# Patient Record
Sex: FEMALE | Race: BLACK OR AFRICAN AMERICAN | NOT HISPANIC OR LATINO | Employment: OTHER | URBAN - METROPOLITAN AREA
[De-identification: names, ages, dates, MRNs, and addresses within clinical notes are randomized per-mention and may not be internally consistent; named-entity substitution may affect disease eponyms.]

---

## 2017-03-30 ENCOUNTER — HOSPITAL ENCOUNTER (INPATIENT)
Facility: HOSPITAL | Age: 77
LOS: 2 days | Discharge: HOME HEALTH | DRG: 394 | End: 2017-04-01
Attending: EMERGENCY MEDICINE | Admitting: INTERNAL MEDICINE
Payer: COMMERCIAL

## 2017-03-30 DIAGNOSIS — D72.829 LEUKOCYTOSIS, UNSPECIFIED TYPE: ICD-10-CM

## 2017-03-30 DIAGNOSIS — R07.89 CHEST WALL PAIN: ICD-10-CM

## 2017-03-30 DIAGNOSIS — K92.1 HEMATOCHEZIA: ICD-10-CM

## 2017-03-30 DIAGNOSIS — K55.9 ISCHEMIC COLITIS: ICD-10-CM

## 2017-03-30 DIAGNOSIS — K92.2 GI BLEED: ICD-10-CM

## 2017-03-30 DIAGNOSIS — E44.0 MALNUTRITION OF MODERATE DEGREE: ICD-10-CM

## 2017-03-30 DIAGNOSIS — R42 DIZZINESS: ICD-10-CM

## 2017-03-30 DIAGNOSIS — R53.81 DEBILITY: ICD-10-CM

## 2017-03-30 DIAGNOSIS — I10 ESSENTIAL HYPERTENSION: Chronic | ICD-10-CM

## 2017-03-30 DIAGNOSIS — K52.9 COLITIS: ICD-10-CM

## 2017-03-30 DIAGNOSIS — K92.2 GASTROINTESTINAL HEMORRHAGE, UNSPECIFIED GASTROINTESTINAL HEMORRHAGE TYPE: Primary | ICD-10-CM

## 2017-03-30 LAB
ABO + RH BLD: NORMAL
ALBUMIN SERPL BCP-MCNC: 2.9 G/DL
ALP SERPL-CCNC: 54 U/L
ALT SERPL W/O P-5'-P-CCNC: 10 U/L
ANION GAP SERPL CALC-SCNC: 12 MMOL/L
APTT BLDCRRT: 27.2 SEC
AST SERPL-CCNC: 14 U/L
BASOPHILS # BLD AUTO: 0.03 K/UL
BASOPHILS # BLD AUTO: 0.03 K/UL
BASOPHILS # BLD AUTO: 0.04 K/UL
BASOPHILS NFR BLD: 0.1 %
BASOPHILS NFR BLD: 0.2 %
BASOPHILS NFR BLD: 0.2 %
BILIRUB SERPL-MCNC: 0.3 MG/DL
BILIRUB UR QL STRIP: NEGATIVE
BLD GP AB SCN CELLS X3 SERPL QL: NORMAL
BLD PROD TYP BPU: NORMAL
BLD PROD TYP BPU: NORMAL
BLOOD UNIT EXPIRATION DATE: NORMAL
BLOOD UNIT EXPIRATION DATE: NORMAL
BLOOD UNIT TYPE CODE: 7300
BLOOD UNIT TYPE CODE: 7300
BLOOD UNIT TYPE: NORMAL
BLOOD UNIT TYPE: NORMAL
BUN SERPL-MCNC: 16 MG/DL
CALCIUM SERPL-MCNC: 9.4 MG/DL
CHLORIDE SERPL-SCNC: 98 MMOL/L
CLARITY UR: CLEAR
CO2 SERPL-SCNC: 23 MMOL/L
CODING SYSTEM: NORMAL
CODING SYSTEM: NORMAL
COLOR UR: YELLOW
CREAT SERPL-MCNC: 1.1 MG/DL
DIFFERENTIAL METHOD: ABNORMAL
DISPENSE STATUS: NORMAL
DISPENSE STATUS: NORMAL
EOSINOPHIL # BLD AUTO: 0.1 K/UL
EOSINOPHIL NFR BLD: 0.3 %
EOSINOPHIL NFR BLD: 0.6 %
EOSINOPHIL NFR BLD: 0.7 %
ERYTHROCYTE [DISTWIDTH] IN BLOOD BY AUTOMATED COUNT: 14.4 %
ERYTHROCYTE [DISTWIDTH] IN BLOOD BY AUTOMATED COUNT: 15.1 %
ERYTHROCYTE [DISTWIDTH] IN BLOOD BY AUTOMATED COUNT: 15.3 %
EST. GFR  (AFRICAN AMERICAN): 57 ML/MIN/1.73 M^2
EST. GFR  (NON AFRICAN AMERICAN): 49 ML/MIN/1.73 M^2
GLUCOSE SERPL-MCNC: 258 MG/DL
GLUCOSE UR QL STRIP: ABNORMAL
HCT VFR BLD AUTO: 25 %
HCT VFR BLD AUTO: 29 %
HCT VFR BLD AUTO: 29.1 %
HGB BLD-MCNC: 8.7 G/DL
HGB BLD-MCNC: 9.4 G/DL
HGB BLD-MCNC: 9.7 G/DL
HGB UR QL STRIP: NEGATIVE
INR PPP: 1
KETONES UR QL STRIP: NEGATIVE
LACTATE SERPL-SCNC: 2.2 MMOL/L
LEUKOCYTE ESTERASE UR QL STRIP: NEGATIVE
LYMPHOCYTES # BLD AUTO: 2.3 K/UL
LYMPHOCYTES # BLD AUTO: 2.6 K/UL
LYMPHOCYTES # BLD AUTO: 2.7 K/UL
LYMPHOCYTES NFR BLD: 10.7 %
LYMPHOCYTES NFR BLD: 13.6 %
LYMPHOCYTES NFR BLD: 13.7 %
MCH RBC QN AUTO: 27.2 PG
MCH RBC QN AUTO: 27.3 PG
MCH RBC QN AUTO: 28 PG
MCHC RBC AUTO-ENTMCNC: 32.4 %
MCHC RBC AUTO-ENTMCNC: 33.3 %
MCHC RBC AUTO-ENTMCNC: 34.8 %
MCV RBC AUTO: 80 FL
MCV RBC AUTO: 82 FL
MCV RBC AUTO: 84 FL
MONOCYTES # BLD AUTO: 1.2 K/UL
MONOCYTES # BLD AUTO: 1.3 K/UL
MONOCYTES # BLD AUTO: 1.5 K/UL
MONOCYTES NFR BLD: 5.9 %
MONOCYTES NFR BLD: 6.4 %
MONOCYTES NFR BLD: 7.4 %
NEUTROPHILS # BLD AUTO: 14.7 K/UL
NEUTROPHILS # BLD AUTO: 15.2 K/UL
NEUTROPHILS # BLD AUTO: 17.8 K/UL
NEUTROPHILS NFR BLD: 78.1 %
NEUTROPHILS NFR BLD: 78.2 %
NEUTROPHILS NFR BLD: 82.4 %
NITRITE UR QL STRIP: NEGATIVE
PH UR STRIP: 5 [PH] (ref 5–8)
PLATELET # BLD AUTO: 202 K/UL
PLATELET # BLD AUTO: 234 K/UL
PLATELET # BLD AUTO: 339 K/UL
PMV BLD AUTO: 8.6 FL
PMV BLD AUTO: 8.9 FL
PMV BLD AUTO: 9.2 FL
POCT GLUCOSE: 146 MG/DL (ref 70–110)
POCT GLUCOSE: 163 MG/DL (ref 70–110)
POCT GLUCOSE: 245 MG/DL (ref 70–110)
POCT GLUCOSE: 262 MG/DL (ref 70–110)
POTASSIUM SERPL-SCNC: 4.4 MMOL/L
PROT SERPL-MCNC: 7 G/DL
PROT UR QL STRIP: NEGATIVE
PROTHROMBIN TIME: 11 SEC
RBC # BLD AUTO: 3.11 M/UL
RBC # BLD AUTO: 3.44 M/UL
RBC # BLD AUTO: 3.56 M/UL
SODIUM SERPL-SCNC: 133 MMOL/L
SP GR UR STRIP: >1.03 (ref 1–1.03)
TRANS ERYTHROCYTES VOL PATIENT: NORMAL ML
TRANS ERYTHROCYTES VOL PATIENT: NORMAL ML
URN SPEC COLLECT METH UR: ABNORMAL
UROBILINOGEN UR STRIP-ACNC: NEGATIVE EU/DL
WBC # BLD AUTO: 18.8 K/UL
WBC # BLD AUTO: 19.51 K/UL
WBC # BLD AUTO: 21.63 K/UL

## 2017-03-30 PROCEDURE — 36430 TRANSFUSION BLD/BLD COMPNT: CPT

## 2017-03-30 PROCEDURE — 85025 COMPLETE CBC W/AUTO DIFF WBC: CPT | Mod: 91

## 2017-03-30 PROCEDURE — 83605 ASSAY OF LACTIC ACID: CPT

## 2017-03-30 PROCEDURE — P9021 RED BLOOD CELLS UNIT: HCPCS

## 2017-03-30 PROCEDURE — 25000003 PHARM REV CODE 250: Performed by: EMERGENCY MEDICINE

## 2017-03-30 PROCEDURE — 85610 PROTHROMBIN TIME: CPT

## 2017-03-30 PROCEDURE — 25500020 PHARM REV CODE 255: Performed by: EMERGENCY MEDICINE

## 2017-03-30 PROCEDURE — 86850 RBC ANTIBODY SCREEN: CPT

## 2017-03-30 PROCEDURE — C9113 INJ PANTOPRAZOLE SODIUM, VIA: HCPCS | Performed by: EMERGENCY MEDICINE

## 2017-03-30 PROCEDURE — 86900 BLOOD TYPING SEROLOGIC ABO: CPT

## 2017-03-30 PROCEDURE — 36415 COLL VENOUS BLD VENIPUNCTURE: CPT

## 2017-03-30 PROCEDURE — 81003 URINALYSIS AUTO W/O SCOPE: CPT

## 2017-03-30 PROCEDURE — 94761 N-INVAS EAR/PLS OXIMETRY MLT: CPT

## 2017-03-30 PROCEDURE — 63600175 PHARM REV CODE 636 W HCPCS: Performed by: EMERGENCY MEDICINE

## 2017-03-30 PROCEDURE — 51702 INSERT TEMP BLADDER CATH: CPT

## 2017-03-30 PROCEDURE — 85730 THROMBOPLASTIN TIME PARTIAL: CPT

## 2017-03-30 PROCEDURE — 99291 CRITICAL CARE FIRST HOUR: CPT | Mod: 25

## 2017-03-30 PROCEDURE — 63600175 PHARM REV CODE 636 W HCPCS: Performed by: INTERNAL MEDICINE

## 2017-03-30 PROCEDURE — 82962 GLUCOSE BLOOD TEST: CPT

## 2017-03-30 PROCEDURE — 96361 HYDRATE IV INFUSION ADD-ON: CPT

## 2017-03-30 PROCEDURE — 20000000 HC ICU ROOM

## 2017-03-30 PROCEDURE — 96375 TX/PRO/DX INJ NEW DRUG ADDON: CPT

## 2017-03-30 PROCEDURE — 86920 COMPATIBILITY TEST SPIN: CPT

## 2017-03-30 PROCEDURE — 96365 THER/PROPH/DIAG IV INF INIT: CPT

## 2017-03-30 PROCEDURE — 25000003 PHARM REV CODE 250: Performed by: INTERNAL MEDICINE

## 2017-03-30 PROCEDURE — 80053 COMPREHEN METABOLIC PANEL: CPT

## 2017-03-30 RX ORDER — HYDROCODONE BITARTRATE AND ACETAMINOPHEN 500; 5 MG/1; MG/1
TABLET ORAL
Status: DISCONTINUED | OUTPATIENT
Start: 2017-03-30 | End: 2017-04-01 | Stop reason: HOSPADM

## 2017-03-30 RX ORDER — ONDANSETRON 2 MG/ML
8 INJECTION INTRAMUSCULAR; INTRAVENOUS EVERY 8 HOURS PRN
Status: DISCONTINUED | OUTPATIENT
Start: 2017-03-30 | End: 2017-03-30 | Stop reason: SINTOL

## 2017-03-30 RX ORDER — VALSARTAN 320 MG/1
320 TABLET ORAL DAILY
Status: ON HOLD | COMMUNITY
End: 2017-04-01

## 2017-03-30 RX ORDER — RAMELTEON 8 MG/1
8 TABLET ORAL NIGHTLY PRN
Status: DISCONTINUED | OUTPATIENT
Start: 2017-03-30 | End: 2017-04-01 | Stop reason: HOSPADM

## 2017-03-30 RX ORDER — GLUCAGON 1 MG
1 KIT INJECTION
Status: DISCONTINUED | OUTPATIENT
Start: 2017-03-30 | End: 2017-04-01 | Stop reason: HOSPADM

## 2017-03-30 RX ORDER — POLYETHYLENE GLYCOL 3350, SODIUM SULFATE ANHYDROUS, SODIUM BICARBONATE, SODIUM CHLORIDE, POTASSIUM CHLORIDE 236; 22.74; 6.74; 5.86; 2.97 G/4L; G/4L; G/4L; G/4L; G/4L
2000 POWDER, FOR SOLUTION ORAL ONCE
Status: COMPLETED | OUTPATIENT
Start: 2017-03-31 | End: 2017-03-30

## 2017-03-30 RX ORDER — METRONIDAZOLE 500 MG/100ML
500 INJECTION, SOLUTION INTRAVENOUS
Status: DISCONTINUED | OUTPATIENT
Start: 2017-03-30 | End: 2017-03-31

## 2017-03-30 RX ORDER — HYDROCHLOROTHIAZIDE 25 MG/1
25 TABLET ORAL DAILY
Status: ON HOLD | COMMUNITY
End: 2017-04-01 | Stop reason: HOSPADM

## 2017-03-30 RX ORDER — PANTOPRAZOLE SODIUM 40 MG/10ML
80 INJECTION, POWDER, LYOPHILIZED, FOR SOLUTION INTRAVENOUS ONCE
Status: COMPLETED | OUTPATIENT
Start: 2017-03-30 | End: 2017-03-30

## 2017-03-30 RX ORDER — INSULIN ASPART 100 [IU]/ML
0-5 INJECTION, SOLUTION INTRAVENOUS; SUBCUTANEOUS EVERY 6 HOURS PRN
Status: DISCONTINUED | OUTPATIENT
Start: 2017-03-30 | End: 2017-04-01 | Stop reason: HOSPADM

## 2017-03-30 RX ORDER — POLYETHYLENE GLYCOL 3350, SODIUM SULFATE ANHYDROUS, SODIUM BICARBONATE, SODIUM CHLORIDE, POTASSIUM CHLORIDE 236; 22.74; 6.74; 5.86; 2.97 G/4L; G/4L; G/4L; G/4L; G/4L
2000 POWDER, FOR SOLUTION ORAL ONCE
Status: COMPLETED | OUTPATIENT
Start: 2017-03-30 | End: 2017-03-30

## 2017-03-30 RX ORDER — ACETAMINOPHEN 500 MG
500 TABLET ORAL EVERY 6 HOURS PRN
Status: DISCONTINUED | OUTPATIENT
Start: 2017-03-30 | End: 2017-04-01 | Stop reason: HOSPADM

## 2017-03-30 RX ORDER — CIPROFLOXACIN 2 MG/ML
400 INJECTION, SOLUTION INTRAVENOUS
Status: DISCONTINUED | OUTPATIENT
Start: 2017-03-30 | End: 2017-03-31

## 2017-03-30 RX ADMIN — INSULIN ASPART 2 UNITS: 100 INJECTION, SOLUTION INTRAVENOUS; SUBCUTANEOUS at 11:03

## 2017-03-30 RX ADMIN — SODIUM CHLORIDE 1000 ML: 0.9 INJECTION, SOLUTION INTRAVENOUS at 12:03

## 2017-03-30 RX ADMIN — IOHEXOL 15 ML: 300 INJECTION, SOLUTION INTRAVENOUS at 02:03

## 2017-03-30 RX ADMIN — INSULIN DETEMIR 10 UNITS: 100 INJECTION, SOLUTION SUBCUTANEOUS at 09:03

## 2017-03-30 RX ADMIN — METRONIDAZOLE 500 MG: 500 INJECTION, SOLUTION INTRAVENOUS at 07:03

## 2017-03-30 RX ADMIN — POLYETHYLENE GLYCOL 3350, SODIUM SULFATE ANHYDROUS, SODIUM BICARBONATE, SODIUM CHLORIDE, POTASSIUM CHLORIDE 2000 ML: 236; 22.74; 6.74; 5.86; 2.97 POWDER, FOR SOLUTION ORAL at 08:03

## 2017-03-30 RX ADMIN — CIPROFLOXACIN 400 MG: 2 INJECTION, SOLUTION INTRAVENOUS at 07:03

## 2017-03-30 RX ADMIN — CIPROFLOXACIN 400 MG: 2 INJECTION, SOLUTION INTRAVENOUS at 04:03

## 2017-03-30 RX ADMIN — METRONIDAZOLE 500 MG: 500 INJECTION, SOLUTION INTRAVENOUS at 01:03

## 2017-03-30 RX ADMIN — DEXTROSE 8 MG/HR: 50 INJECTION, SOLUTION INTRAVENOUS at 08:03

## 2017-03-30 RX ADMIN — DEXTROSE 8 MG/HR: 50 INJECTION, SOLUTION INTRAVENOUS at 02:03

## 2017-03-30 RX ADMIN — PANTOPRAZOLE SODIUM 80 MG: 40 INJECTION, POWDER, FOR SOLUTION INTRAVENOUS at 04:03

## 2017-03-30 RX ADMIN — IOHEXOL 80 ML: 350 INJECTION, SOLUTION INTRAVENOUS at 02:03

## 2017-03-30 RX ADMIN — DEXTROSE 8 MG/HR: 50 INJECTION, SOLUTION INTRAVENOUS at 04:03

## 2017-03-30 RX ADMIN — SODIUM CHLORIDE 1000 ML: 0.9 INJECTION, SOLUTION INTRAVENOUS at 03:03

## 2017-03-30 RX ADMIN — METRONIDAZOLE 500 MG: 500 INJECTION, SOLUTION INTRAVENOUS at 10:03

## 2017-03-30 RX ADMIN — POLYETHYLENE GLYCOL 3350, SODIUM SULFATE ANHYDROUS, SODIUM BICARBONATE, SODIUM CHLORIDE, POTASSIUM CHLORIDE 2000 ML: 236; 22.74; 6.74; 5.86; 2.97 POWDER, FOR SOLUTION ORAL at 07:03

## 2017-03-30 RX ADMIN — DEXTROSE 8 MG/HR: 50 INJECTION, SOLUTION INTRAVENOUS at 07:03

## 2017-03-30 NOTE — ED PROVIDER NOTES
"Encounter Date: 3/30/2017    SCRIBE #1 NOTE: I, Jagdish Estrella, am scribing for, and in the presence of,  Bhakti Augustine MD. I have scribed the following portions of the note - Other sections scribed: ROS, HPI.       History     Chief Complaint   Patient presents with    Rectal Bleeding     Pt states "when I move my bowels im bleeding and its coming out in clots from my rectum." pts son states blood is everywhere in the house and in the car     Review of patient's allergies indicates:  No Known Allergies  HPI Comments: CC: Rectal bleeding    HPI: This 76 y.o. F, who has a past medical history of Diabetes mellitus and Hypertension, presents to the ED for evaluation of rectal bleeding and tenesmus x7 hours. The patient felt the need for a bowel movement but only produced dark red blood with clots. No prior episodes of similar bleeding. She notes some associated chills. She denies abdominal pain, nausea, vomiting, diarrhea, frequency, fever and headache. She notes she had a pessary device placed a month ago. She has an appointment with her OB, Dr. Marlo Singh, tomorrow. She notes she had a colonoscopy three years ago and it was normal. She also takes an Advil every night before bed because it helps her sleep.  Symptoms are acute.  No modifying factors.    The history is provided by the patient.     Past Medical History:   Diagnosis Date    Diabetes mellitus     Hypertension      Past Surgical History:   Procedure Laterality Date    OVARY SURGERY       No family history on file.  Social History   Substance Use Topics    Smoking status: Never Smoker    Smokeless tobacco: Not on file    Alcohol use No     Review of Systems   Constitutional: Positive for chills. Negative for fever.   HENT: Negative for sore throat.    Eyes: Negative for visual disturbance.   Respiratory: Negative for shortness of breath.    Cardiovascular: Negative for chest pain.   Gastrointestinal: Positive for anal bleeding. " Negative for abdominal pain, diarrhea, nausea and vomiting.   Genitourinary: Negative for dysuria.   Musculoskeletal: Negative for back pain.   Skin: Negative for rash.   Neurological: Negative for headaches.       Physical Exam   Initial Vitals   BP Pulse Resp Temp SpO2   03/30/17 0021 03/30/17 0021 03/30/17 0021 03/30/17 0021 03/30/17 0021   134/62 109 18 98.9 °F (37.2 °C) 95 %     Physical Exam    Nursing note and vitals reviewed.  Constitutional: Vital signs are normal. She appears well-developed and well-nourished.   Elderly female in no acute distress.  Cooperative for exam.   HENT:   Head: Normocephalic and atraumatic.   Nose: Nose normal.   Mouth/Throat: Oropharynx is clear and moist.   Eyes: EOM are normal.   Neck: Neck supple.   Cardiovascular: Normal rate and regular rhythm. Exam reveals no gallop and no friction rub.    No murmur heard.  Pulmonary/Chest: Breath sounds normal. She has no wheezes. She has no rhonchi. She has no rales.   Abdominal: Soft. Bowel sounds are normal. She exhibits no distension and no pulsatile midline mass. There is no tenderness.   Genitourinary:   Genitourinary Comments: Right red blood per rectum.  Small external hemorrhoid present.  Nonthrombosed.  No large internal hemorrhoids identified.   Musculoskeletal: Normal range of motion.   Neurological: She is alert.   Skin: Skin is warm and dry. No rash noted.   Psychiatric: She has a normal mood and affect.         ED Course   Critical Care  Date/Time: 3/30/2017 3:47 AM  Performed by: PIPER OLIVERA  Authorized by: PIPER OLIVERA   Direct patient critical care time: 25 minutes  Additional history critical care time: 5 (Son and daughter) minutes  Ordering / reviewing critical care time: 5 minutes  Documentation critical care time: 10 minutes  Consulting other physicians critical care time: 10 minutes  Total critical care time (exclusive of procedural time) : 55 minutes        Labs Reviewed   COMPREHENSIVE  METABOLIC PANEL - Abnormal; Notable for the following:        Result Value    Sodium 133 (*)     Glucose 258 (*)     Albumin 2.9 (*)     Alkaline Phosphatase 54 (*)     eGFR if  57 (*)     eGFR if non  49 (*)     All other components within normal limits   CBC W/ AUTO DIFFERENTIAL - Abnormal; Notable for the following:     WBC 18.80 (*)     RBC 3.44 (*)     Hemoglobin 9.4 (*)     Hematocrit 29.0 (*)     Gran # 14.7 (*)     Mono # 1.2 (*)     Gran% 78.2 (*)     Lymph% 13.6 (*)     All other components within normal limits   PROTIME-INR   APTT   URINALYSIS   LACTIC ACID, PLASMA   TYPE & SCREEN   PREPARE RBC SOFT   PREPARE RBC SOFT     EKG Readings: (Independently Interpreted)   Sinus tachycardia, rate approximately 101.  No ST elevation or depression.  QTc 435.  No evidence of acute ischemia or malignant arrhythmia.          Medical Decision Making:   History:   Old Medical Records: I decided to obtain old medical records.  Differential Diagnosis:   Colitis, diverticulitis, hemorrhoids,   76 y.o. female presents to the emergency department with right breath blood per rectum that began tonight.  According to patient's son, patient had significant blood loss from rectum and blood clots all over the home prior to coming to ED.  Patient states she is not on blood thinners.  She does report regular NSAID use, but has never had GI bleeding like this before.  Patient denies chest pain, shortness of breath, denies abdominal pain.  She reports bloody diarrhea earlier today.  Denies fever.  On exam patient has bright red blood per rectum.  She has a small external hemorrhoid present.  No thrombosed hemorrhoid or internal hemorrhoids identified on exam.  Abdomen is soft and non-tender. Due to the patient's bloody diarrhea, CT abdomen and pelvis ordered.  I reviewed CT results with radiologist, Dr. Eddy.  He states the patient has wall thickening extending from the transverse colon to distal  descending colon, suggestive of ischemic colitis in MIRELLA distribution.  He however states the MIRELLA is patent.  He reports inflammatory versus infectious colitis is still on differential as well.  Patient has leukocytosis with white blood cell count 18.8.  Hemoglobin 9.4, hematocrit 29.  Labs from July 2016 shows significant drop in H/H today.  Platelets are within normal limits.  Coags are normal.  Creatinine is within normal limits.  Glucose 258. Anion gap is 12. Patient is diabetic, on metformin.  Doubt DKA at this time. Type and screen ordered.  Lactic acid added on to labs.  I will cover for infectious cause of colitis with Cipro and Flagyl IV.  Patient has no abdominal tenderness on my exam.  While attempting to provide urinalysis, patient had explosive bloody bowel movement in her ED room.  She became hypotensive.  I decided to consent the patient for blood transfusion.  Another IV fluid bolus ordered with improvement of her blood pressure.  Case discussed with hospitalist, Dr. Aburto.  I believe the patient would benefit from ICU admission, blood transfusion, GI consult. Gi consulted and I spoke to Dr. Priest.  He recommends stat nuclear medicine tagged red blood cell scan to try to better identify the source of her GI bleeding.  He recommends transfusing the patient 2 units of blood and admitting to the ICU.  Patient informed of admission and agrees.  I will hold metformin, and order sliding scale insulin.  Patient is not currently on any blood thinners.  I will not order prophylactic Lovenox.                   ED Course     Clinical Impression:   The primary encounter diagnosis was Gastrointestinal hemorrhage, unspecified gastrointestinal hemorrhage type. Diagnoses of GI bleed and Colitis were also pertinent to this visit.          Bhakti Augustine MD  03/30/17 0425       Bhakti Augustine MD  03/30/17 0428

## 2017-03-30 NOTE — PLAN OF CARE
Problem: Patient Care Overview  Goal: Plan of Care Review  Outcome: Ongoing (interventions implemented as appropriate)  Negative bleeding scan.  Passed two dark red BM's this shift. Received two units PRBC's.  On Protonix drip. VSS.  Colonoscopy in am.  Pt explained procedure, verbalized understanding.  Prep to begin this evening.  Pressure ulcer & fall prevention interventions on-going.

## 2017-03-30 NOTE — NURSING
PER DR. AYALA, PT TO STAY IN ED UNTIL NM SCAN IS DONE. MADE AWARE THIS IS AN APPROX 1-2HR WAIT DUE TO NATURE OF THE TEST. VERBALIZES UNDERSTANDING. HOUSE SUPERVISOR MADE AWARE.

## 2017-03-30 NOTE — H&P
[facility] Hospital Medicine  History & Physical    Patient Name: Radha Morales  MRN: [id number]  Admission Date: 3/30/2017  Attending Physician: Bhakti Augustine, *   Primary Care Provider: Diego Medel Jr, MD         Patient information was obtained from patient.     Subjective:     Principal Problem:Hematochezia    Chief Complaint: Rectal bleeding tonight.    HPI: Ms. Radha Morales is a 76 y.o. female known to me with essential hypertension and type 2 diabetes mellitus (HbA1c 6.4% Jun 2016) who presents to [facility] ED with complaints of hematochezia tonight. She reports that she had 3-4 episodes of bloody stools before she decided to present to the ED.  She had some weakness and lightheadedness but denies any shortness of breath, chest pain, palpitations, falls, nor any loss of consciousness.  She denies any other blood loss specifically denying any hematuria, vaginal bleeding, hemoptysis, hematemesis, nor any coffee-ground emesis.  She denies any nausea, vomiting, fevers, chills, nor any abdominal pain, and has never had similar symptoms in the past.  She does take Advil PM every night to help sleep.    Chart Review:  Previous Hospitalizations  Date Hospital Diagnosis   Jun 2016 [facility] Dizziness due to polypharmacy      Past Medical History:   Diagnosis Date    Diabetes mellitus     Hypertension        Past Surgical History:   Procedure Laterality Date    OVARY SURGERY         Review of patient's allergies indicates:  No Known Allergies    No current facility-administered medications on file prior to encounter.      Current Outpatient Prescriptions on File Prior to Encounter   Medication Sig    ergocalciferol (ERGOCALCIFEROL) 50,000 unit Cap Take 1 capsule (50,000 Units total) by mouth every 7 days.    metformin (GLUCOPHAGE) 1000 MG tablet Take 1,000 mg by mouth 2 (two) times daily with meals.     Family History     None        Social History Main Topics    Smoking status:  Never Smoker    Smokeless tobacco: Not on file    Alcohol use No    Drug use: No    Sexual activity: Not on file     Review of Systems   Constitutional: Positive for fatigue. Negative for activity change, appetite change, chills, diaphoresis, fever and unexpected weight change.   HENT: Negative.    Eyes: Negative.    Respiratory: Negative for cough, chest tightness, shortness of breath and wheezing.    Cardiovascular: Negative for chest pain, palpitations and leg swelling.   Gastrointestinal: Positive for blood in stool. Negative for abdominal distention, abdominal pain, constipation, diarrhea, nausea and vomiting.   Endocrine: Negative.    Genitourinary: Negative for dysuria and hematuria.   Musculoskeletal: Negative.    Neurological: Positive for dizziness, weakness and light-headedness. Negative for seizures and syncope.   Psychiatric/Behavioral: Negative.      Objective:     Vital Signs (Most Recent):  Temp: 98.9 °F (37.2 °C) (03/30/17 0021)  Pulse: 90 (03/30/17 0445)  Resp: 18 (03/30/17 0021)  BP: (!) 114/53 (03/30/17 0445)  SpO2: 100 % (03/30/17 0445) Vital Signs (24h Range):  Temp:  [98.9 °F (37.2 °C)] 98.9 °F (37.2 °C)  Pulse:  [] 90  Resp:  [18] 18  SpO2:  [95 %-100 %] 100 %  BP: ()/(52-62) 114/53     Weight: 69.4 kg (153 lb)  Body mass index is 25.46 kg/(m^2).    Physical Exam   Constitutional: She is oriented to person, place, and time. She appears well-developed and well-nourished. No distress.   HENT:   Head: Normocephalic and atraumatic.   Right Ear: External ear normal.   Left Ear: External ear normal.   Nose: Nose normal.   Eyes: Right eye exhibits no discharge. Left eye exhibits no discharge.   Neck: Normal range of motion.   Cardiovascular:   Tachycardic without murmurs, borderline blood pressure   Pulmonary/Chest: Effort normal and breath sounds normal. No respiratory distress. She has no wheezes. She has no rales.   Abdominal: Soft. Bowel sounds are normal. She exhibits no  "distension. There is no tenderness. There is no rebound and no guarding.   Musculoskeletal: Normal range of motion. She exhibits no edema.   Neurological: She is alert and oriented to person, place, and time.   Skin: Skin is warm and dry. She is not diaphoretic. No erythema.   Psychiatric: She has a normal mood and affect. Her behavior is normal. Judgment and thought content normal.   Nursing note and vitals reviewed.       Significant Labs: All pertinent labs within the past 24 hours have been reviewed.    Significant Imaging: I have reviewed and interpreted all pertinent imaging results/findings within the past 24 hours.    Assessment/Plan:     * Hematochezia  Patient's hematochezia would appear to be diverticular in etiology were it not for the CT-abd/pelvis findings of "[m]arked wall thickening extending from the mid aspect of the transverse colon to the level of the distal descending colon.  The findings are suggestive of ischemic colitis in the MIRELLA distribution.  Calcific plaque at the origin of the MIRELLA with patency of the MIRELLA.  Other differential consideration would include inflammatory/infectious colitis."  Gastroenterology has been consulted from the ED and had recommended transfusion for pRBC 2 units and a STAT nuclear bleeding scan; empiric antibiotics were also recommended.  Will await further recommendations and transfuse as necessary.    Ischemic colitis  As addressed above.    Essential hypertension  She is currently not on antihypertensive medications at this time.      Type 2 diabetes mellitus, controlled  Well-controlled on a home regimen of metformin; will provide basal insulin along with insulin sliding scale.    VTE Risk Mitigation     None            Critical Care Time: 60 minutes.        Rosenda Aburto M.D.  Staff Nocturnist  Department of Hospital Medicine  [facility]  Pager: [phone]    "

## 2017-03-30 NOTE — PLAN OF CARE
[facility] [employer] [address] [place], [place] - [address] AT [facility]  [address]  [facility] [address]  Phone: [phone] Fax: [phone]       03/30/17 1232   Discharge Assessment   Assessment Type Discharge Planning Assessment   Confirmed/corrected address and phone number on facesheet? Yes   Assessment information obtained from? Patient   Prior to hospitilization cognitive status: Alert/Oriented   Prior to hospitalization functional status: Independent;Assistive Equipment   Current cognitive status: Alert/Oriented   Current Functional Status: Independent;Assistive Equipment   Arrived From home or self-care   Lives With alone   Able to Return to Prior Arrangements yes   Is patient able to care for self after discharge? Yes   Who are your caregiver(s) and their phone number(s)? Family Assist   Patient's perception of discharge disposition home or selfcare   Equipment Currently Used at Home walker, rolling   Do you have any problems affording any of your prescribed medications? No   Is the patient taking medications as prescribed? yes   Do you have any financial concerns preventing you from receiving the healthcare you need? No   Does the patient have transportation to healthcare appointments? Yes   Transportation Available family or friend will provide   On Dialysis? No   Discharge Plan A Home with family   Discharge Plan B (Prefer Morning Appointments)   Patient/Family In Agreement With Plan yes

## 2017-03-30 NOTE — CONSULTS
Reason for Consult:  Hematochezia    HPI:  Pt is a 76 y.o. female who presents with complaints of bloody BM's for 1 day.  Sx's began acutely yesterday, were severe, beginning yesterday.  Described as dark/red blood.  No obvious precipitating factor.  No alleviating/exacerbating factor.  No prior Hx of similar symptoms.  No associated abd. Pain, N/V, F/C.  + associated weakness.  No dysphagia, GERD sx's, yellowing of eyes/skin.  No diarrhea/constipation.  No black/tarry stools.      Pt. Had C-scope in 2012    Past Medical History:   Diagnosis Date    Diabetes mellitus     Hypertension        Past Surgical History:   Procedure Laterality Date    OVARY SURGERY         No family history on file.    Social History     Social History    Marital status:      Spouse name: N/A    Number of children: N/A    Years of education: N/A     Occupational History    Not on file.     Social History Main Topics    Smoking status: Never Smoker    Smokeless tobacco: Not on file    Alcohol use No    Drug use: No    Sexual activity: Not on file     Other Topics Concern    Not on file     Social History Narrative       Endoscopic History:  C-scope - 05/2012 - Moderate diverticulosis    Review of patient's allergies indicates:  No Known Allergies    No current facility-administered medications on file prior to encounter.      Current Outpatient Prescriptions on File Prior to Encounter   Medication Sig Dispense Refill    ergocalciferol (ERGOCALCIFEROL) 50,000 unit Cap Take 1 capsule (50,000 Units total) by mouth every 7 days. 12 capsule 0    metformin (GLUCOPHAGE) 1000 MG tablet Take 1,000 mg by mouth 2 (two) times daily with meals.       Scheduled Meds:   ciprofloxacin (CIPRO)400mg/200ml D5W IVPB  400 mg Intravenous Q12H    insulin detemir  10 Units Subcutaneous QHS    metronidazole  500 mg Intravenous Q8H     Continuous Infusions:   pantoprozole 40 mg in dextrose 5 % 100 mL infusion (ready to mix system) 8 mg/hr  (03/30/17 1200)     PRN Meds:.sodium chloride, acetaminophen, dextrose 50%, glucagon (human recombinant), insulin aspart, promethazine (PHENERGAN) IVPB, ramelteon    Review of Systems:  CONSTITUTIONAL: + for weakness, no fever, weight loss, weight gain.  HEENT: Eyes: Negative for double/blurred vision. Ears: Negative pain or loss of hearing. Nose:Negative for nasal congestion. Negative for rhinorrhea Mouth: Negative for dry mouth/pain Throat: Negative for masses or hoarseness.  CARDIOVASCULAR: Negative for chest pain or palpitations.  RESPIRATORY: Negative for SOB, wheezes  GASTROINTESTINAL: See HPI  GENITOURINARY: Negative for dysuria/hematuria.  MUSCULOSKELETAL: Negative for osteoarthritis, muscle pain.  SKIN: Negative for rashes/lesions.  NEUROLOGIC: Negative for headaches, numbness/tingling.  ENDOCRINE: + for diabetes, no thyroid abnormalities.  HEMATOLOGIC: Negative for anemia or blood dyscrasias.    Patient Vitals for the past 24 hrs:   BP Temp Temp src Pulse Resp SpO2 Height Weight   03/30/17 1204 - - - 77 (!) 21 97 % - -   03/30/17 1202 (!) 113/55 - - - - - - -   03/30/17 1200 - - - 79 17 97 % - -   03/30/17 1139 - - - 85 (!) 64 96 % - -   03/30/17 1132 (!) 106/58 - - - - - - -   03/30/17 1130 - - - 77 18 96 % - -   03/30/17 1115 - - - 77 14 98 % - -   03/30/17 1104 (!) 100/54 - - - - - - -   03/30/17 1100 - - - 79 20 99 % - -   03/30/17 1045 - - - 78 18 100 % - -   03/30/17 1032 113/61 - - - - - - -   03/30/17 1030 113/61 98 °F (36.7 °C) Oral 82 17 100 % - -   03/30/17 1024 - - - 81 16 99 % - -   03/30/17 1015 - - - 84 (!) 21 100 % - -   03/30/17 1009 (!) 105/58 97.5 °F (36.4 °C) Oral 87 (!) 30 100 % - -   03/30/17 1003 - - - 83 (!) 21 100 % - -   03/30/17 1002 (!) 105/58 - - - - - - -   03/30/17 1000 - - - 85 (!) 22 100 % - -   03/30/17 0955 - - - - (!) 22 - - -   03/30/17 0947 112/64 - - - - - - -   03/30/17 0945 (!) 110/56 - - 86 (!) 59 99 % - -   03/30/17 0915 127/60 - - - - - - -   03/30/17 0900 128/60  "- - - - - - -   03/30/17 0845 130/60 98.7 °F (37.1 °C) Oral 84 18 98 % - -   03/30/17 0830 (!) 104/52 - - 84 18 - - -   03/30/17 0800 (!) 109/53 - - 79 17 98 % - -   03/30/17 0745 - - - 80 18 97 % - -   03/30/17 0730 (!) 105/52 - - 82 17 96 % - -   03/30/17 0720 - - - 84 (!) 27 99 % - -   03/30/17 0715 (!) 113/55 - - 84 20 100 % - -   03/30/17 0657 (!) 133/55 97.6 °F (36.4 °C) Oral 85 15 96 % 5' 5" (1.651 m) 81.6 kg (179 lb 14.3 oz)   03/30/17 0645 (!) 99/59 - - 88 - 97 % - -   03/30/17 0630 - - - - - 95 % - -   03/30/17 0628 99/60 - - 90 - - - -   03/30/17 0605 (!) 91/53 - - 94 - 99 % - -   03/30/17 0551 (!) 127/56 - - 100 - - - -   03/30/17 0547 (!) 127/56 97.6 °F (36.4 °C) Oral 100 18 100 % - -   03/30/17 0530 (!) 103/54 - - 96 - - - -   03/30/17 0445 (!) 114/53 - - 90 - 100 % - -   03/30/17 0430 (!) 99/53 - - 84 - 99 % - -   03/30/17 0415 (!) 108/53 - - 82 - 98 % - -   03/30/17 0400 (!) 93/52 - - 92 - 98 % - -   03/30/17 0319 (!) 94/55 - - 96 - - - -   03/30/17 0215 (!) 114/55 - - 96 - 99 % - -   03/30/17 0021 134/62 98.9 °F (37.2 °C) Oral 109 18 95 % 5' 5" (1.651 m) 69.4 kg (153 lb)       Gen: Well developed, well nourished, no apparent distress, cooperative  HEENT: Anicteric, PERRLA, normocephalic, neck symmetrical  CV: S1, S2, no murmers/rubs, non-displaced PMI  Lungs: CTA-B, normal excursion  Abd: Soft, NT, ND, normal BS's, no HSM  Ext: No c/c/e, 1+ DP pulses to BLE's  Neuro: CN II-XII grossly intact, no asterixis.  Skin: No rashes/lesions, normal texture  Psych: AA&O x 4, normal affect    Labs:    Recent Labs  Lab 03/30/17  0056   CALCIUM 9.4   PROT 7.0   *   K 4.4   CO2 23   CL 98   BUN 16   CREATININE 1.1   ALKPHOS 54*   ALT 10   AST 14   BILITOT 0.3     Recent Results (from the past 336 hour(s))   CBC auto differential    Collection Time: 03/30/17 12:56 AM   Result Value Ref Range    WBC 18.80 (H) 3.90 - 12.70 K/uL    Hemoglobin 9.4 (L) 12.0 - 16.0 g/dL    Hematocrit 29.0 (L) 37.0 - 48.5 %    " Platelets 339 150 - 350 K/uL       Recent Labs  Lab 03/30/17  0056   INR 1.0   APTT 27.2       Imaging:  CT:  Marked wall thickening extending from the mid aspect of the transverse colon to the level of the distal descending colon.  The findings are suggestive of ischemic colitis in the MIRELLA distribution.  Calcific plaque at the origin of the MIRELLA with patency of the MIRELLA.  Other differential consideration would include inflammatory/infectious colitis.  Suggest clinical correlation.    Diverticulosis coli without evidence of acute diverticulitis.    Nodular airspace opacities in the lingula.  CT scan of the chest in 2-3 months is suggested to document resolution.    Tagged RBC scan:  Negative for acute bleeding.    Assessment:  Pt. Is a 76 y.o. female with:  1. Hematochezia - Most consistent with ischemic colitis (though no abd. Pain/tenderness).  Pt. Does have Hx of diverticulosis, last C-scope 2012.  Tagged study negative.  2. Anemia - acute post hemorrhagic, getting transfusions.  3. DM, HTN....    Recommendations:  1. Monitor for sx's.  2. OK for clear liquids, bowel prep this evening.  3. Transfuse per PCP.  4. C-scope tomorrow.        I would like to take this opportunity to thank you for this consult.  If you have any questions or concerns, please do not hesitate to contact me.

## 2017-03-30 NOTE — ASSESSMENT & PLAN NOTE
"Patient's hematochezia would appear to be diverticular in etiology were it not for the CT-abd/pelvis findings of "[m]arked wall thickening extending from the mid aspect of the transverse colon to the level of the distal descending colon.  The findings are suggestive of ischemic colitis in the MIRELLA distribution.  Calcific plaque at the origin of the MIRELLA with patency of the MIRELLA.  Other differential consideration would include inflammatory/infectious colitis."  Gastroenterology has been consulted from the ED and had recommended transfusion for pRBC 2 units and a STAT nuclear bleeding scan; empiric antibiotics were also recommended.  Will await further recommendations and transfuse as necessary.  "

## 2017-03-30 NOTE — IP AVS SNAPSHOT
[place] [address] [place] [address]  Phone: [phone]           Patient Discharge Instructions   Our goal is to set you up for success. This packet includes information on your condition, medications, and your home care.  It will help you care for yourself to prevent having to return to the hospital.     Please ask your nurse if you have any questions.      There are many details to remember when preparing to leave the hospital. Here is what you will need to do:    1. Take your medicine. If you are prescribed medications, review your Medication List on the following pages. You may have new medications to  at the pharmacy and others that you'll need to stop taking. Review the instructions for how and when to take your medications. Talk with your doctor or nurses if you are unsure of what to do.     2. Go to your follow-up appointments. Specific follow-up information is listed in the following pages. Your may be contacted by a nurse or clinical provider about future appointments. Be sure we have all of the phone numbers to reach you. Please contact your provider's office if you are unable to make an appointment.     3. Watch for warning signs. Your doctor or nurse will give you detailed warning signs to watch for and when to call for assistance. These instructions may also include educational information about your condition. If you experience any of warning signs to your health, call your doctor.               ** Verify the list of medication(s) below is accurate and up to date. Carry this with you in case of emergency. If your medications have changed, please notify your healthcare provider.             Medication List      START taking these medications        Additional Info                      ciprofloxacin HCl 500 MG tablet   Commonly known as:  CIPRO   Quantity:  20 tablet   Refills:  0   Dose:  500 mg   Indications:  Intra-abdominal    Last time this was given:   500 mg on 4/1/2017  9:42 AM   Instructions:  Take 1 tablet (500 mg total) by mouth every 12 (twelve) hours.     Begin Date    AM    Noon    PM    Bedtime       metronidazole 500 MG tablet   Commonly known as:  FLAGYL   Quantity:  30 tablet   Refills:  0   Dose:  500 mg   Indications:  Intra-abdominal    Last time this was given:  500 mg on 4/1/2017  2:25 PM   Instructions:  Take 1 tablet (500 mg total) by mouth every 8 (eight) hours.     Begin Date    AM    Noon    PM    Bedtime         CHANGE how you take these medications        Additional Info                      valsartan 320 MG tablet   Commonly known as:  DIOVAN   Quantity:  15 tablet   Refills:  0   Dose:  160 mg   What changed:    - how much to take  - additional instructions    Instructions:  Take 0.5 tablets (160 mg total) by mouth once daily. Hold for BP less than 130/80     Begin Date    AM    Noon    PM    Bedtime         CONTINUE taking these medications        Additional Info                      ergocalciferol 50,000 unit Cap   Commonly known as:  ERGOCALCIFEROL   Quantity:  12 capsule   Refills:  0   Dose:  92271 Units    Instructions:  Take 1 capsule (50,000 Units total) by mouth every 7 days.     Begin Date    AM    Noon    PM    Bedtime       metformin 1000 MG tablet   Commonly known as:  GLUCOPHAGE   Refills:  0   Dose:  1000 mg    Instructions:  Take 1,000 mg by mouth 2 (two) times daily with meals.     Begin Date    AM    Noon    PM    Bedtime         STOP taking these medications     hydrochlorothiazide 25 MG tablet   Commonly known as:  HYDRODIURIL            Where to Get Your Medications      You can get these medications from any pharmacy     Bring a paper prescription for each of these medications     ciprofloxacin HCl 500 MG tablet    metronidazole 500 MG tablet    valsartan 320 MG tablet                  Please bring to all follow up appointments:    1. A copy of your discharge instructions.  2. All medicines you are currently taking  "in their original bottles.  3. Identification and insurance card.    Please arrive 15 minutes ahead of scheduled appointment time.    Please call 24 hours in advance if you must reschedule your appointment and/or time.        Follow-up Information     Follow up with Diego Medel Jr, MD In 1 week.    Specialty:  Family Medicine    Why:  Call to schedule appointment with Dr. Medel on Monday. Once scheduled, bring all discharge paperwork and all medications.     Contact information:    [address]  [facility] [address]  [phone]          Discharge Instructions     Future Orders    Activity as tolerated     Call MD for:  difficulty breathing or increased cough     Call MD for:  persistent dizziness, light-headedness, or visual disturbances     Call MD for:  persistent nausea and vomiting or diarrhea     Call MD for:  severe uncontrolled pain     Call MD for:  temperature >100.4     Diet Diabetic 1800 Calories         Primary Diagnosis     Your primary diagnosis was:  Blood In Stool      Admission Information     Date & Time Provider Department [facility]    3/30/2017 12:27 AM Zee Mcdonough MD [facility] [id number]      Care Providers     Provider Role Specialty Primary office phone    Zee Mcdonough MD Attending Provider Hospitalist [phone]    Apolinar Matute MD Surgeon  Gastroenterology [phone]      Your Vitals Were     BP Pulse Temp Resp Height Weight    132/59 (BP Location: Right arm, Patient Position: Lying, BP Method: Automatic) 86 98.9 °F (37.2 °C) (Oral) 20 5' 5" (1.651 m) 81.6 kg (179 lb 14.3 oz)    SpO2 BMI             99% 29.94 kg/m2         Recent Lab Values        6/30/2016                           4:54 AM           A1C 6.4 (H)                       Pending Labs     Order Current Status    Hemoglobin A1c In process    Specimen to Pathology - Surgery In process    Prepare RBC 2 Units; GI bleeding Preliminary result      Allergies as of 4/1/2017  "    No Known Allergies      [facility]     [facility] Nurse Care Line - 24/7 Assistance  Unless otherwise directed by your provider, please contact [facility], our nurse care line that is available for 24/7 assistance.     Registered nurses in the [facility] provide clinical advisement, health education, appointment booking, and other advisory services.  Call for this free service at [phone].        Advance Directives     An advance directive is a document which, in the event you are no longer able to make decisions for yourself, tells your healthcare team what kind of treatment you do or do not want to receive, or who you would like to make those decisions for you.  If you do not currently have an advance directive, [facility] encourages you to create one.  For more information call:  [phone]), [phone] ([phone]),  or log on to [email or web address].        Language Assistance Services     ATTENTION: Language assistance services are available, free of charge. Please call [phone].      ATENCIÓN: Si habla español, tiene a foreman disposición servicios gratuitos de asistencia lingüística. Llame al [phone].     [facility] Ý: N?u b?n nói Ti?ng Vi?t, có các d?ch v? h? tr? ngôn ng? mi?n phí dành cho b?n. G?i s? [phone].        Blood Transfusion Reaction Signs and Symptoms     The blood you have received has been matched for you as carefully as possible. Most patients who receive a blood transfusion do not experience problems. However, there can be a delayed reaction that happens a few weeks after your blood transfusion. Contact your physician immediately if you experience any NEW SYMPTOMS listed below:     Fever greater than 100.4 degrees    Chills   Yellow color to your skin or eyes(Jaundice)   Back pain, chest pain, or pain at the infusion site   Weakness (more than usual)   Discomfort or uneasiness more than usual (Malaise)   Nausea or  vomiting   Shortness of breath, wheezing, or coughing   Higher or lower blood pressure than normal   Skin rash, itching, skin redness, or localized swelling (example: hands or feet)   Urinating less than normal   Urine appears reddish or orange and is darker than normal      Remember that some these signs may already exist for you--such as having chronic back pain or high blood pressure. You only need to look for and report to your doctor any new occurrences since your blood transfusion that are of concern.        Diabetes Discharge Instructions                                   [facility] Sign-Up     Activating your [facility] account is as easy as 1-2-3!     1) Visit [email or web address], select Sign Up Now, enter this activation code and your date of birth, then select Next.  [email or web address]  Expires: 5/16/2017  5:40 PM      2) Create a username and password to use when you visit [facility] in the future and select a security question in case you lose your password and select Next.    3) Enter your e-mail address and click Sign Up!    Additional Information  If you have questions, please e-mail [email or web address] or call [phone] to talk to our [facility] staff. Remember, [facility] is NOT to be used for urgent needs. For medical emergencies, dial 911.          [facility] complies with applicable Federal civil rights laws and does not discriminate on the basis of race, color, national origin, age, disability, or sex.

## 2017-03-30 NOTE — ASSESSMENT & PLAN NOTE
Well-controlled on a home regimen of metformin; will provide basal insulin along with insulin sliding scale.

## 2017-03-30 NOTE — PROGRESS NOTES
Reviewed H and P by my colleague and agree with A and P.  Patient presenting with lower GI bleed. Tag scan negative. H/H stable.  Spoke with GI. Patient will have colonoscopy on 3/31. Will monitor in the ICU today. Clinically stable.

## 2017-03-30 NOTE — SUBJECTIVE & OBJECTIVE
Past Medical History:   Diagnosis Date    Diabetes mellitus     Hypertension        Past Surgical History:   Procedure Laterality Date    OVARY SURGERY         Review of patient's allergies indicates:  No Known Allergies    No current facility-administered medications on file prior to encounter.      Current Outpatient Prescriptions on File Prior to Encounter   Medication Sig    ergocalciferol (ERGOCALCIFEROL) 50,000 unit Cap Take 1 capsule (50,000 Units total) by mouth every 7 days.    metformin (GLUCOPHAGE) 1000 MG tablet Take 1,000 mg by mouth 2 (two) times daily with meals.     Family History     None        Social History Main Topics    Smoking status: Never Smoker    Smokeless tobacco: Not on file    Alcohol use No    Drug use: No    Sexual activity: Not on file     Review of Systems   Constitutional: Positive for fatigue. Negative for activity change, appetite change, chills, diaphoresis, fever and unexpected weight change.   HENT: Negative.    Eyes: Negative.    Respiratory: Negative for cough, chest tightness, shortness of breath and wheezing.    Cardiovascular: Negative for chest pain, palpitations and leg swelling.   Gastrointestinal: Positive for blood in stool. Negative for abdominal distention, abdominal pain, constipation, diarrhea, nausea and vomiting.   Endocrine: Negative.    Genitourinary: Negative for dysuria and hematuria.   Musculoskeletal: Negative.    Neurological: Positive for dizziness, weakness and light-headedness. Negative for seizures and syncope.   Psychiatric/Behavioral: Negative.      Objective:     Vital Signs (Most Recent):  Temp: 98.9 °F (37.2 °C) (03/30/17 0021)  Pulse: 90 (03/30/17 0445)  Resp: 18 (03/30/17 0021)  BP: (!) 114/53 (03/30/17 0445)  SpO2: 100 % (03/30/17 0445) Vital Signs (24h Range):  Temp:  [98.9 °F (37.2 °C)] 98.9 °F (37.2 °C)  Pulse:  [] 90  Resp:  [18] 18  SpO2:  [95 %-100 %] 100 %  BP: ()/(52-62) 114/53     Weight: 69.4 kg (153 lb)  Body  mass index is 25.46 kg/(m^2).    Physical Exam   Constitutional: She is oriented to person, place, and time. She appears well-developed and well-nourished. No distress.   HENT:   Head: Normocephalic and atraumatic.   Right Ear: External ear normal.   Left Ear: External ear normal.   Nose: Nose normal.   Eyes: Right eye exhibits no discharge. Left eye exhibits no discharge.   Neck: Normal range of motion.   Cardiovascular:   Tachycardic without murmurs, borderline blood pressure   Pulmonary/Chest: Effort normal and breath sounds normal. No respiratory distress. She has no wheezes. She has no rales.   Abdominal: Soft. Bowel sounds are normal. She exhibits no distension. There is no tenderness. There is no rebound and no guarding.   Musculoskeletal: Normal range of motion. She exhibits no edema.   Neurological: She is alert and oriented to person, place, and time.   Skin: Skin is warm and dry. She is not diaphoretic. No erythema.   Psychiatric: She has a normal mood and affect. Her behavior is normal. Judgment and thought content normal.   Nursing note and vitals reviewed.       Significant Labs: All pertinent labs within the past 24 hours have been reviewed.    Significant Imaging: I have reviewed and interpreted all pertinent imaging results/findings within the past 24 hours.

## 2017-03-30 NOTE — PROGRESS NOTES
0655 - Pt arrived to ICU room 263 from ED via stretcher with ED RN.  Pt placed in ICU bed and monitors.  VSS at this time.  Pt alert and oriented.  1 unit PRBCs infusing.  Bleeding scan not yet completed, awaiting arrival of nuclear med team.

## 2017-03-31 ENCOUNTER — ANESTHESIA EVENT (OUTPATIENT)
Dept: ENDOSCOPY | Facility: HOSPITAL | Age: 77
DRG: 394 | End: 2017-03-31
Payer: COMMERCIAL

## 2017-03-31 ENCOUNTER — SURGERY (OUTPATIENT)
Age: 77
End: 2017-03-31

## 2017-03-31 ENCOUNTER — ANESTHESIA (OUTPATIENT)
Dept: ENDOSCOPY | Facility: HOSPITAL | Age: 77
DRG: 394 | End: 2017-03-31
Payer: COMMERCIAL

## 2017-03-31 VITALS — RESPIRATION RATE: 27 BRPM

## 2017-03-31 PROBLEM — E44.0 MALNUTRITION OF MODERATE DEGREE: Status: ACTIVE | Noted: 2017-03-31

## 2017-03-31 PROBLEM — K92.1 HEMATOCHEZIA: Status: RESOLVED | Noted: 2017-03-30 | Resolved: 2017-03-31

## 2017-03-31 PROBLEM — D72.829 LEUKOCYTOSIS: Status: ACTIVE | Noted: 2017-03-31

## 2017-03-31 PROBLEM — K92.1 HEMATOCHEZIA: Status: RESOLVED | Noted: 2017-03-31 | Resolved: 2017-03-31

## 2017-03-31 PROBLEM — K92.1 HEMATOCHEZIA: Status: ACTIVE | Noted: 2017-03-31

## 2017-03-31 PROBLEM — R53.81 DEBILITY: Status: ACTIVE | Noted: 2017-03-31

## 2017-03-31 LAB
ALBUMIN SERPL BCP-MCNC: 2.7 G/DL
ALP SERPL-CCNC: 47 U/L
ALT SERPL W/O P-5'-P-CCNC: 9 U/L
ANION GAP SERPL CALC-SCNC: 7 MMOL/L
APTT BLDCRRT: 25.8 SEC
AST SERPL-CCNC: 13 U/L
BASOPHILS # BLD AUTO: 0.02 K/UL
BASOPHILS # BLD AUTO: 0.03 K/UL
BASOPHILS # BLD AUTO: 0.04 K/UL
BASOPHILS NFR BLD: 0.1 %
BASOPHILS NFR BLD: 0.2 %
BASOPHILS NFR BLD: 0.3 %
BILIRUB SERPL-MCNC: 0.6 MG/DL
BUN SERPL-MCNC: 5 MG/DL
CALCIUM SERPL-MCNC: 8.7 MG/DL
CHLORIDE SERPL-SCNC: 106 MMOL/L
CO2 SERPL-SCNC: 29 MMOL/L
CREAT SERPL-MCNC: 0.7 MG/DL
DIFFERENTIAL METHOD: ABNORMAL
EOSINOPHIL # BLD AUTO: 0.2 K/UL
EOSINOPHIL NFR BLD: 1.1 %
EOSINOPHIL NFR BLD: 1.1 %
EOSINOPHIL NFR BLD: 1.4 %
ERYTHROCYTE [DISTWIDTH] IN BLOOD BY AUTOMATED COUNT: 15.2 %
ERYTHROCYTE [DISTWIDTH] IN BLOOD BY AUTOMATED COUNT: 15.3 %
ERYTHROCYTE [DISTWIDTH] IN BLOOD BY AUTOMATED COUNT: 15.3 %
EST. GFR  (AFRICAN AMERICAN): >60 ML/MIN/1.73 M^2
EST. GFR  (NON AFRICAN AMERICAN): >60 ML/MIN/1.73 M^2
GLUCOSE SERPL-MCNC: 104 MG/DL
HCT VFR BLD AUTO: 25.5 %
HCT VFR BLD AUTO: 26.5 %
HCT VFR BLD AUTO: 27.2 %
HGB BLD-MCNC: 8.5 G/DL
HGB BLD-MCNC: 8.9 G/DL
HGB BLD-MCNC: 9.3 G/DL
INR PPP: 1.1
LYMPHOCYTES # BLD AUTO: 1.6 K/UL
LYMPHOCYTES # BLD AUTO: 2.2 K/UL
LYMPHOCYTES # BLD AUTO: 2.3 K/UL
LYMPHOCYTES NFR BLD: 12 %
LYMPHOCYTES NFR BLD: 13.8 %
LYMPHOCYTES NFR BLD: 15.2 %
MAGNESIUM SERPL-MCNC: 1.7 MG/DL
MCH RBC QN AUTO: 27.1 PG
MCH RBC QN AUTO: 27.9 PG
MCH RBC QN AUTO: 28.2 PG
MCHC RBC AUTO-ENTMCNC: 33.3 %
MCHC RBC AUTO-ENTMCNC: 33.6 %
MCHC RBC AUTO-ENTMCNC: 34.2 %
MCV RBC AUTO: 81 FL
MCV RBC AUTO: 82 FL
MCV RBC AUTO: 84 FL
MONOCYTES # BLD AUTO: 0.8 K/UL
MONOCYTES # BLD AUTO: 1 K/UL
MONOCYTES # BLD AUTO: 1.2 K/UL
MONOCYTES NFR BLD: 5.7 %
MONOCYTES NFR BLD: 6.7 %
MONOCYTES NFR BLD: 7.5 %
NEUTROPHILS # BLD AUTO: 10.6 K/UL
NEUTROPHILS # BLD AUTO: 11.6 K/UL
NEUTROPHILS # BLD AUTO: 12.2 K/UL
NEUTROPHILS NFR BLD: 76.9 %
NEUTROPHILS NFR BLD: 77.3 %
NEUTROPHILS NFR BLD: 80.2 %
PHOSPHATE SERPL-MCNC: 1.9 MG/DL
PLATELET # BLD AUTO: 206 K/UL
PLATELET # BLD AUTO: 221 K/UL
PLATELET # BLD AUTO: 225 K/UL
PMV BLD AUTO: 8.7 FL
PMV BLD AUTO: 8.8 FL
PMV BLD AUTO: 9.2 FL
POCT GLUCOSE: 109 MG/DL (ref 70–110)
POCT GLUCOSE: 132 MG/DL (ref 70–110)
POCT GLUCOSE: 208 MG/DL (ref 70–110)
POTASSIUM SERPL-SCNC: 3.4 MMOL/L
PROT SERPL-MCNC: 6.1 G/DL
PROTHROMBIN TIME: 11.4 SEC
RBC # BLD AUTO: 3.05 M/UL
RBC # BLD AUTO: 3.28 M/UL
RBC # BLD AUTO: 3.3 M/UL
SODIUM SERPL-SCNC: 142 MMOL/L
WBC # BLD AUTO: 13.21 K/UL
WBC # BLD AUTO: 15.03 K/UL
WBC # BLD AUTO: 15.77 K/UL

## 2017-03-31 PROCEDURE — 63600175 PHARM REV CODE 636 W HCPCS: Performed by: EMERGENCY MEDICINE

## 2017-03-31 PROCEDURE — 63600175 PHARM REV CODE 636 W HCPCS: Performed by: NURSE ANESTHETIST, CERTIFIED REGISTERED

## 2017-03-31 PROCEDURE — 84100 ASSAY OF PHOSPHORUS: CPT

## 2017-03-31 PROCEDURE — 37000008 HC ANESTHESIA 1ST 15 MINUTES: Performed by: INTERNAL MEDICINE

## 2017-03-31 PROCEDURE — 45380 COLONOSCOPY AND BIOPSY: CPT | Performed by: INTERNAL MEDICINE

## 2017-03-31 PROCEDURE — 25000003 PHARM REV CODE 250: Performed by: NURSE ANESTHETIST, CERTIFIED REGISTERED

## 2017-03-31 PROCEDURE — 85025 COMPLETE CBC W/AUTO DIFF WBC: CPT

## 2017-03-31 PROCEDURE — 27201012 HC FORCEPS, HOT/COLD, DISP: Performed by: INTERNAL MEDICINE

## 2017-03-31 PROCEDURE — 25000003 PHARM REV CODE 250: Performed by: INTERNAL MEDICINE

## 2017-03-31 PROCEDURE — 85610 PROTHROMBIN TIME: CPT

## 2017-03-31 PROCEDURE — 36415 COLL VENOUS BLD VENIPUNCTURE: CPT

## 2017-03-31 PROCEDURE — 0DBM8ZX EXCISION OF DESCENDING COLON, VIA NATURAL OR ARTIFICIAL OPENING ENDOSCOPIC, DIAGNOSTIC: ICD-10-PCS | Performed by: INTERNAL MEDICINE

## 2017-03-31 PROCEDURE — 88305 TISSUE EXAM BY PATHOLOGIST: CPT | Performed by: PATHOLOGY

## 2017-03-31 PROCEDURE — 88305 TISSUE EXAM BY PATHOLOGIST: CPT | Mod: 26,,, | Performed by: PATHOLOGY

## 2017-03-31 PROCEDURE — 80053 COMPREHEN METABOLIC PANEL: CPT

## 2017-03-31 PROCEDURE — 37000009 HC ANESTHESIA EA ADD 15 MINS: Performed by: INTERNAL MEDICINE

## 2017-03-31 PROCEDURE — 83735 ASSAY OF MAGNESIUM: CPT

## 2017-03-31 PROCEDURE — C9113 INJ PANTOPRAZOLE SODIUM, VIA: HCPCS | Performed by: EMERGENCY MEDICINE

## 2017-03-31 PROCEDURE — D9220A PRA ANESTHESIA: Mod: ANES,,, | Performed by: ANESTHESIOLOGY

## 2017-03-31 PROCEDURE — 85730 THROMBOPLASTIN TIME PARTIAL: CPT

## 2017-03-31 PROCEDURE — 25000003 PHARM REV CODE 250: Performed by: EMERGENCY MEDICINE

## 2017-03-31 PROCEDURE — D9220A PRA ANESTHESIA: Mod: CRNA,,, | Performed by: NURSE ANESTHETIST, CERTIFIED REGISTERED

## 2017-03-31 PROCEDURE — 20000000 HC ICU ROOM

## 2017-03-31 RX ORDER — PROPOFOL 10 MG/ML
VIAL (ML) INTRAVENOUS
Status: DISPENSED
Start: 2017-03-31 | End: 2017-04-01

## 2017-03-31 RX ORDER — METRONIDAZOLE 500 MG/1
500 TABLET ORAL EVERY 8 HOURS
Status: DISCONTINUED | OUTPATIENT
Start: 2017-03-31 | End: 2017-04-01 | Stop reason: HOSPADM

## 2017-03-31 RX ORDER — PANTOPRAZOLE SODIUM 40 MG/1
40 TABLET, DELAYED RELEASE ORAL DAILY
Status: DISCONTINUED | OUTPATIENT
Start: 2017-03-31 | End: 2017-03-31

## 2017-03-31 RX ORDER — PROPOFOL 10 MG/ML
VIAL (ML) INTRAVENOUS
Status: DISCONTINUED | OUTPATIENT
Start: 2017-03-31 | End: 2017-03-31

## 2017-03-31 RX ORDER — PANTOPRAZOLE SODIUM 40 MG/1
40 TABLET, DELAYED RELEASE ORAL DAILY
Status: DISCONTINUED | OUTPATIENT
Start: 2017-04-01 | End: 2017-04-01 | Stop reason: HOSPADM

## 2017-03-31 RX ORDER — SODIUM CHLORIDE 9 MG/ML
INJECTION, SOLUTION INTRAVENOUS CONTINUOUS PRN
Status: DISCONTINUED | OUTPATIENT
Start: 2017-03-31 | End: 2017-03-31

## 2017-03-31 RX ORDER — CIPROFLOXACIN 500 MG/1
500 TABLET ORAL EVERY 12 HOURS
Status: DISCONTINUED | OUTPATIENT
Start: 2017-03-31 | End: 2017-04-01 | Stop reason: HOSPADM

## 2017-03-31 RX ADMIN — PROPOFOL 20 MG: 10 INJECTION, EMULSION INTRAVENOUS at 01:03

## 2017-03-31 RX ADMIN — METRONIDAZOLE 500 MG: 500 TABLET ORAL at 09:03

## 2017-03-31 RX ADMIN — DEXTROSE 8 MG/HR: 50 INJECTION, SOLUTION INTRAVENOUS at 05:03

## 2017-03-31 RX ADMIN — METRONIDAZOLE 500 MG: 500 INJECTION, SOLUTION INTRAVENOUS at 01:03

## 2017-03-31 RX ADMIN — PROPOFOL 20 MG: 10 INJECTION, EMULSION INTRAVENOUS at 12:03

## 2017-03-31 RX ADMIN — ACETAMINOPHEN 500 MG: 500 TABLET ORAL at 09:03

## 2017-03-31 RX ADMIN — METRONIDAZOLE 500 MG: 500 INJECTION, SOLUTION INTRAVENOUS at 05:03

## 2017-03-31 RX ADMIN — DEXTROSE 8 MG/HR: 50 INJECTION, SOLUTION INTRAVENOUS at 11:03

## 2017-03-31 RX ADMIN — SODIUM CHLORIDE: 0.9 INJECTION, SOLUTION INTRAVENOUS at 12:03

## 2017-03-31 RX ADMIN — CIPROFLOXACIN HYDROCHLORIDE 500 MG: 500 TABLET, FILM COATED ORAL at 09:03

## 2017-03-31 RX ADMIN — RAMELTEON 8 MG: 8 TABLET, FILM COATED ORAL at 09:03

## 2017-03-31 RX ADMIN — PROPOFOL 100 MG: 10 INJECTION, EMULSION INTRAVENOUS at 12:03

## 2017-03-31 RX ADMIN — DEXTROSE 8 MG/HR: 50 INJECTION, SOLUTION INTRAVENOUS at 12:03

## 2017-03-31 RX ADMIN — CIPROFLOXACIN 400 MG: 2 INJECTION, SOLUTION INTRAVENOUS at 05:03

## 2017-03-31 RX ADMIN — CIPROFLOXACIN 400 MG: 2 INJECTION, SOLUTION INTRAVENOUS at 04:03

## 2017-03-31 RX ADMIN — INSULIN DETEMIR 10 UNITS: 100 INJECTION, SOLUTION SUBCUTANEOUS at 09:03

## 2017-03-31 RX ADMIN — PANTOPRAZOLE SODIUM 40 MG: 40 TABLET, DELAYED RELEASE ORAL at 01:03

## 2017-03-31 RX ADMIN — INSULIN ASPART 2 UNITS: 100 INJECTION, SOLUTION INTRAVENOUS; SUBCUTANEOUS at 05:03

## 2017-03-31 NOTE — ANESTHESIA PREPROCEDURE EVALUATION
03/31/2017  Radha Morales is a 76 y.o., female.    [facility] Anesthesia Evaluation    I have reviewed the Patient Summary Reports.     I have reviewed the Medications.     Review of Systems  Anesthesia Hx:  History of prior surgery of interest to airway management or planning:   Social:  Non-Smoker, No Alcohol Use    Hematology/Oncology:         -- Anemia:   Cardiovascular:   Hypertension    Pulmonary:  Pulmonary Normal    Renal/:  Renal/ Normal     Hepatic/GI:  Hepatic/GI Normal    Endocrine:   Diabetes, poorly controlled        Physical Exam  General:  Obesity    Airway/Jaw/Neck:  Airway Findings: Mouth Opening: Normal Tongue: Normal  General Airway Assessment: Adult  Mallampati: II  TM Distance: 4 - 6 cm  Jaw/Neck Findings:  Neck ROM: Normal ROM      Dental:  Dental Findings: In tact   Chest/Lungs:  Chest/Lungs Findings: Normal Respiratory Rate     Heart/Vascular:  Heart Findings: Rate: Normal        Mental Status:  Mental Status Findings:  Cooperative         Anesthesia Plan  Type of Anesthesia, risks & benefits discussed:  Anesthesia Type:  general  Patient's Preference:   Intra-op Monitoring Plan: standard ASA monitors  Intra-op Monitoring Plan Comments:   Post Op Pain Control Plan:   Post Op Pain Control Plan Comments:   Induction:   IV  Beta Blocker:  Patient is not currently on a Beta-Blocker (No further documentation required).       Informed Consent: Patient understands risks and agrees with Anesthesia plan.  Questions answered. Anesthesia consent signed with patient.  ASA Score: 3     Day of Surgery Review of History & Physical:    H&P update referred to the provider.         Ready For Surgery From Anesthesia Perspective.

## 2017-03-31 NOTE — PROGRESS NOTES
[facility] Hospital Medicine  Progress Note    Patient Name: Radha Morales  MRN: [id number]  Patient Class: IP- Inpatient   Admission Date: 3/30/2017  Length of Stay: 1 days  Attending Physician: Teodoro Rogers MD  Primary Care Provider: Diego Medel Jr, MD        Subjective:     Principal Problem:Hematochezia    HPI:  Ms. Radha Morales is a 76 y.o. female known to me with essential hypertension and type 2 diabetes mellitus (HbA1c 6.4% Jun 2016) who presents to [facility] ED with complaints of hematochezia tonight. She reports that she had 3-4 episodes of bloody stools before she decided to present to the ED.  She had some weakness and lightheadedness but denies any shortness of breath, chest pain, palpitations, falls, nor any loss of consciousness.  She denies any other blood loss specifically denying any hematuria, vaginal bleeding, hemoptysis, hematemesis, nor any coffee-ground emesis.  She denies any nausea, vomiting, fevers, chills, nor any abdominal pain, and has never had similar symptoms in the past.  She does take Advil PM every night to help sleep.    Hospital Course:  The patient was admitted to the hospital for evaluation of a likely lower GI/diverticular bleed vs. Ischemic colitis as noted on CT of abdomen.. She had a bleeding scan that was negative. She received  2 units of blood. GI was consulted and the patient underwent a C-scope on 3/31 in the ICU. This showed wide spread diverticulosis throughout the colon without any signs of bleeding. Also found was congested mucosa in the descending colon- this colitis was very mild and biopsied.    The patient had no further bleeding and will be transferred to the floor on 3/31. The patient lives by herself and walks with a rolling walker and has [employer].  PT/OT were consulted for an eval on 3/31.      Interval History:  Doing well. No further bleeding     Review of Systems   Constitutional: Negative for activity change.  "  Respiratory: Negative for chest tightness and shortness of breath.    Cardiovascular: Negative for chest pain.   Gastrointestinal: Negative for abdominal distention, abdominal pain and blood in stool.     Objective:     Vital Signs (Most Recent):  Temp: 98.1 °F (36.7 °C) (03/31/17 0719)  Pulse: 76 (03/31/17 0719)  Resp: (!) 22 (03/31/17 0719)  BP: (!) 127/58 (03/31/17 0700)  SpO2: 98 % (03/31/17 0719) Vital Signs (24h Range):  Temp:  [97.5 °F (36.4 °C)-98.7 °F (37.1 °C)] 98.1 °F (36.7 °C)  Pulse:  [76-99] 76  Resp:  [14-64] 22  SpO2:  [88 %-100 %] 98 %  BP: (100-160)/(52-94) 127/58     Weight: 81.6 kg (179 lb 14.3 oz)  Body mass index is 29.94 kg/(m^2).    Intake/Output Summary (Last 24 hours) at 03/31/17 0734  Last data filed at 03/31/17 0700   Gross per 24 hour   Intake             2442 ml   Output             3820 ml   Net            -1378 ml      Physical Exam    Significant Labs:   BMP:   Recent Labs  Lab 03/30/17  0056   *   *   K 4.4   CL 98   CO2 23   BUN 16   CREATININE 1.1   CALCIUM 9.4     CBC:   Recent Labs  Lab 03/30/17  1941/17  2343 03/31/17  0611   WBC 21.63* 19.51* 15.03*   HGB 9.7* 8.7* 9.3*   HCT 29.1* 25.0* 27.2*    202 206       Significant Imaging    Assessment/Plan:      * Hematochezia  Patient's hematochezia would appear to be diverticular in etiology were it not for the CT-abd/pelvis findings of "[m]arked wall thickening extending from the mid aspect of the transverse colon to the level of the distal descending colon.  The findings are suggestive of ischemic colitis in the MIRELLA distribution.  Calcific plaque at the origin of the MIRELLA with patency of the MIRELLA.  Other differential consideration would include inflammatory/infectious colitis."  Gastroenterology has been consulted from the ED and had recommended transfusion for pRBC 2 units and a STAT nuclear bleeding scan that was negative. The patient noted no further bleeding after admission and went for C-scope on " 3/31.     Essential hypertension  She is currently not on antihypertensive medications at this time.      Type 2 diabetes mellitus, controlled  Well-controlled on a home regimen of metformin; will provide basal insulin along with insulin sliding scale. No other manifestations. Will order an A1c.     Ischemic colitis  As addressed above.    Leukocytosis  From acute GI bleed/ischemic colitis?        Malnutrition of moderate degree  No acute issues.       Debility  Has [employer]. Walks with rolling walker. Lives by herself.       VTE Risk Mitigation         Ordered     Medium Risk of VTE  Once      03/30/17 0657     Place TA hose  Until discontinued      03/30/17 0657     Place sequential compression device  Until discontinued      03/30/17 0657     Reason for No Pharmacological VTE Prophylaxis  Once      03/30/17 0657        C-scope this am. Will transfer to the floor after. PT/OT consult. Home if stable possibly on 4/1.     Critical care time spent 45 minutes.      Teodoro Apodaca MD  Department of Hospital Medicine   [facility]

## 2017-03-31 NOTE — SUBJECTIVE & OBJECTIVE
Interval History:  Doing well. No further bleeding     Review of Systems   Constitutional: Negative for activity change.   Respiratory: Negative for chest tightness and shortness of breath.    Cardiovascular: Negative for chest pain.   Gastrointestinal: Negative for abdominal distention, abdominal pain and blood in stool.     Objective:     Vital Signs (Most Recent):  Temp: 98.1 °F (36.7 °C) (03/31/17 0719)  Pulse: 76 (03/31/17 0719)  Resp: (!) 22 (03/31/17 0719)  BP: (!) 127/58 (03/31/17 0700)  SpO2: 98 % (03/31/17 0719) Vital Signs (24h Range):  Temp:  [97.5 °F (36.4 °C)-98.7 °F (37.1 °C)] 98.1 °F (36.7 °C)  Pulse:  [76-99] 76  Resp:  [14-64] 22  SpO2:  [88 %-100 %] 98 %  BP: (100-160)/(52-94) 127/58     Weight: 81.6 kg (179 lb 14.3 oz)  Body mass index is 29.94 kg/(m^2).    Intake/Output Summary (Last 24 hours) at 03/31/17 0734  Last data filed at 03/31/17 0700   Gross per 24 hour   Intake             2442 ml   Output             3820 ml   Net            -1378 ml      Physical Exam    Significant Labs:   BMP:   Recent Labs  Lab 03/30/17  0056   *   *   K 4.4   CL 98   CO2 23   BUN 16   CREATININE 1.1   CALCIUM 9.4     CBC:   Recent Labs  Lab 03/30/17  1941/17  2343 03/31/17  0611   WBC 21.63* 19.51* 15.03*   HGB 9.7* 8.7* 9.3*   HCT 29.1* 25.0* 27.2*    202 206       Significant Imaging

## 2017-03-31 NOTE — TRANSFER OF CARE
"Anesthesia Transfer of Care Note    Patient: Radah Morales    Procedure(s) Performed: Procedure(s) (LRB):  COLONOSCOPY (N/A)    Patient location: ICU    Transport from OR: Transported from OR on room air with adequate spontaneous ventilation    Post assessment: no apparent anesthetic complications and tolerated procedure well    Post vital signs: stable    Level of consciousness: awake, alert and oriented    Nausea/Vomiting: no nausea/vomiting    Complications: none          Last vitals:   Visit Vitals    /62    Pulse 86    Temp 36.4 °C (97.5 °F) (Oral)    Resp 14    Ht 5' 5" (1.651 m)    Wt 81.6 kg (179 lb 14.3 oz)    SpO2 100%    Breastfeeding No    BMI 29.94 kg/m2     "

## 2017-03-31 NOTE — ASSESSMENT & PLAN NOTE
Well-controlled on a home regimen of metformin; will provide basal insulin along with insulin sliding scale. No other manifestations. Will order an A1c.

## 2017-03-31 NOTE — DISCHARGE SUMMARY
[facility]  Discharge Summary      Admit Date: 3/30/2017    Discharge Date and Time:  03/31/2017 1:26 PM    Attending Physician: Teodoro Rogers MD     Reason for Admission: Hematochezia    Procedures Performed: Procedure(s) (LRB):  COLONOSCOPY (N/A)    Hospital Course (synopsis of major diagnoses, care, treatment, and services provided during the course of the hospital stay): Ongoing     Consults: GI    Significant Diagnostic Studies: Colonoscopy    Final Diagnoses:    Principal Problem: Hematochezia   Secondary Diagnoses: GI Bleed    Discharged Condition: good    Disposition: Admitted as an Inpatient    Follow Up/Patient Instructions: Follow-up with referring physician             Resume previous diet and activity.    Medications:  Transfer Medications (for Discharge Readmit only):   Current Facility-Administered Medications   Medication Dose Route Frequency Provider Last Rate Last Dose    0.9%  NaCl infusion (for blood administration)   Intravenous Q24H PRN Bhakti Augustine MD        acetaminophen tablet 500 mg  500 mg Oral Q6H PRN Rosenda Aburto MD        ciprofloxacin (CIPRO)400mg/200ml D5W IVPB 400 mg  400 mg Intravenous Q12H Bhakti Augustine  mL/hr at 03/31/17 0539 400 mg at 03/31/17 0539    dextrose 50% injection 12.5 g  12.5 g Intravenous PRN Bhakti Augustine MD        glucagon (human recombinant) injection 1 mg  1 mg Intramuscular PRN Bhakti Augustine MD        insulin aspart pen 0-5 Units  0-5 Units Subcutaneous Q6H PRN Bhakti Augustine MD   2 Units at 03/30/17 1107    insulin detemir pen 10 Units  10 Units Subcutaneous QHS Rosenda Aburto MD   10 Units at 03/30/17 2100    metronidazole IVPB 500 mg  500 mg Intravenous Q8H Bhakti Augustine  mL/hr at 03/31/17 1300 500 mg at 03/31/17 1300    pantoprozole 40 mg in dextrose 5 % 100 mL infusion (ready to mix system)  8 mg/hr Intravenous Continuous Bhakti Augustine MD 20  mL/hr at 03/31/17 1300 8 mg/hr at 03/31/17 1300    promethazine (PHENERGAN) 12.5 mg in dextrose 5 % 50 mL IVPB  12.5 mg Intravenous Q6H PRN Rosenda Aburto MD        ramelteon tablet 8 mg  8 mg Oral Nightly PRN Rosenda Aburto MD         Facility-Administered Medications Ordered in Other Encounters   Medication Dose Route Frequency Provider Last Rate Last Dose    0.9%  NaCl infusion    Continuous PRN Miguel Perez CRNA        propofol (DIPRIVAN) 10 mg/mL infusion    PRN Miguel Perez CRNA   20 mg at 03/31/17 1317     No discharge procedures on file.

## 2017-03-31 NOTE — PROGRESS NOTES
SW met with pt at bedside. Pt doing well and stable. SW informed pt to schedule PCP appointment once discharged from hospital. Pt voiced understanding. Pt to transfer to floor. PT/OT consulted on pt for further needs. Plan is home with doctor appointments.

## 2017-03-31 NOTE — ASSESSMENT & PLAN NOTE
"Patient's hematochezia would appear to be diverticular in etiology were it not for the CT-abd/pelvis findings of "[m]arked wall thickening extending from the mid aspect of the transverse colon to the level of the distal descending colon.  The findings are suggestive of ischemic colitis in the MIRELLA distribution.  Calcific plaque at the origin of the MIRELLA with patency of the MIRELLA.  Other differential consideration would include inflammatory/infectious colitis."  Gastroenterology has been consulted from the ED and had recommended transfusion for pRBC 2 units and a STAT nuclear bleeding scan that was negative. The patient noted no further bleeding after admission and went for C-scope on 3/31.   "

## 2017-03-31 NOTE — ANESTHESIA POSTPROCEDURE EVALUATION
"Anesthesia Post Evaluation    Patient: Radha Morales    Procedure(s) Performed: Procedure(s) (LRB):  COLONOSCOPY (N/A)    Final Anesthesia Type: general  Patient location during evaluation: ICU  Patient participation: Yes- Able to Participate  Level of consciousness: awake  Post-procedure vital signs: reviewed and stable  Pain management: adequate  Airway patency: patent  PONV status at discharge: No PONV  Anesthetic complications: no      Cardiovascular status: stable  Respiratory status: unassisted  Hydration status: euvolemic  Follow-up not needed.        Visit Vitals    /62    Pulse 86    Temp 36.4 °C (97.5 °F) (Oral)    Resp 14    Ht 5' 5" (1.651 m)    Wt 81.6 kg (179 lb 14.3 oz)    SpO2 100%    Breastfeeding No    BMI 29.94 kg/m2       Pain/Karlee Score: Pain Assessment Performed: Yes (3/31/2017  1:00 PM)  Presence of Pain: non-verbal indicators absent (3/31/2017  1:00 PM)      "

## 2017-03-31 NOTE — PLAN OF CARE
Problem: Patient Care Overview  Goal: Plan of Care Review  Outcome: Ongoing (interventions implemented as appropriate)    03/31/17 2066   Coping/Psychosocial   Plan Of Care Reviewed With patient      Patient was admitted yesterday for acute lower GI bleed. Had received Blood and h/h are stable with 8.7/25 last result at midnight. Had a negative  bleeding scan done yesterday . She was placed on NPO post midnight for colonoscopy this morning. Go-lytely prep were followed tolerated well and with clear output this morning. Vital signs stable and within limits

## 2017-04-01 VITALS
BODY MASS INDEX: 29.97 KG/M2 | TEMPERATURE: 99 F | DIASTOLIC BLOOD PRESSURE: 59 MMHG | OXYGEN SATURATION: 99 % | WEIGHT: 179.88 LBS | HEIGHT: 65 IN | SYSTOLIC BLOOD PRESSURE: 132 MMHG | HEART RATE: 86 BPM | RESPIRATION RATE: 20 BRPM

## 2017-04-01 LAB
ALBUMIN SERPL BCP-MCNC: 2.7 G/DL
ALP SERPL-CCNC: 51 U/L
ALT SERPL W/O P-5'-P-CCNC: 9 U/L
ANION GAP SERPL CALC-SCNC: 5 MMOL/L
AST SERPL-CCNC: 15 U/L
BASOPHILS # BLD AUTO: 0.02 K/UL
BASOPHILS NFR BLD: 0.1 %
BILIRUB SERPL-MCNC: 0.3 MG/DL
BUN SERPL-MCNC: 7 MG/DL
CALCIUM SERPL-MCNC: 8.5 MG/DL
CHLORIDE SERPL-SCNC: 104 MMOL/L
CO2 SERPL-SCNC: 27 MMOL/L
CREAT SERPL-MCNC: 0.8 MG/DL
DIFFERENTIAL METHOD: ABNORMAL
EOSINOPHIL # BLD AUTO: 0.2 K/UL
EOSINOPHIL NFR BLD: 1.5 %
ERYTHROCYTE [DISTWIDTH] IN BLOOD BY AUTOMATED COUNT: 15.4 %
EST. GFR  (AFRICAN AMERICAN): >60 ML/MIN/1.73 M^2
EST. GFR  (NON AFRICAN AMERICAN): >60 ML/MIN/1.73 M^2
GLUCOSE SERPL-MCNC: 141 MG/DL
HCT VFR BLD AUTO: 25.6 %
HGB BLD-MCNC: 8.6 G/DL
HGB BLD-MCNC: 8.9 G/DL
LYMPHOCYTES # BLD AUTO: 1.5 K/UL
LYMPHOCYTES NFR BLD: 10.6 %
MCH RBC QN AUTO: 28.2 PG
MCHC RBC AUTO-ENTMCNC: 33.6 %
MCV RBC AUTO: 84 FL
MONOCYTES # BLD AUTO: 0.8 K/UL
MONOCYTES NFR BLD: 6 %
NEUTROPHILS # BLD AUTO: 11.2 K/UL
NEUTROPHILS NFR BLD: 81.8 %
PLATELET # BLD AUTO: 240 K/UL
PMV BLD AUTO: 8.9 FL
POCT GLUCOSE: 154 MG/DL (ref 70–110)
POCT GLUCOSE: 177 MG/DL (ref 70–110)
POCT GLUCOSE: 178 MG/DL (ref 70–110)
POCT GLUCOSE: 230 MG/DL (ref 70–110)
POTASSIUM SERPL-SCNC: 3.7 MMOL/L
PROT SERPL-MCNC: 6.3 G/DL
RBC # BLD AUTO: 3.05 M/UL
SODIUM SERPL-SCNC: 136 MMOL/L
WBC # BLD AUTO: 13.71 K/UL

## 2017-04-01 PROCEDURE — 97165 OT EVAL LOW COMPLEX 30 MIN: CPT

## 2017-04-01 PROCEDURE — 97116 GAIT TRAINING THERAPY: CPT

## 2017-04-01 PROCEDURE — 25000003 PHARM REV CODE 250: Performed by: INTERNAL MEDICINE

## 2017-04-01 PROCEDURE — 36415 COLL VENOUS BLD VENIPUNCTURE: CPT

## 2017-04-01 PROCEDURE — 97161 PT EVAL LOW COMPLEX 20 MIN: CPT

## 2017-04-01 PROCEDURE — G8978 MOBILITY CURRENT STATUS: HCPCS | Mod: CI

## 2017-04-01 PROCEDURE — G8979 MOBILITY GOAL STATUS: HCPCS | Mod: CI

## 2017-04-01 PROCEDURE — 85018 HEMOGLOBIN: CPT

## 2017-04-01 PROCEDURE — G8988 SELF CARE GOAL STATUS: HCPCS | Mod: CJ

## 2017-04-01 PROCEDURE — G8989 SELF CARE D/C STATUS: HCPCS | Mod: CJ

## 2017-04-01 PROCEDURE — 83036 HEMOGLOBIN GLYCOSYLATED A1C: CPT

## 2017-04-01 PROCEDURE — 80053 COMPREHEN METABOLIC PANEL: CPT

## 2017-04-01 PROCEDURE — G8980 MOBILITY D/C STATUS: HCPCS | Mod: CI

## 2017-04-01 PROCEDURE — 85025 COMPLETE CBC W/AUTO DIFF WBC: CPT

## 2017-04-01 PROCEDURE — G8987 SELF CARE CURRENT STATUS: HCPCS | Mod: CJ

## 2017-04-01 RX ORDER — CIPROFLOXACIN 500 MG/1
500 TABLET ORAL EVERY 12 HOURS
Qty: 20 TABLET | Refills: 0 | Status: SHIPPED | OUTPATIENT
Start: 2017-04-01 | End: 2017-04-11

## 2017-04-01 RX ORDER — METRONIDAZOLE 500 MG/1
500 TABLET ORAL EVERY 8 HOURS
Qty: 30 TABLET | Refills: 0 | Status: SHIPPED | OUTPATIENT
Start: 2017-04-01 | End: 2017-04-11

## 2017-04-01 RX ORDER — VALSARTAN 320 MG/1
160 TABLET ORAL DAILY
Qty: 15 TABLET | Refills: 0 | Status: SHIPPED | OUTPATIENT
Start: 2017-04-01 | End: 2019-02-20

## 2017-04-01 RX ADMIN — CIPROFLOXACIN HYDROCHLORIDE 500 MG: 500 TABLET, FILM COATED ORAL at 09:04

## 2017-04-01 RX ADMIN — METRONIDAZOLE 500 MG: 500 TABLET ORAL at 05:04

## 2017-04-01 RX ADMIN — ACETAMINOPHEN 500 MG: 500 TABLET ORAL at 02:04

## 2017-04-01 RX ADMIN — ACETAMINOPHEN 500 MG: 500 TABLET ORAL at 04:04

## 2017-04-01 RX ADMIN — INSULIN ASPART 2 UNITS: 100 INJECTION, SOLUTION INTRAVENOUS; SUBCUTANEOUS at 06:04

## 2017-04-01 RX ADMIN — METRONIDAZOLE 500 MG: 500 TABLET ORAL at 02:04

## 2017-04-01 RX ADMIN — PANTOPRAZOLE SODIUM 40 MG: 40 TABLET, DELAYED RELEASE ORAL at 09:04

## 2017-04-01 NOTE — PLAN OF CARE
Problem: Patient Care Overview  Goal: Plan of Care Review  Outcome: Ongoing (interventions implemented as appropriate)  Patient up in chair today,  And ambulated to the bathroom without difficulty with walker.   V/S stable and Blood Glucose accu checks WNL today.

## 2017-04-01 NOTE — NURSING
Patient discharged although waiting on her ride (daughter).    Patient given discharge instructions and verbalized understanding of instructions.     No distress.   Patient to be wheeled off of unit in wheelchair.

## 2017-04-01 NOTE — PLAN OF CARE
Problem: Occupational Therapy Goal  Goal: Occupational Therapy Goal  Outcome: Ongoing (interventions implemented as appropriate)  Patient completed OT evaluation and presents at Supervision level.  OT to follow while in house to prevent deconditioning 3x a week.  DME recs: TTB and [facility] PT.

## 2017-04-01 NOTE — PLAN OF CARE
Problem: Physical Therapy Goal  Goal: Physical Therapy Goal  Outcome: Ongoing (interventions implemented as appropriate)  Pt OK for D/C home from PT standpoint.  Recommend HHPT and TTB upon D/C

## 2017-04-01 NOTE — PT/OT/SLP EVAL
Physical Therapy  Evaluation/Discharge summary    Radha Morales   MRN: [id number]   Admitting Diagnosis: Hematochezia    PT Received On: 17  PT Start Time: 1153     PT Stop Time: 1216    PT Total Time (min): 23 min       Billable Minutes:  Evaluation 15 and Gait Training8    Diagnosis: Hematochezia      Past Medical History:   Diagnosis Date    Diabetes mellitus     Hypertension       Past Surgical History:   Procedure Laterality Date    OVARY SURGERY         General Precautions: Standard, fall  Orthopedic Precautions: N/A   Braces: N/A       Do you have any cultural, spiritual, Gnosticism conflicts, given your current situation?: none    Patient History:  Lives With: alone  Home Accessibility:  (no concerns)  Transportation Available: family or friend will provide  Living Environment Comment: Adult children check on her daily  Equipment Currently Used at Home: walker, rolling  DME owned (not currently used): none    Previous Level of Function:  Ambulation Skills: needs device  Transfer Skills: needs device  ADL Skills: independent    Subjective:  Communicated with nsg prior to session.  Pt agreeable to eval  Chief Complaint: L knee pain  Patient goals: decreased L knee pain  Pain Ratin/10         Location:  (L knee)          Objective:         Cognitive Exam:  Oriented to: Person, Place, Time and Situation    Follows Commands/attention: Follows one-step commands  Communication: clear/fluent  Safety awareness/insight to disability: intact    Physical Exam:  Postural examination/scapula alignment: Rounded shoulder and Head forward    Skin integrity: Visible skin intact  Edema: Moderate L knee    Sensation:   Intact  light/touch B Le's    Upper Extremity Range of Motion:  Lower Extremity Range of Motion:  Right Lower Extremity: WFL  Left Lower Extremity: WFL    Lower Extremity Strength:  Right Lower Extremity: WFL  Left Lower Extremity: WFL except knee ext and Dflx 4/5     Fine motor  coordination:  N/T    Gross motor coordination: WFL    Functional Mobility:  Bed Mobility:       Transfers:  Sit <> Stand Assistance: Stand By Assistance (increased time to perform VC for hand placement and safety)  Sit <> Stand Assistive Device: Rolling Walker    Gait:   Gait Distance: 90' with Vc for increased trunk extension, sequencing with RW, and distribution of weight through UE's to RW to unweight L LE  Gait Assistive Device: Rolling walker  Gait Pattern: 3-point gait  Gait Deviation(s): decreased zita, increased time in double stance    Stairs:  N/T    Balance:   Static Sit: GOOD-: Takes MODERATE challenges from all directions but inconsistently  Dynamic Sit: GOOD-: Maintains balance through MODERATE excursions of active trunk movement,     Static Stand: GOOD-: Takes MODERATE challenges from all directions inconsistently  Dynamic stand: GOOD-: Needs SUPERVISION only during gait and able to self right with moderate     Therapeutic Activities and Exercises:  eval and gait training as above  AM-PAC 6 CLICK MOBILITY  How much help from another person does this patient currently need?   1 = Unable, Total/Dependent Assistance  2 = A lot, Maximum/Moderate Assistance  3 = A little, Minimum/Contact Guard/Supervision  4 = None, Modified [place]/Independent    Turning over in bed (including adjusting bedclothes, sheets and blankets)?: 4  Sitting down on and standing up from a chair with arms (e.g., wheelchair, bedside commode, etc.): 4  Moving from lying on back to sitting on the side of the bed?: 4  Moving to and from a bed to a chair (including a wheelchair)?: 4  Need to walk in hospital room?: 4  Climbing 3-5 steps with a railing?: 3  Total Score: 23     AM-PAC Raw Score CMS G-Code Modifier Level of Impairment Assistance   6 % Total / Unable   7 - 9 CM 80 - 100% Maximal Assist   10 - 14 CL 60 - 80% Moderate Assist   15 - 19 CK 40 - 60% Moderate Assist   20 - 22 CJ 20 - 40% Minimal Assist   23 CI  1-20% SBA / CGA   24 CH 0% Independent/ Mod I     Patient left up in chair with all lines intact, call button in reach and nsg notified.    Assessment:   Radha Morales is a 76 y.o. female with a medical diagnosis of Hematochezia and presents with no significant deficits warranting skilled PT services at this level of care.      Rehab identified problem list/impairments:      Rehab potential is good.    Activity tolerance: Good    Discharge recommendations:   HHPT    Barriers to discharge:  none    Equipment recommendations:       GOALS:   Physical Therapy Goals        Problem: Physical Therapy Goal    Goal Priority Disciplines Outcome Goal Variances Interventions   Physical Therapy Goal     PT/OT, PT Ongoing (interventions implemented as appropriate)               PLAN:    Patient to be seen   to address the above listed problems via    Plan of Care expires:    Plan of Care reviewed with:      Functional Assessment Tool Used: AM-PAC  Score: 23  Functional Limitation: Mobility: Walking and moving around  Mobility: Walking and Moving Around Current Status (): CI  Mobility: Walking and Moving Around Goal Status (): CI  Mobility: Walking and Moving Around Discharge Status (): CI     Alana Foley, PT  04/01/2017

## 2017-04-01 NOTE — PLAN OF CARE
[facility]    HOME HEALTH ORDERS  FACE TO FACE ENCOUNTER    Patient Name: Radha Morales  YOB: 1940    PCP: Diego Medel Jr, MD   PCP Address: [address] [place] [address] / [place] [id number]  PCP Phone Number: [phone]  PCP Fax: [phone]    Encounter Date: 04/01/2017    Admit to Home Health    Diagnoses:  Active Hospital Problems    Diagnosis  POA    Leukocytosis [D72.829]  Yes    Malnutrition of moderate degree [E44.0]  Yes    Debility [R53.81]  Yes    Ischemic colitis [K55.9]  Yes    Essential hypertension [I10]  Yes     Chronic    Type 2 diabetes mellitus, controlled [E11.9]  Yes     Chronic      Resolved Hospital Problems    Diagnosis Date Resolved POA    *Hematochezia [K92.1] 03/31/2017 Yes    Hematochezia [K92.1] 03/31/2017 Yes       No future appointments.  Follow-up Information     Follow up with Diego Medel Jr, MD In 1 week.    Specialty:  Family Medicine    Why:  Call to schedule appointment with Dr. Medel on Monday. Once scheduled, bring all discharge paperwork and all medications.     Contact information:    [address] [place] [id number]  [phone]              I have seen and examined this patient face to face today. My clinical findings that support the need for the home health skilled services and home bound status are the following:  Weakness/numbness causing balance and gait disturbance due to Anemia making it taxing to leave home.    Allergies:Review of patient's allergies indicates:  No Known Allergies    Diet: diabetic diet: 1800 calorie    Activities: activity as tolerated    Nursing:   SN to complete comprehensive assessment including routine vital signs. Instruct on disease process and s/s of complications to report to MD. Review/verify medication list sent home with the patient at time of discharge  and instruct patient/caregiver as needed. Frequency may be adjusted depending on start of  care date.    Notify MD if SBP > 160 or < 90; DBP > 90 or < 50; HR > 120 or < 50; Temp > 101; Other:         CONSULTS:    Physical Therapy to evaluate and treat. Evaluate for home safety and equipment needs; Establish/upgrade home exercise program. Perform / instruct on therapeutic exercises, gait training, transfer training, and Range of Motion.  Occupational Therapy to evaluate and treat. Evaluate home environment for safety and equipment needs. Perform/Instruct on transfers, ADL training, ROM, and therapeutic exercises.  Aide to provide assistance with personal care, ADLs, and vital signs.    MISCELLANEOUS CARE:  Diabetic Care:   SN to perform and educate Diabetic management with blood glucose monitoring:, Fingerstick blood sugar AC and HS and Report CBG < 60 or > 350 to physician.        Medications: Review discharge medications with patient and family and provide education.      Current Discharge Medication List      START taking these medications    Details   ciprofloxacin HCl (CIPRO) 500 MG tablet Take 1 tablet (500 mg total) by mouth every 12 (twelve) hours.  Qty: 20 tablet, Refills: 0      metronidazole (FLAGYL) 500 MG tablet Take 1 tablet (500 mg total) by mouth every 8 (eight) hours.  Qty: 30 tablet, Refills: 0         CONTINUE these medications which have CHANGED    Details   valsartan (DIOVAN) 320 MG tablet Take 0.5 tablets (160 mg total) by mouth once daily. Hold for BP less than 130/80  Qty: 15 tablet, Refills: 0         CONTINUE these medications which have NOT CHANGED    Details   ergocalciferol (ERGOCALCIFEROL) 50,000 unit Cap Take 1 capsule (50,000 Units total) by mouth every 7 days.  Qty: 12 capsule, Refills: 0      metformin (GLUCOPHAGE) 1000 MG tablet Take 1,000 mg by mouth 2 (two) times daily with meals.         STOP taking these medications       hydrochlorothiazide (HYDRODIURIL) 25 MG tablet Comments:   Reason for Stopping:               I certify that this patient is confined to her home  and needs intermittent skilled nursing care, physical therapy and occupational therapy.

## 2017-04-01 NOTE — PLAN OF CARE
Problem: Occupational Therapy Goal  Goal: Occupational Therapy Goal  Goals to be met by: 4/15/2017     Patient will increase functional independence with ADLs by performing:     LE Dressing with Modified [place].  Grooming while standing at sink with [place].  Toileting from toilet with [place] for hygiene and clothing management.   Bathing from  sitting at sink with Modified [place].  Supine to sit with [place].  Stand pivot transfers with Modified [place].  Toilet transfer to toilet with Modified [place].  Upper extremity exercise program x15 reps per handout, with independence.

## 2017-04-01 NOTE — PT/OT/SLP EVAL
Occupational Therapy  Evaluation    Radha Morales   MRN: [id number]   Admitting Diagnosis: Hematochezia    OT Date of Treatment: 17   OT Start Time: 1153  OT Stop Time: 1216  OT Total Time (min): 23 min    Billable Minutes:  Evaluation 23    Diagnosis: Hematochezia   1. Gastrointestinal hemorrhage, unspecified gastrointestinal hemorrhage type    2. GI bleed    3. Colitis        Past Medical History:   Diagnosis Date    Diabetes mellitus     Hypertension       Past Surgical History:   Procedure Laterality Date    OVARY SURGERY         Referring physician: Dr. Rogers  Date referred to OT: 3/31/2017    General Precautions: Standard, fall  Orthopedic Precautions: N/A  Braces: N/A    Patient History:  Living Environment  Lives With: alone  Living Arrangements: house  Home Layout: Able to live on 1st floor  Stair Railings at Home: none  Transportation Available: family or friend will provide, car  Living Environment Comment: Patient lives alone with daily checks from grown children, who help with transportation, and home axs as needed.  Equipment Currently Used at Home: walker, rolling    Prior level of function:   Bed Mobility/Transfers: needs device  Grooming: independent  Bathing: independent  Upper Body Dressing: independent  Lower Body Dressing: independent  Toileting: independent  Home Management Skills: independent  Homemaking Responsibilities: Yes  Driving License: Yes  Mode of Transportation: Family, Car  Occupation: Retired     Dominant hand: right    Subjective:  Communicated with nurse prior to session.    Chief Complaint: L knee pain  Patient/Family stated goals: Improve my balance and stay ax    Pain Ratin/10  Location - Side: Left     Location: knee     Objective:       Cognitive Exam:  Oriented to: Person, Place, Time and Situation  Follows Commands/attention: Follows multistep  commands  Communication: clear/fluent  Memory:  No Deficits noted  Safety awareness/insight to disability:  intact  Coping skills/emotional control: Appropriate to situation    Visual/perceptual:  Intact    Physical Exam:  Postural examination/scapula alignment: Rounded shoulder  Skin integrity: Visible skin intact  Edema: Mild L knee    Sensation:   Intact    Upper Extremity Range of Motion:  Right Upper Extremity: WNL  Left Upper Extremity: WNL    Upper Extremity Strength:  Right Upper Extremity: WNL  Left Upper Extremity: WNL   Strength: WNL    Fine motor coordination:   Intact    Gross motor coordination: WFL    Functional Mobility:  Bed Mobility:       Transfers:  Sit <> Stand Assistance: Supervision  Sit <> Stand Assistive Device: Rolling Walker  Toilet Transfer Assistance: Supervision (Per patient report)    Functional Ambulation: Supervision with RW into [place] and back to BS chair    Activities of Daily Living:  Feeding Level of Assistance: Independent  UE Dressing Level of Assistance: Set-up Assistance  LE Dressing Level of Assistance: Set-up Assistance  Grooming Position: Seated  Grooming Level of Assistance: Supervision  Toileting Where Assessed: Toilet (Per patient report)  Toileting Level of Assistance: Modified independent     Balance:   Static Sit: NORMAL: No deviations seen in posture held statically  Dynamic Sit: GOOD+: Maintains balance through MAXIMAL excursions of active trunk motion  Static Stand: GOOD: Takes MODERATE challenges from all directions  Dynamic stand: GOOD-: Needs SUPERVISION only during gait and able to self right with moderate       AM-PAC 6 CLICK ADL  How much help from another person does this patient currently need?  1 = Unable, Total/Dependent Assistance  2 = A lot, Maximum/Moderate Assistance  3 = A little, Minimum/Contact Guard/Supervision  4 = None, Modified [place]/Independent    Putting on and taking off regular lower body clothing? : 3  Bathing (including washing, rinsing, drying)?: 3  Toileting, which includes using toilet, bedpan, or urinal? : 3  Putting on and  "taking off regular upper body clothing?: 4  Taking care of personal grooming such as brushing teeth?: 4  Eating meals?: 4  Total Score: 21    AM-PAC Raw Score CMS "G-Code Modifier Level of Impairment Assistance   6 % Total / Unable   7 - 9 CM 80 - 100% Maximal Assist   10 - 14 CL 60 - 80% Moderate Assist   15 - 19 CK 40 - 60% Moderate Assist   20 - 22 CJ 20 - 40% Minimal Assist   23 CI 1-20% SBA / CGA   24 CH 0% Independent/ Mod I       Patient left up in chair with all lines intact and call button in reach    Assessment:  Radha Morales is a 76 y.o. female with a medical diagnosis of Hematochezia and presents with L knee pain affecting gait, balance, transfers independence.  OT to follow patient while in house and patient should continue PT for gait at discharge.    Rehab identified problem list/impairments: Rehab identified problem list/impairments: weakness, impaired functional mobilty, gait instability, impaired balance, decreased lower extremity function    Rehab potential is excellent.    Activity tolerance: Excellent    Discharge recommendations:   Home    Barriers to discharge: Barriers to Discharge: None    Equipment recommendations:   Shower chair    GOALS:   Occupational Therapy Goals        Problem: Occupational Therapy Goal    Goal Priority Disciplines Outcome Interventions   Occupational Therapy Goal     OT, PT/OT Ongoing (interventions implemented as appropriate)              PLAN:  Patient to be seen 3 x/week to address the above listed problems via self-care/home management, therapeutic activities, therapeutic exercises  Plan of Care expires: 04/15/17  Plan of Care reviewed with: patient         Rita ARCHER Kalyan, OT  04/01/2017  "

## 2017-04-01 NOTE — NURSING
Pt arrived to unit with no acute signs/symptoms of distress. Able to transfer from stretcher to bed independently. Ocasio catheter intact and patent draining clear yellow urine, discontinued per nursing protocol. Pt now due to void. Assisted to sit up in chair per patient request. Call bell in reach. Will monitor.

## 2017-04-02 LAB
ESTIMATED AVG GLUCOSE: 137 MG/DL
HBA1C MFR BLD HPLC: 6.4 %

## 2017-04-04 ENCOUNTER — PATIENT OUTREACH (OUTPATIENT)
Dept: ADMINISTRATIVE | Facility: CLINIC | Age: 77
End: 2017-04-04
Payer: COMMERCIAL

## 2017-04-04 NOTE — PATIENT INSTRUCTIONS
When You Have Gastrointestinal (GI) Bleeding  Blood in vomit or stool can be a sign of gastrointestinal (GI) bleeding. GI bleeding can be scary, but the cause of the bleeding is usually not serious. Still, you should ALWAYS see a doctor if GI bleeding occurs.  The GI Tract  The GI tract is the path that food travels through the body. Food passes from the mouth down the esophagus (the tube from the mouth to the stomach). Food begins to break down in the stomach. It then moves through the duodenum, the first part of the small intestine. Nutrients are absorbed as food travels through the small intestine. What is left passes into the colon (large intestine) as waste. The colon removes water from the waste. Waste continues from the colon to the rectum (where stool is stored). Waste then leaves the body through the anus.  Causes of GI Bleeding  GI bleeding can be caused by many different problems. Some of the more common causes include:  Hemorrhoids  Ulcer (sore on the lining of the GI tract)  Cuts or scrapes in the mouth or throat  Infection (bacteria or parasites)  Food allergies  Medications  Inflammation (swelling or irritation of the lining of the GI tract)  Polyps (growths of tissue)  Abnormal pouches in part of the GI tract  Tears in the anus  Nosebleed  Diagnosing the Cause of Blood in Stool  If blood is coming out in your stool, it may signal a lower GI tract problem. Bleeding from the lower GI tract can be bright red, or it may look dark and tarry. Occult blood cant be seen with the eye, but can be found in the stool on tests. To determine the cause, tests that may be ordered include:  Blood tests  Hemoccult test: checks a stool sample for blood  Stool culture: checks a stool sample for bacteria or parasites  X-ray, ultrasound, or CT scan: imaging tests that take pictures of the digestive tract  Colonoscopy or sigmoidoscopy: a test during which a flexible tube with a camera is inserted through the anus into the  rectum to view the inside of your colon. This lets the doctor do a biopsy (take a tiny tissue sample).  Diagnosing the Cause of Blood in Vomit  Vomiting blood may signal an upper GI tract problem. To determine the cause, tests that may be ordered include:  Endoscopy: a test during which a flexible tube with a camera is inserted through the mouth and throat to see inside the upper GI tract. This lets the doctor do a biopsy (take a tiny tissue sample).  X-ray, ultrasound, or CT scan: tests that take pictures of the digestive tract  Upper GI series: x-rays of the upper part of the GI tract taken from inside the body    Call your healthcare provider right away if you have any of the following:  Bleeding from the mouth or anus that cant be stopped  Fever of 100.4°F or higher  Bleeding accompanied by lighheadedness or dizziness  Signs of dehydration (dry, sticky mouth; decreased urine output; very dark urine)   © 8143-9495 [facility], [address] [place] [address], [place] [address]. All rights reserved. This information is not intended as a substitute for professional medical care. Always follow your healthcare professional's instructions.

## 2017-04-04 NOTE — Clinical Note
Please forward this important TCC information to your provider in order to maximize the post discharge care delivery of this patient.  C3 nurse spoke with Radha Barreraell  for a TCC post hospital discharge follow up call. The patient has a scheduled HOSFU appointment with Diego Medel Jr, MD on 4/11 @ 9761.  Respectfully, Angelika Ricardo RN  [facility]   [email or web address]     Please do not reply to this message, as this inbox is not routinely monitored.

## 2017-08-18 ENCOUNTER — HOSPITAL ENCOUNTER (OUTPATIENT)
Facility: HOSPITAL | Age: 77
Discharge: HOME | End: 2017-08-19
Attending: EMERGENCY MEDICINE | Admitting: HOSPITALIST
Payer: COMMERCIAL

## 2017-08-18 DIAGNOSIS — R42 DIZZY: ICD-10-CM

## 2017-08-18 DIAGNOSIS — R42 DIZZINESS: Primary | ICD-10-CM

## 2017-08-18 DIAGNOSIS — E11.9 CONTROLLED TYPE 2 DIABETES MELLITUS WITHOUT COMPLICATION, WITHOUT LONG-TERM CURRENT USE OF INSULIN: Chronic | ICD-10-CM

## 2017-08-18 DIAGNOSIS — I11.9 HYPERTENSIVE HEART DISEASE: ICD-10-CM

## 2017-08-18 DIAGNOSIS — I10 ESSENTIAL HYPERTENSION: Chronic | ICD-10-CM

## 2017-08-18 LAB
ALBUMIN SERPL BCP-MCNC: 3.6 G/DL
ALP SERPL-CCNC: 69 U/L
ALT SERPL W/O P-5'-P-CCNC: 14 U/L
ANION GAP SERPL CALC-SCNC: 11 MMOL/L
AST SERPL-CCNC: 21 U/L
BACTERIA #/AREA URNS HPF: NORMAL /HPF
BASOPHILS # BLD AUTO: 0.02 K/UL
BASOPHILS NFR BLD: 0.4 %
BILIRUB SERPL-MCNC: 0.5 MG/DL
BILIRUB UR QL STRIP: NEGATIVE
BNP SERPL-MCNC: <10 PG/ML
BUN SERPL-MCNC: 15 MG/DL
CALCIUM SERPL-MCNC: 9.8 MG/DL
CHLORIDE SERPL-SCNC: 107 MMOL/L
CLARITY UR: CLEAR
CO2 SERPL-SCNC: 21 MMOL/L
COLOR UR: COLORLESS
CREAT SERPL-MCNC: 0.9 MG/DL
DIFFERENTIAL METHOD: ABNORMAL
EOSINOPHIL # BLD AUTO: 0 K/UL
EOSINOPHIL NFR BLD: 0.2 %
ERYTHROCYTE [DISTWIDTH] IN BLOOD BY AUTOMATED COUNT: 14.6 %
EST. GFR  (AFRICAN AMERICAN): >60 ML/MIN/1.73 M^2
EST. GFR  (NON AFRICAN AMERICAN): >60 ML/MIN/1.73 M^2
GLUCOSE SERPL-MCNC: 130 MG/DL
GLUCOSE UR QL STRIP: NEGATIVE
HCT VFR BLD AUTO: 41.1 %
HGB BLD-MCNC: 13.2 G/DL
HGB UR QL STRIP: ABNORMAL
KETONES UR QL STRIP: NEGATIVE
LEUKOCYTE ESTERASE UR QL STRIP: ABNORMAL
LYMPHOCYTES # BLD AUTO: 1.5 K/UL
LYMPHOCYTES NFR BLD: 27.6 %
MCH RBC QN AUTO: 26.9 PG
MCHC RBC AUTO-ENTMCNC: 32.1 G/DL
MCV RBC AUTO: 84 FL
MICROSCOPIC COMMENT: NORMAL
MONOCYTES # BLD AUTO: 0.4 K/UL
MONOCYTES NFR BLD: 7.4 %
NEUTROPHILS # BLD AUTO: 3.6 K/UL
NEUTROPHILS NFR BLD: 64.4 %
NITRITE UR QL STRIP: NEGATIVE
PH UR STRIP: 6 [PH] (ref 5–8)
PLATELET # BLD AUTO: 238 K/UL
PMV BLD AUTO: 9.8 FL
POCT GLUCOSE: 112 MG/DL (ref 70–110)
POTASSIUM SERPL-SCNC: 4.2 MMOL/L
PROT SERPL-MCNC: 8.6 G/DL
PROT UR QL STRIP: NEGATIVE
RBC # BLD AUTO: 4.91 M/UL
RBC #/AREA URNS HPF: 2 /HPF (ref 0–4)
SODIUM SERPL-SCNC: 139 MMOL/L
SP GR UR STRIP: 1 (ref 1–1.03)
SQUAMOUS #/AREA URNS HPF: 3 /HPF
TROPONIN I SERPL DL<=0.01 NG/ML-MCNC: <0.006 NG/ML
URN SPEC COLLECT METH UR: ABNORMAL
UROBILINOGEN UR STRIP-ACNC: NEGATIVE EU/DL
WBC # BLD AUTO: 5.51 K/UL
WBC #/AREA URNS HPF: 4 /HPF (ref 0–5)

## 2017-08-18 PROCEDURE — 99285 EMERGENCY DEPT VISIT HI MDM: CPT | Mod: 25

## 2017-08-18 PROCEDURE — 93005 ELECTROCARDIOGRAM TRACING: CPT

## 2017-08-18 PROCEDURE — 83880 ASSAY OF NATRIURETIC PEPTIDE: CPT

## 2017-08-18 PROCEDURE — 84484 ASSAY OF TROPONIN QUANT: CPT

## 2017-08-18 PROCEDURE — 80053 COMPREHEN METABOLIC PANEL: CPT

## 2017-08-18 PROCEDURE — 25000003 PHARM REV CODE 250: Performed by: EMERGENCY MEDICINE

## 2017-08-18 PROCEDURE — 96360 HYDRATION IV INFUSION INIT: CPT

## 2017-08-18 PROCEDURE — 82962 GLUCOSE BLOOD TEST: CPT

## 2017-08-18 PROCEDURE — G0378 HOSPITAL OBSERVATION PER HR: HCPCS

## 2017-08-18 PROCEDURE — 85025 COMPLETE CBC W/AUTO DIFF WBC: CPT

## 2017-08-18 PROCEDURE — 96361 HYDRATE IV INFUSION ADD-ON: CPT

## 2017-08-18 PROCEDURE — 81000 URINALYSIS NONAUTO W/SCOPE: CPT

## 2017-08-18 PROCEDURE — 93010 ELECTROCARDIOGRAM REPORT: CPT | Mod: ,,, | Performed by: INTERNAL MEDICINE

## 2017-08-18 RX ORDER — MECLIZINE HYDROCHLORIDE 25 MG/1
25 TABLET ORAL
Status: COMPLETED | OUTPATIENT
Start: 2017-08-18 | End: 2017-08-18

## 2017-08-18 RX ORDER — TRAZODONE HYDROCHLORIDE 50 MG/1
50 TABLET ORAL NIGHTLY
COMMUNITY
End: 2019-02-20

## 2017-08-18 RX ORDER — SODIUM CHLORIDE 9 MG/ML
1000 INJECTION, SOLUTION INTRAVENOUS
Status: COMPLETED | OUTPATIENT
Start: 2017-08-18 | End: 2017-08-19

## 2017-08-18 RX ADMIN — MECLIZINE HYDROCHLORIDE 25 MG: 25 TABLET ORAL at 09:08

## 2017-08-18 RX ADMIN — SODIUM CHLORIDE 1000 ML: 0.9 INJECTION, SOLUTION INTRAVENOUS at 05:08

## 2017-08-18 RX ADMIN — SODIUM CHLORIDE 1000 ML: 0.9 INJECTION, SOLUTION INTRAVENOUS at 09:08

## 2017-08-18 NOTE — ED TRIAGE NOTES
"Pt presents to emergency department complaining of dizziness in her head and weakness when she stands up. Also complains of "puffiness" and swelling to cheeks and under eyes. No new medications. Denies pain. Denies HA, N/V/D, and chest pain.  "

## 2017-08-19 VITALS
TEMPERATURE: 99 F | HEART RATE: 69 BPM | WEIGHT: 170.44 LBS | SYSTOLIC BLOOD PRESSURE: 163 MMHG | BODY MASS INDEX: 27.39 KG/M2 | DIASTOLIC BLOOD PRESSURE: 72 MMHG | OXYGEN SATURATION: 95 % | HEIGHT: 66 IN | RESPIRATION RATE: 17 BRPM

## 2017-08-19 LAB
ALBUMIN SERPL BCP-MCNC: 3.1 G/DL
ALP SERPL-CCNC: 59 U/L
ALT SERPL W/O P-5'-P-CCNC: 13 U/L
ANION GAP SERPL CALC-SCNC: 8 MMOL/L
AST SERPL-CCNC: 16 U/L
BASOPHILS # BLD AUTO: 0.02 K/UL
BASOPHILS NFR BLD: 0.4 %
BILIRUB SERPL-MCNC: 0.7 MG/DL
BUN SERPL-MCNC: 10 MG/DL
CALCIUM SERPL-MCNC: 9.1 MG/DL
CHLORIDE SERPL-SCNC: 112 MMOL/L
CO2 SERPL-SCNC: 24 MMOL/L
CREAT SERPL-MCNC: 0.8 MG/DL
DIASTOLIC DYSFUNCTION: NO
DIFFERENTIAL METHOD: ABNORMAL
EOSINOPHIL # BLD AUTO: 0.1 K/UL
EOSINOPHIL NFR BLD: 1.2 %
ERYTHROCYTE [DISTWIDTH] IN BLOOD BY AUTOMATED COUNT: 14.5 %
EST. GFR  (AFRICAN AMERICAN): >60 ML/MIN/1.73 M^2
EST. GFR  (NON AFRICAN AMERICAN): >60 ML/MIN/1.73 M^2
ESTIMATED PA SYSTOLIC PRESSURE: 30.67
GLOBAL PERICARDIAL EFFUSION: NORMAL
GLUCOSE SERPL-MCNC: 85 MG/DL
HCT VFR BLD AUTO: 36.6 %
HGB BLD-MCNC: 11.9 G/DL
LYMPHOCYTES # BLD AUTO: 2.1 K/UL
LYMPHOCYTES NFR BLD: 36.9 %
MCH RBC QN AUTO: 27.2 PG
MCHC RBC AUTO-ENTMCNC: 32.5 G/DL
MCV RBC AUTO: 84 FL
MONOCYTES # BLD AUTO: 0.5 K/UL
MONOCYTES NFR BLD: 8.5 %
NEUTROPHILS # BLD AUTO: 3 K/UL
NEUTROPHILS NFR BLD: 53 %
PLATELET # BLD AUTO: 228 K/UL
PMV BLD AUTO: 9.4 FL
POTASSIUM SERPL-SCNC: 4.2 MMOL/L
PROT SERPL-MCNC: 7.2 G/DL
RBC # BLD AUTO: 4.38 M/UL
RETIRED EF AND QEF - SEE NOTES: 55 (ref 55–65)
SODIUM SERPL-SCNC: 144 MMOL/L
TRICUSPID VALVE REGURGITATION: NORMAL
TROPONIN I SERPL DL<=0.01 NG/ML-MCNC: 0.01 NG/ML
TROPONIN I SERPL DL<=0.01 NG/ML-MCNC: <0.006 NG/ML
WBC # BLD AUTO: 5.63 K/UL

## 2017-08-19 PROCEDURE — A4216 STERILE WATER/SALINE, 10 ML: HCPCS | Performed by: EMERGENCY MEDICINE

## 2017-08-19 PROCEDURE — 63600175 PHARM REV CODE 636 W HCPCS: Performed by: EMERGENCY MEDICINE

## 2017-08-19 PROCEDURE — 84484 ASSAY OF TROPONIN QUANT: CPT

## 2017-08-19 PROCEDURE — G0378 HOSPITAL OBSERVATION PER HR: HCPCS

## 2017-08-19 PROCEDURE — 93306 TTE W/DOPPLER COMPLETE: CPT

## 2017-08-19 PROCEDURE — 85025 COMPLETE CBC W/AUTO DIFF WBC: CPT

## 2017-08-19 PROCEDURE — 36415 COLL VENOUS BLD VENIPUNCTURE: CPT

## 2017-08-19 PROCEDURE — 80053 COMPREHEN METABOLIC PANEL: CPT

## 2017-08-19 PROCEDURE — 25000003 PHARM REV CODE 250: Performed by: EMERGENCY MEDICINE

## 2017-08-19 PROCEDURE — 93306 TTE W/DOPPLER COMPLETE: CPT | Mod: 26,,, | Performed by: INTERNAL MEDICINE

## 2017-08-19 RX ORDER — FAMOTIDINE 20 MG/1
20 TABLET, FILM COATED ORAL DAILY
Status: DISCONTINUED | OUTPATIENT
Start: 2017-08-19 | End: 2017-08-19 | Stop reason: HOSPADM

## 2017-08-19 RX ORDER — HYDROCODONE BITARTRATE AND ACETAMINOPHEN 10; 325 MG/1; MG/1
1 TABLET ORAL EVERY 4 HOURS PRN
Status: DISCONTINUED | OUTPATIENT
Start: 2017-08-19 | End: 2017-08-19 | Stop reason: HOSPADM

## 2017-08-19 RX ORDER — ONDANSETRON 2 MG/ML
4 INJECTION INTRAMUSCULAR; INTRAVENOUS EVERY 12 HOURS PRN
Status: DISCONTINUED | OUTPATIENT
Start: 2017-08-19 | End: 2017-08-19 | Stop reason: HOSPADM

## 2017-08-19 RX ORDER — AMOXICILLIN 250 MG
1 CAPSULE ORAL 2 TIMES DAILY
Status: DISCONTINUED | OUTPATIENT
Start: 2017-08-19 | End: 2017-08-19 | Stop reason: HOSPADM

## 2017-08-19 RX ORDER — SODIUM CHLORIDE 0.9 % (FLUSH) 0.9 %
3 SYRINGE (ML) INJECTION EVERY 8 HOURS
Status: DISCONTINUED | OUTPATIENT
Start: 2017-08-19 | End: 2017-08-19 | Stop reason: HOSPADM

## 2017-08-19 RX ORDER — HEPARIN SODIUM 5000 [USP'U]/ML
5000 INJECTION, SOLUTION INTRAVENOUS; SUBCUTANEOUS EVERY 8 HOURS
Status: DISCONTINUED | OUTPATIENT
Start: 2017-08-19 | End: 2017-08-19 | Stop reason: HOSPADM

## 2017-08-19 RX ORDER — HYDROCODONE BITARTRATE AND ACETAMINOPHEN 5; 325 MG/1; MG/1
1 TABLET ORAL EVERY 4 HOURS PRN
Status: DISCONTINUED | OUTPATIENT
Start: 2017-08-19 | End: 2017-08-19 | Stop reason: HOSPADM

## 2017-08-19 RX ADMIN — HEPARIN SODIUM 5000 UNITS: 5000 INJECTION, SOLUTION INTRAVENOUS; SUBCUTANEOUS at 05:08

## 2017-08-19 RX ADMIN — SODIUM CHLORIDE, PRESERVATIVE FREE 3 ML: 5 INJECTION INTRAVENOUS at 12:08

## 2017-08-19 RX ADMIN — DOCUSATE SODIUM AND SENNOSIDES 1 TABLET: 8.6; 5 TABLET, FILM COATED ORAL at 01:08

## 2017-08-19 RX ADMIN — HEPARIN SODIUM 5000 UNITS: 5000 INJECTION, SOLUTION INTRAVENOUS; SUBCUTANEOUS at 12:08

## 2017-08-19 RX ADMIN — SODIUM CHLORIDE, PRESERVATIVE FREE 3 ML: 5 INJECTION INTRAVENOUS at 05:08

## 2017-08-19 RX ADMIN — FAMOTIDINE 20 MG: 20 TABLET, FILM COATED ORAL at 10:08

## 2017-08-19 NOTE — SUBJECTIVE & OBJECTIVE
Past Medical History:   Diagnosis Date    Diabetes mellitus     Hypertension        Past Surgical History:   Procedure Laterality Date    COLONOSCOPY N/A 3/31/2017    Procedure: COLONOSCOPY;  Surgeon: Apolinar Matute MD;  Location: [facility];  Service: Endoscopy;  Laterality: N/A;    OVARY SURGERY         Review of patient's allergies indicates:  No Known Allergies    No current facility-administered medications on file prior to encounter.      Current Outpatient Prescriptions on File Prior to Encounter   Medication Sig    metformin (GLUCOPHAGE) 1000 MG tablet Take 1,000 mg by mouth 2 (two) times daily with meals.    valsartan (DIOVAN) 320 MG tablet Take 0.5 tablets (160 mg total) by mouth once daily. Hold for BP less than 130/80    ergocalciferol (ERGOCALCIFEROL) 50,000 unit Cap Take 1 capsule (50,000 Units total) by mouth every 7 days.     Family History     None        Social History Main Topics    Smoking status: Never Smoker    Smokeless tobacco: Never Used    Alcohol use No    Drug use: No    Sexual activity: No     Review of Systems   Constitutional: Negative for chills and fever.   HENT: Negative for congestion and sore throat.    Respiratory: Negative for cough and shortness of breath.    Cardiovascular: Negative for chest pain and palpitations.   Gastrointestinal: Negative for abdominal pain and nausea.   Endocrine: Negative for cold intolerance and heat intolerance.   Genitourinary: Negative for dysuria and frequency.   Musculoskeletal: Negative for arthralgias and myalgias.   Skin: Negative for color change and rash.   Neurological: Positive for dizziness and light-headedness. Negative for headaches.   Psychiatric/Behavioral: Negative for behavioral problems and confusion.     Objective:     Vital Signs (Most Recent):  Temp: 98.7 °F (37.1 °C) (08/19/17 1100)  Pulse: 69 (08/19/17 1100)  Resp: 17 (08/19/17 1100)  BP: (!) 163/72 (08/19/17 1100)  SpO2: 95 % (08/19/17 1100) Vital Signs (24h  Range):  Temp:  [97.7 °F (36.5 °C)-98.9 °F (37.2 °C)] 98.7 °F (37.1 °C)  Pulse:  [69-99] 69  Resp:  [16-17] 17  SpO2:  [94 %-100 %] 95 %  BP: (141-201)/(66-95) 163/72     Weight: 77.3 kg (170 lb 6.7 oz)  Body mass index is 27.51 kg/m².    Physical Exam   Constitutional: She is oriented to person, place, and time. She appears well-developed and well-nourished. No distress.   HENT:   Head: Normocephalic and atraumatic.   Eyes: EOM are normal. Pupils are equal, round, and reactive to light.   Neck: Normal range of motion. Neck supple.   Cardiovascular: Normal rate and regular rhythm.    No murmur heard.  Pulmonary/Chest: Effort normal and breath sounds normal.   Abdominal: Soft. Bowel sounds are normal. There is no tenderness.   Musculoskeletal: Normal range of motion.   Neurological: She is alert and oriented to person, place, and time.   Skin: Skin is warm and dry. No rash noted.   Psychiatric: She has a normal mood and affect. Her behavior is normal.        Significant Labs:   CBC:   Recent Labs  Lab 08/18/17 1736 08/19/17  0620   WBC 5.51 5.63   HGB 13.2 11.9*   HCT 41.1 36.6*    228     CMP:   Recent Labs  Lab 08/18/17 1736 08/19/17  0620    144   K 4.2 4.2    112*   CO2 21* 24   * 85   BUN 15 10   CREATININE 0.9 0.8   CALCIUM 9.8 9.1   PROT 8.6* 7.2   ALBUMIN 3.6 3.1*   BILITOT 0.5 0.7   ALKPHOS 69 59   AST 21 16   ALT 14 13   ANIONGAP 11 8   EGFRNONAA >60 >60     Troponin:   Recent Labs  Lab 08/18/17  1736 08/19/17  0104 08/19/17  0620   TROPONINI <0.006 <0.006 0.014     Urine Studies:   Recent Labs  Lab 08/18/17  1911   COLORU Colorless*   APPEARANCEUA Clear   PHUR 6.0   SPECGRAV 1.000*   PROTEINUA Negative   GLUCUA Negative   KETONESU Negative   BILIRUBINUA Negative   OCCULTUA 1+*   NITRITE Negative   UROBILINOGEN Negative   LEUKOCYTESUR 3+*   RBCUA 2   WBCUA 4   BACTERIA Occasional   SQUAMEPITHEL 3       Significant Imaging:  Imaging Results          CT Head Without Contrast (Final  result)  Result time 08/18/17 17:58:57    Final result by Barbara Aceves MD (08/18/17 17:58:57)                 Impression:        1.  No acute large vascular territory infarct or intracranial hemorrhage identified. Further evaluation/followup as warranted.    2.  Senescent changes as above.      Electronically signed by: BARBARA ACEVES MD, MD  Date:     08/18/17  Time:    17:58              Narrative:    COMPARISON: Head CT 6/29/16 and MRI brain 6/30/16    FINDINGS: No evidence of hemorrhage, mass, midline shift or acute major vascular territory infarct.  Gray white interface appears intact.  There is age appropriate  generalized cerebral atrophy.  Grossly stable mild degree of patchy low attenuation changes throughout the subcortical and periventricular white matter, nonspecific but can be seen with chronic small vessel ischemic disease.  No definite new focal areas of abnormal parenchymal attenuation. The ventricles are midline without distortion by mass effect.  No extra-axial hemorrhage or mass. Skull base  vascular and bilateral basal ganglia calcifications are noted.     The extracranial structures are within normal limits.  Calvarium is intact.  Visualized paranasal sinuses are clear.  Mastoid air cells are clear.                             X-Ray Chest AP Portable (Final result)  Result time 08/18/17 17:58:26    Final result by Brook Palomares MD (08/18/17 17:58:26)                 Impression:      No acute cardiopulmonary process identified.      Electronically signed by: BROOK PALOMARES MD  Date:     08/18/17  Time:    17:58              Narrative:    Chest AP single view.  Comparison: 3/30/17.    Mediastinal structures are midline.  Cardiac silhouette is normal in size.  Lungs are symmetrically expanded.  No evidence of focal consolidative process, pneumothorax, or significant effusion.  Bones show DJD.  No free air visualized beneath the diaphragm.

## 2017-08-19 NOTE — ASSESSMENT & PLAN NOTE
A1c 6.4. Well controlled on metformin only at home. Will hold while in hospital. Will cover with SSI PRN, POCT checks, hypoglycemia protocol. Diabetic diet.

## 2017-08-19 NOTE — ASSESSMENT & PLAN NOTE
Patient with reported dizziness--only with walking. No change with position. Reports no change lying to sitting or sit to stand only when ambulating. Only new medication is ambien. Appears euvolemic and reports drinking plenty of water. No hypoglycemic episodes at home per patient. BP well controlled. Etiology unclear--possibly orthostatic (will check) vs cardiac cause vs medication side effect.   -cardiac monitoring  -serial troponins  -orthostatics  -D/C ambien

## 2017-08-19 NOTE — PLAN OF CARE
Problem: Diabetes, Type 2 (Adult)  Intervention: Support/Optimize Psychosocial Response to Condition   08/19/17 0424   Coping/Psychosocial Interventions   Supportive Measures active listening utilized;decision-making supported;self-responsibility promoted     Intervention: Optimize Glycemic Control   08/19/17 0424   Nutrition Interventions   Glycemic Management blood glucose monitoring       Goal: Signs and Symptoms of Listed Potential Problems Will be Absent, Minimized or Managed (Diabetes, Type 2)  Signs and symptoms of listed potential problems will be absent, minimized or managed by discharge/transition of care (reference Diabetes, Type 2 (Adult) CPG).    08/19/17 0424   Diabetes, Type 2   Problems Assessed (Type 2 Diabetes) all   Problems Present (Type 2 Diabetes) none       Problem: Fall Risk (Adult)  Intervention: Monitor/Assist with Self Care   08/19/17 0109   Activity   Activity Assistance Provided assistance, stand-by     Intervention: Patient Rounds   08/19/17 0109   Safety Interventions   Patient Rounds bed in low position;bed wheels locked;call light in reach;clutter free environment maintained;ID band on;placement of personal items at bedside;toileting offered;visualized patient       Goal: Absence of Falls  Patient will demonstrate the desired outcomes by discharge/transition of care.    08/19/17 0424   Fall Risk (Adult)   Absence of Falls making progress toward outcome       Problem: Patient Care Overview  Goal: Plan of Care Review   08/19/17 0424   Coping/Psychosocial   Plan Of Care Reviewed With patient       Problem: Activity Intolerance (Adult)  Goal: Activity Tolerance  Patient will demonstrate the desired outcomes by discharge/transition of care.    08/19/17 0424   Activity Intolerance (Adult)   Activity Tolerance making progress toward outcome     Goal: Effective Energy Conservation Techniques  Patient will demonstrate the desired outcomes by discharge/transition of care.    08/19/17 0424    Activity Intolerance (Adult)   Effective Energy Conservation Techniques making progress toward outcome

## 2017-08-19 NOTE — PLAN OF CARE
08/19/17 1503   Final Note   Assessment Type Final Discharge Note   Discharge Disposition Home   Discharge planning education complete? Yes   What phone number can be called within the next 1-3 days to see how you are doing after discharge? ([phone] )   Hospital Follow Up  Appt(s) scheduled? No  (Unable to schedule appointments on weekend)   Discharge plans and expectations educations in teach back method with documentation complete? Yes   [facility]a   [facility] n/a   Referral to Outpatient Case Management complete? n/a   Referral to / orders for Home Health Complete? n/a   30 day supply of medicines given at discharge, if documented non-compliance / non-adherence? n/a   Any social issues identified prior to discharge? No   Did you assess the readiness or willingness of the family or caregiver to support self management of care? Yes   Right Care Referral Info   Post Acute Recommendation No Care

## 2017-08-19 NOTE — PLAN OF CARE
08/19/17 0901   Discharge Assessment   Assessment Type Discharge Planning Assessment   Confirmed/corrected address and phone number on facesheet? Yes   Assessment information obtained from? Patient   Communicated expected length of stay with patient/caregiver no   Type of Healthcare Directive Received Durable power of  for health care   If Healthcare Directive is received, is it scanned into Epic? no (comment)   Prior to hospitilization cognitive status: Alert/Oriented   Prior to hospitalization functional status: Independent;Assistive Equipment  (Walker)   Current cognitive status: Alert/Oriented   Current Functional Status: Independent  (Walker)   Arrived From home or self-care   Lives With alone  (Daughters do come in and assist if/as needed)   Able to Return to Prior Arrangements yes   Is patient able to care for self after discharge? Yes   How many people do you have in your home that can help with your care after discharge? 1   Who are your caregiver(s) and their phone number(s)? Henrik: [phone]   Patient's perception of discharge disposition home or selfcare   Readmission Within The Last 30 Days no previous admission in last 30 days   Patient currently being followed by outpatient case management? No   Patient currently receives home health services? No   Does the patient currently use HME? Yes   Name and contact number for HME provider: Unknown   Patient currently receives private duty nursing? No   Patient currently receives any other outside agency services? No   Equipment Currently Used at Home walker, rolling   Do you have any problems affording any of your prescribed medications? No   Is the patient taking medications as prescribed? yes   Do you have any financial concerns preventing you from receiving the healthcare you need? No   Does the patient have transportation to healthcare appointments? Yes   Transportation Available family or friend will provide   On Dialysis? No   Does  the patient receive services at the [facility]mad[facility] Clinic? No   Are there any open cases? No   Discharge Plan A Home  (Desires BS upon discharge)   Discharge Plan B Other  (TBD)   Patient/Family In Agreement With Plan yes     Pharmacy: [facility]    Patient is along at home and independent; daughters are available to assist as/if needed; they do transport     Warning signs/symptoms information reviewed with and provided to patient

## 2017-08-19 NOTE — ED NOTES
Received report from Michael ART. 1st contact with pt. Pt AAO x 3, REU, skin warm, dry and intact. Side rails up x 2, bed in low position, wheels locked. Call bell within reach. Pt c/o swelling around eyes and states she feels a little dizzy. Pt has been updated w/ care will continue to monitor

## 2017-08-19 NOTE — H&P
"[facility] Medicine  History & Physical    Patient Name: Radha Morales  MRN: [id number]  Admission Date: 8/18/2017  Attending Physician: Zee Mcdonough MD   Primary Care Provider: Diego Medel Jr, MD         Patient information was obtained from patient and ER records.     Subjective:     Principal Problem:Dizziness    Chief Complaint:   Chief Complaint   Patient presents with    Dizziness     "I don't know if it's my sugar or my pressure.  I don't feel good".  states dizziness since yesterday.        HPI: Patient is 77 yo female with HTN and NIDDM who presents to ED with complaint of dizziness xfew months duration. Patient reports dizziness occurs only when she gets up and walks. She is not dizzy when sitting or lying. She has no associated symptoms and denies fever/chills, HA, SOB, CP, abdominal pain, N/V, dysuria, or recent trauma. She currently takes metformin and one blood pressure medication every morning. States BG runs from 100-130. She does endorse beginning ambien for sleep around the same time symptoms arose. She has no history of cardiovascular disease or arrhythmia. On presentation patient with negative initial troponin and non ischemic EKG. Unremarkable CXR and head CT. Grossly normal labs and vitals. Placed in observation for further evaluation.     Past Medical History:   Diagnosis Date    Diabetes mellitus     Hypertension        Past Surgical History:   Procedure Laterality Date    COLONOSCOPY N/A 3/31/2017    Procedure: COLONOSCOPY;  Surgeon: Apolinar Matute MD;  Location: [facility];  Service: Endoscopy;  Laterality: N/A;    OVARY SURGERY         Review of patient's allergies indicates:  No Known Allergies    No current facility-administered medications on file prior to encounter.      Current Outpatient Prescriptions on File Prior to Encounter   Medication Sig    metformin (GLUCOPHAGE) 1000 MG tablet Take 1,000 mg by mouth 2 (two) times daily with " meals.    valsartan (DIOVAN) 320 MG tablet Take 0.5 tablets (160 mg total) by mouth once daily. Hold for BP less than 130/80    ergocalciferol (ERGOCALCIFEROL) 50,000 unit Cap Take 1 capsule (50,000 Units total) by mouth every 7 days.     Family History     None        Social History Main Topics    Smoking status: Never Smoker    Smokeless tobacco: Never Used    Alcohol use No    Drug use: No    Sexual activity: No     Review of Systems   Constitutional: Negative for chills and fever.   HENT: Negative for congestion and sore throat.    Respiratory: Negative for cough and shortness of breath.    Cardiovascular: Negative for chest pain and palpitations.   Gastrointestinal: Negative for abdominal pain and nausea.   Endocrine: Negative for cold intolerance and heat intolerance.   Genitourinary: Negative for dysuria and frequency.   Musculoskeletal: Negative for arthralgias and myalgias.   Skin: Negative for color change and rash.   Neurological: Positive for dizziness and light-headedness. Negative for headaches.   Psychiatric/Behavioral: Negative for behavioral problems and confusion.     Objective:     Vital Signs (Most Recent):  Temp: 98.7 °F (37.1 °C) (08/19/17 1100)  Pulse: 69 (08/19/17 1100)  Resp: 17 (08/19/17 1100)  BP: (!) 163/72 (08/19/17 1100)  SpO2: 95 % (08/19/17 1100) Vital Signs (24h Range):  Temp:  [97.7 °F (36.5 °C)-98.9 °F (37.2 °C)] 98.7 °F (37.1 °C)  Pulse:  [69-99] 69  Resp:  [16-17] 17  SpO2:  [94 %-100 %] 95 %  BP: (141-201)/(66-95) 163/72     Weight: 77.3 kg (170 lb 6.7 oz)  Body mass index is 27.51 kg/m².    Physical Exam   Constitutional: She is oriented to person, place, and time. She appears well-developed and well-nourished. No distress.   HENT:   Head: Normocephalic and atraumatic.   Eyes: EOM are normal. Pupils are equal, round, and reactive to light.   Neck: Normal range of motion. Neck supple.   Cardiovascular: Normal rate and regular rhythm.    No murmur heard.  Pulmonary/Chest:  Effort normal and breath sounds normal.   Abdominal: Soft. Bowel sounds are normal. There is no tenderness.   Musculoskeletal: Normal range of motion.   Neurological: She is alert and oriented to person, place, and time.   Skin: Skin is warm and dry. No rash noted.   Psychiatric: She has a normal mood and affect. Her behavior is normal.        Significant Labs:   CBC:   Recent Labs  Lab 08/18/17  1736 08/19/17  0620   WBC 5.51 5.63   HGB 13.2 11.9*   HCT 41.1 36.6*    228     CMP:   Recent Labs  Lab 08/18/17  1736 08/19/17  0620    144   K 4.2 4.2    112*   CO2 21* 24   * 85   BUN 15 10   CREATININE 0.9 0.8   CALCIUM 9.8 9.1   PROT 8.6* 7.2   ALBUMIN 3.6 3.1*   BILITOT 0.5 0.7   ALKPHOS 69 59   AST 21 16   ALT 14 13   ANIONGAP 11 8   EGFRNONAA >60 >60     Troponin:   Recent Labs  Lab 08/18/17  1736 08/19/17  0104 08/19/17  0620   TROPONINI <0.006 <0.006 0.014     Urine Studies:   Recent Labs  Lab 08/18/17  1911   COLORU Colorless*   APPEARANCEUA Clear   PHUR 6.0   SPECGRAV 1.000*   PROTEINUA Negative   GLUCUA Negative   KETONESU Negative   BILIRUBINUA Negative   OCCULTUA 1+*   NITRITE Negative   UROBILINOGEN Negative   LEUKOCYTESUR 3+*   RBCUA 2   WBCUA 4   BACTERIA Occasional   SQUAMEPITHEL 3       Significant Imaging:  Imaging Results          CT Head Without Contrast (Final result)  Result time 08/18/17 17:58:57    Final result by Barbara Aceves MD (08/18/17 17:58:57)                 Impression:        1.  No acute large vascular territory infarct or intracranial hemorrhage identified. Further evaluation/followup as warranted.    2.  Senescent changes as above.      Electronically signed by: BARBARA ACEVES MD, MD  Date:     08/18/17  Time:    17:58              Narrative:    COMPARISON: Head CT 6/29/16 and MRI brain 6/30/16    FINDINGS: No evidence of hemorrhage, mass, midline shift or acute major vascular territory infarct.  Gray white interface appears intact.  There is age appropriate   generalized cerebral atrophy.  Grossly stable mild degree of patchy low attenuation changes throughout the subcortical and periventricular white matter, nonspecific but can be seen with chronic small vessel ischemic disease.  No definite new focal areas of abnormal parenchymal attenuation. The ventricles are midline without distortion by mass effect.  No extra-axial hemorrhage or mass. Skull base  vascular and bilateral basal ganglia calcifications are noted.     The extracranial structures are within normal limits.  Calvarium is intact.  Visualized paranasal sinuses are clear.  Mastoid air cells are clear.                             X-Ray Chest AP Portable (Final result)  Result time 08/18/17 17:58:26    Final result by Brook Palomares MD (08/18/17 17:58:26)                 Impression:      No acute cardiopulmonary process identified.      Electronically signed by: BROOK PALOMARES MD  Date:     08/18/17  Time:    17:58              Narrative:    Chest AP single view.  Comparison: 3/30/17.    Mediastinal structures are midline.  Cardiac silhouette is normal in size.  Lungs are symmetrically expanded.  No evidence of focal consolidative process, pneumothorax, or significant effusion.  Bones show DJD.  No free air visualized beneath the diaphragm.                                Assessment/Plan:     * Dizziness    Patient with reported dizziness--only with walking. No change with position. Reports no change lying to sitting or sit to stand only when ambulating. Only new medication is ambien. Appears euvolemic and reports drinking plenty of water. No hypoglycemic episodes at home per patient. BP well controlled. Etiology unclear--possibly orthostatic (will check) vs cardiac cause vs medication side effect.   -cardiac monitoring  -serial troponins  -orthostatics  -D/C ambien          Type 2 diabetes mellitus, controlled    A1c 6.4. Well controlled on metformin only at home. Will hold while in hospital. Will cover with  SSI PRN, POCT checks, hypoglycemia protocol. Diabetic diet.             Essential hypertension    Patient BP well controlled on presentation. Will continue home anti hypertensive regimen and monitor.               VTE Risk Mitigation         Ordered     heparin (porcine) injection 5,000 Units  Every 8 hours     Route:  Subcutaneous        08/19/17 0052     Medium Risk of VTE  Once      08/19/17 0052             Skylar Aleman PA-C  Hospitalist-Department of Hospital Medicine  [address] [place] [address]  Office [phone] Pager [phone]

## 2017-08-19 NOTE — ED PROVIDER NOTES
"Encounter Date: 8/18/2017    SCRIBE #1 NOTE: I, Estuardo Ricardo, am scribing for, and in the presence of,  Matt Maria C. I have scribed the following portions of the note - Other sections scribed: HPI, ROS, PE, EKG, MDM.       History     Chief Complaint   Patient presents with    Dizziness     "I don't know if it's my sugar or my pressure.  I don't feel good".  states dizziness since yesterday.     CC: Lightheadedness     HPI: This 76 y.o. Female with PMHx HTM, DM, and PSHx ovary surgery and colonoscopy presents to the ED c/o lightheadedness for the past "couple of days". Pt reports exacerbation when standing and walking, and reports alleviation ("goes away") when sitting or laying down. Pt denies exacerbation when moving her head. Pt reports associated chills, shaking, and bilateral periorbital swelling. Pt denies cough, fever, change in appetite, vomiting, diarrhea, syncope, and congestion. Pt reports similar symptoms in the past. Pt reports taking Zolpidem for the past x1 month to help sleep. Pt reports taking DM and HTN medications daily as well. Pt says she has been drinking water all day ("half a 24-pack of bottles"/day) and denies going out in the heat often. No prior tx's reported.       The history is provided by the patient. No  was used.     Review of patient's allergies indicates:  No Known Allergies  Past Medical History:   Diagnosis Date    Diabetes mellitus     Hypertension      Past Surgical History:   Procedure Laterality Date    COLONOSCOPY N/A 3/31/2017    Procedure: COLONOSCOPY;  Surgeon: Apolinar Matute MD;  Location[facility];  Service: Endoscopy;  Laterality: N/A;    OVARY SURGERY       History reviewed. No pertinent family history.  Social History   Substance Use Topics    Smoking status: Never Smoker    Smokeless tobacco: Never Used    Alcohol use No     Review of Systems   Constitutional: Positive for chills. Negative for appetite change and fever.        (+) " ""shaking"     HENT: Negative for congestion and sore throat.    Eyes:        (+) bilateral periorbital swelling   Respiratory: Negative for cough and shortness of breath.    Cardiovascular: Negative for chest pain.   Gastrointestinal: Negative for diarrhea, nausea and vomiting.   Genitourinary: Negative for dysuria.   Musculoskeletal: Negative for back pain.   Skin: Negative for rash.   Neurological: Positive for light-headedness. Negative for syncope and weakness.   Hematological: Does not bruise/bleed easily.       Physical Exam     Initial Vitals [08/18/17 1553]   BP Pulse Resp Temp SpO2   (!) 148/66 81 16 98.9 °F (37.2 °C) 98 %      MAP       93.33         Physical Exam    Nursing note and vitals reviewed.  Constitutional: She appears well-developed and well-nourished.  Non-toxic appearance. She does not appear ill.   HENT:   Head: Normocephalic and atraumatic.   Nose: Nose normal.   Eyes: EOM and lids are normal.   Neck: Neck supple.   Cardiovascular: Normal rate and regular rhythm.   Pulmonary/Chest: Effort normal and breath sounds normal. No respiratory distress.   Abdominal: Soft. Normal appearance and bowel sounds are normal. She exhibits no distension. There is no tenderness.   Musculoskeletal: Normal range of motion.   Neurological: She is alert.   Skin: Skin is warm and dry.   Psychiatric: She has a normal mood and affect. Her behavior is normal. Thought content normal.         ED Course   Procedures  Labs Reviewed   CBC W/ AUTO DIFFERENTIAL - Abnormal; Notable for the following:        Result Value    MCH 26.9 (*)     RDW 14.6 (*)     All other components within normal limits   COMPREHENSIVE METABOLIC PANEL - Abnormal; Notable for the following:     CO2 21 (*)     Glucose 130 (*)     Total Protein 8.6 (*)     All other components within normal limits   URINALYSIS - Abnormal; Notable for the following:     Color, UA Colorless (*)     Specific [place], UA 1.000 (*)     Occult Blood UA 1+ (*)     " Leukocytes, UA 3+ (*)     All other components within normal limits   POCT GLUCOSE - Abnormal; Notable for the following:     POCT Glucose 112 (*)     All other components within normal limits   TROPONIN I   B-TYPE NATRIURETIC PEPTIDE   URINALYSIS MICROSCOPIC     EKG Readings: (Independently Interpreted)   Initial Reading: No STEMI. Rhythm: Normal Sinus Rhythm. Heart Rate: 81. ST Segments: Normal ST Segments. T Waves: Normal.          Medical Decision Making:   History:   Old Medical Records: I decided to obtain old medical records.  Clinical Tests:   Lab Tests: Ordered and Reviewed  Radiological Study: Ordered and Reviewed  Medical Tests: Ordered and Reviewed  ED Management:  THIS IS AN EMERGENT EVALUATION.  PT COMPLAINS OF DIZZINESS.  MY DDX INCLUDES ICH, STROKE, MASS LESION, CENTRAL VERTIGO, BENIGN POSITIONAL VERTIGO, INFECTION, DEHYDRATION, ELECTROLYTE ABNORMALITIES, AND ORTHOSTATIC HYPOTENSION.    ORTHOSTATIC VS WERE NORMAL  LABS ARE NEGATIVE FOR DEHYDRATION, ELECTROLYTE ABNORMALITIES.  CXR IS NEGATIVE FOR PNA.  There are no cardiac problems evident at this time.  CT IS NEGATIVE FOR ICH, STROKE, MASS LESION.    THE PT RECEIVED:  Meclizine and IVF    Symptoms still present.  Will admit for further evaluation and management.                Scribe Attestation:   Scribe #1: I performed the above scribed service and the documentation accurately describes the services I performed. I attest to the accuracy of the note.    Attending Attestation:           Physician Attestation for Scribe:  Physician Attestation Statement for Scribe #1: I, Matt Lombardi MD, reviewed documentation, as scribed by Estuardo Ricardo in my presence, and it is both accurate and complete.                 ED Course     Clinical Impression:   The primary encounter diagnosis was Dizziness. Diagnoses of Dizzy, Hypertensive heart disease, Essential hypertension, and Controlled type 2 diabetes mellitus without complication, without long-term current use of  insulin were also pertinent to this visit.    Disposition:   Disposition: Placed in Observation  Condition: Stable                        Matt Lombardi MD  08/22/17 021

## 2017-08-19 NOTE — ED NOTES
Pt ambulated around room with steady gait, able to tolerate it. Pt states she feels less dizzy compared to when she came in. But is concern of her facial swelling

## 2017-08-19 NOTE — ED NOTES
"Pt ambulated around room. Gait steady. Pt states "The medicine helped a little bit but not much, I still feel a little dizzy"  "

## 2017-08-19 NOTE — PROGRESS NOTES
Patient arrived to unit via wheelchair. AAOx3. Breathing is even and unlabored. No apparent distress noted. Tele monitoring initiated. Shift assessment initiated.

## 2017-08-19 NOTE — ASSESSMENT & PLAN NOTE
Patient BP well controlled on presentation. Will continue home anti hypertensive regimen and monitor.

## 2017-08-20 LAB — POCT GLUCOSE: 153 MG/DL (ref 70–110)

## 2017-08-20 NOTE — DISCHARGE SUMMARY
[facility] Medicine  Discharge Summary      Patient Name: Radha Morales  MRN: [id number]  Admission Date: 8/18/2017  Hospital Length of Stay: 0 days  Discharge Date and Time: 8/19/2017  3:41 PM  Attending Physician: No att. providers found   Discharging Provider: Skylar Aleman PA-C  Primary Care Provider: Diego Medel Jr, MD      HPI:   Patient is 77 yo female with HTN and NIDDM who presents to ED with complaint of dizziness xfew months duration. Patient reports dizziness occurs only when she gets up and walks. She is not dizzy when sitting or lying. She has no associated symptoms and denies fever/chills, HA, SOB, CP, abdominal pain, N/V, dysuria, or recent trauma. She currently takes metformin and one blood pressure medication every morning. States BG runs from 100-130. She does endorse beginning ambien for sleep around the same time symptoms arose. She has no history of cardiovascular disease or arrhythmia. On presentation patient with negative initial troponin and non ischemic EKG. Unremarkable CXR and head CT. Grossly normal labs and vitals. Placed in observation for further evaluation.     * No surgery found *      Indwelling Lines/Drains at time of discharge:   Lines/Drains/Airways          No matching active lines, drains, or airways        Hospital Course:   Patient with reported dizziness--only with walking. No change with position. Reports no change lying to sitting or sit to stand only when ambulating. Negative orthostatics. Only new medication is ambien. Appears euvolemic and reports drinking plenty of water. No hypoglycemic episodes at home or in hospital. Normal Head CT. Serial troponins negative. No arrhythmia noted on telemetry. Unremarkable CXR. Negative UA. Ruled out for any emergent causes for dizziness. Advised to discontinue ambien as this is only new agent which could be etiology. Patient reports recently secondary to insurance coverage change she filled  new Rx for trazadone instead but has not tried yet thus urged to d/c ambien and try trazadone to see if dizziness resolves. She should follow up with PCP.      Consults:     Significant Diagnostic Studies: Labs:   CMP   Recent Labs  Lab 08/18/17  1736 08/19/17  0620    144   K 4.2 4.2    112*   CO2 21* 24   * 85   BUN 15 10   CREATININE 0.9 0.8   CALCIUM 9.8 9.1   PROT 8.6* 7.2   ALBUMIN 3.6 3.1*   BILITOT 0.5 0.7   ALKPHOS 69 59   AST 21 16   ALT 14 13   ANIONGAP 11 8   ESTGFRAFRICA >60 >60   EGFRNONAA >60 >60   , CBC   Recent Labs  Lab 08/18/17  1736 08/19/17  0620   WBC 5.51 5.63   HGB 13.2 11.9*   HCT 41.1 36.6*    228    and Troponin   Recent Labs  Lab 08/19/17  0620   TROPONINI 0.014     Cardiac Graphics: Echocardiogram:   2D echo with color flow doppler:   Results for orders placed or performed during the hospital encounter of 08/18/17   2D echo with color flow doppler   Result Value Ref Range    EF 55 55 - 65    Diastolic Dysfunction No     Est. PA Systolic Pressure 30.67     Pericardial Effusion NONE     Tricuspid Valve Regurgitation TRIVIAL TO MILD        Pending Diagnostic Studies:     None        Final Active Diagnoses:    Diagnosis Date Noted POA    Essential hypertension [I10] 06/29/2016 Yes     Chronic    Type 2 diabetes mellitus, controlled [E11.9] 06/29/2016 Yes     Chronic      Problems Resolved During this Admission:    Diagnosis Date Noted Date Resolved POA    PRINCIPAL PROBLEM:  Dizziness [R42] 06/29/2016 08/19/2017 Yes      No new Assessment & Plan notes have been filed under this hospital service since the last note was generated.  Service: Hospital Medicine      Discharged Condition: good    Disposition: Home or Self Care    Follow Up:  Follow-up Information     Diego Medel Jr, MD.    Specialty:  Family Medicine  Contact information:  [address] [facility] [address] [phone]                 Patient Instructions:     Diet  Diabetic 1800 Calories     Activity as tolerated       Medications:  Reconciled Home Medications:   Discharge Medication List as of 8/19/2017  2:21 PM      CONTINUE these medications which have NOT CHANGED    Details   metformin (GLUCOPHAGE) 1000 MG tablet Take 1,000 mg by mouth 2 (two) times daily with meals., Until Discontinued, Historical Med      trazodone (DESYREL) 50 MG tablet Take 50 mg by mouth every evening., Historical Med      valsartan (DIOVAN) 320 MG tablet Take 0.5 tablets (160 mg total) by mouth once daily. Hold for BP less than 130/80, Starting Sat 4/1/2017, Until Fri 8/18/2017, Print      ergocalciferol (ERGOCALCIFEROL) 50,000 unit Cap Take 1 capsule (50,000 Units total) by mouth every 7 days., Starting 7/2/2016, Until Discontinued, Print           Time spent on the discharge of patient: less than thirty minutes    Skylar Aleman PA-C  Hospitalist-Department of Hospital Medicine  [place] [address] [place] [address]  Office [phone] Pager [phone]

## 2017-08-20 NOTE — HOSPITAL COURSE
Patient with reported dizziness--only with walking. No change with position. Reports no change lying to sitting or sit to stand only when ambulating. Negative orthostatics. Only new medication is ambien. Appears euvolemic and reports drinking plenty of water. No hypoglycemic episodes at home or in hospital. Normal Head CT. Serial troponins negative. No arrhythmia noted on telemetry. Unremarkable CXR. Negative UA. Ruled out for any emergent causes for dizziness. Advised to discontinue ambien as this is only new agent which could be etiology. Patient reports recently secondary to insurance coverage change she filled new Rx for trazadone instead but has not tried yet thus urged to d/c ambien and try trazadone to see if dizziness resolves. She should follow up with PCP.

## 2018-02-11 NOTE — NURSING TRANSFER
Nursing Transfer Note      4/1/2017     Transfer to 401 from 263     Transfer via wheelchair     Transfer with hospital provided toiletries    Transported by RN    Medicines sent: insulin    Chart send with patient: yes    Notified: Will notify family 0600    Patient reassessed at: 0230 4/1/17       -s/p cardiac arrest with prolonged CPR. Down time >50 min.  Current on pressors , intubated. Will not do hypothermia therapy due to prolong "down time" and prognosis

## 2018-05-30 ENCOUNTER — HOSPITAL ENCOUNTER (EMERGENCY)
Facility: HOSPITAL | Age: 78
Discharge: HOME | End: 2018-05-30
Attending: EMERGENCY MEDICINE
Payer: MEDICARE

## 2018-05-30 VITALS
TEMPERATURE: 99 F | HEART RATE: 79 BPM | WEIGHT: 163 LBS | RESPIRATION RATE: 20 BRPM | BODY MASS INDEX: 24.71 KG/M2 | OXYGEN SATURATION: 99 % | SYSTOLIC BLOOD PRESSURE: 152 MMHG | HEIGHT: 68 IN | DIASTOLIC BLOOD PRESSURE: 77 MMHG

## 2018-05-30 DIAGNOSIS — R42 DIZZINESS: ICD-10-CM

## 2018-05-30 DIAGNOSIS — T88.7XXA MEDICATION SIDE EFFECT: Primary | ICD-10-CM

## 2018-05-30 LAB
ALBUMIN SERPL BCP-MCNC: 3.8 G/DL
ALP SERPL-CCNC: 70 U/L
ALT SERPL W/O P-5'-P-CCNC: 11 U/L
ANION GAP SERPL CALC-SCNC: 9 MMOL/L
AST SERPL-CCNC: 17 U/L
BASOPHILS # BLD AUTO: 0.04 K/UL
BASOPHILS NFR BLD: 0.9 %
BILIRUB SERPL-MCNC: 0.2 MG/DL
BILIRUB UR QL STRIP: NEGATIVE
BUN SERPL-MCNC: 17 MG/DL
CALCIUM SERPL-MCNC: 10 MG/DL
CHLORIDE SERPL-SCNC: 100 MMOL/L
CLARITY UR: CLEAR
CO2 SERPL-SCNC: 27 MMOL/L
COLOR UR: COLORLESS
CREAT SERPL-MCNC: 1 MG/DL
DIFFERENTIAL METHOD: ABNORMAL
EOSINOPHIL # BLD AUTO: 0 K/UL
EOSINOPHIL NFR BLD: 0.2 %
ERYTHROCYTE [DISTWIDTH] IN BLOOD BY AUTOMATED COUNT: 14.9 %
EST. GFR  (AFRICAN AMERICAN): >60 ML/MIN/1.73 M^2
EST. GFR  (NON AFRICAN AMERICAN): 55 ML/MIN/1.73 M^2
GLUCOSE SERPL-MCNC: 91 MG/DL
GLUCOSE UR QL STRIP: NEGATIVE
HCT VFR BLD AUTO: 40.8 %
HGB BLD-MCNC: 13.2 G/DL
HGB UR QL STRIP: NEGATIVE
KETONES UR QL STRIP: NEGATIVE
LEUKOCYTE ESTERASE UR QL STRIP: NEGATIVE
LYMPHOCYTES # BLD AUTO: 1.5 K/UL
LYMPHOCYTES NFR BLD: 33.6 %
MAGNESIUM SERPL-MCNC: 2.3 MG/DL
MCH RBC QN AUTO: 27.3 PG
MCHC RBC AUTO-ENTMCNC: 32.4 G/DL
MCV RBC AUTO: 85 FL
MONOCYTES # BLD AUTO: 0.4 K/UL
MONOCYTES NFR BLD: 9 %
NEUTROPHILS # BLD AUTO: 2.4 K/UL
NEUTROPHILS NFR BLD: 56.1 %
NITRITE UR QL STRIP: NEGATIVE
PH UR STRIP: 6 [PH] (ref 5–8)
PLATELET # BLD AUTO: 279 K/UL
PMV BLD AUTO: 9.7 FL
POTASSIUM SERPL-SCNC: 4 MMOL/L
PROT SERPL-MCNC: 8.4 G/DL
PROT UR QL STRIP: NEGATIVE
RBC # BLD AUTO: 4.83 M/UL
SODIUM SERPL-SCNC: 136 MMOL/L
SP GR UR STRIP: 1 (ref 1–1.03)
TROPONIN I SERPL DL<=0.01 NG/ML-MCNC: <0.006 NG/ML
URN SPEC COLLECT METH UR: ABNORMAL
UROBILINOGEN UR STRIP-ACNC: NEGATIVE EU/DL
WBC # BLD AUTO: 4.32 K/UL

## 2018-05-30 PROCEDURE — 25000003 PHARM REV CODE 250: Performed by: EMERGENCY MEDICINE

## 2018-05-30 PROCEDURE — 99284 EMERGENCY DEPT VISIT MOD MDM: CPT | Mod: 25

## 2018-05-30 PROCEDURE — 93005 ELECTROCARDIOGRAM TRACING: CPT

## 2018-05-30 PROCEDURE — 83735 ASSAY OF MAGNESIUM: CPT

## 2018-05-30 PROCEDURE — 93010 ELECTROCARDIOGRAM REPORT: CPT | Mod: ,,, | Performed by: INTERNAL MEDICINE

## 2018-05-30 PROCEDURE — 85025 COMPLETE CBC W/AUTO DIFF WBC: CPT

## 2018-05-30 PROCEDURE — 81003 URINALYSIS AUTO W/O SCOPE: CPT

## 2018-05-30 PROCEDURE — 84484 ASSAY OF TROPONIN QUANT: CPT

## 2018-05-30 PROCEDURE — 80053 COMPREHEN METABOLIC PANEL: CPT

## 2018-05-30 PROCEDURE — 96360 HYDRATION IV INFUSION INIT: CPT

## 2018-05-30 RX ORDER — MELOXICAM 7.5 MG/1
7.5 TABLET ORAL DAILY
Status: ON HOLD | COMMUNITY
End: 2019-12-07 | Stop reason: HOSPADM

## 2018-05-30 RX ORDER — CHLORTHALIDONE 25 MG/1
25 TABLET ORAL DAILY
COMMUNITY

## 2018-05-30 RX ADMIN — SODIUM CHLORIDE 500 ML: 0.9 INJECTION, SOLUTION INTRAVENOUS at 03:05

## 2018-05-30 NOTE — ED TRIAGE NOTES
"Pt here for dizziness that she states "started last week when my doctor started me on 2 new pills. A fluid pill and an arthritis pill". Pt denies falling, any LOC. Reports dizziness "just when I try to walk". No chest pain, SOB, N/V/D.  "

## 2018-05-30 NOTE — ED PROVIDER NOTES
Encounter Date: 5/30/2018       History     Chief Complaint   Patient presents with    Dizziness     States she feels dizzy x 3 days.  States she changed her medications recently and that her doctor just advised her to drink more water     78 y/o AA female with a hx of DM and HTN with recent addition of new BP medication of chlorthalidone on 5/21/2018 and since then has had dizziness with walking. Per chart review, patient has had issues with dizziness with walking since 2017 with negative ECHO and w/u with cause being medication SE. Denies CP, sob, weakness, numbness, tingling or urinary symptoms.           Review of patient's allergies indicates:  No Known Allergies  Past Medical History:   Diagnosis Date    Diabetes mellitus     Hypertension      Past Surgical History:   Procedure Laterality Date    COLONOSCOPY N/A 3/31/2017    Procedure: COLONOSCOPY;  Surgeon: Apolinar Matute MD;  Location: [facility];  Service: Endoscopy;  Laterality: N/A;    OVARY SURGERY       No family history on file.  Social History   Substance Use Topics    Smoking status: Never Smoker    Smokeless tobacco: Never Used    Alcohol use No     Review of Systems   Constitutional: Negative for chills and fever.   HENT: Negative for congestion and ear pain.    Eyes: Negative for pain and visual disturbance.   Respiratory: Negative for cough and shortness of breath.    Cardiovascular: Negative for chest pain, palpitations and leg swelling.   Gastrointestinal: Negative for abdominal pain, diarrhea, nausea and vomiting.   Genitourinary: Negative for dysuria and hematuria.   Musculoskeletal: Negative for arthralgias, back pain and myalgias.   Skin: Negative for color change, pallor, rash and wound.   Allergic/Immunologic: Negative for environmental allergies, food allergies and immunocompromised state.   Neurological: Positive for dizziness. Negative for weakness, light-headedness and headaches.   Hematological: Negative for adenopathy. Does  not bruise/bleed easily.   Psychiatric/Behavioral: Negative for agitation, behavioral problems and confusion.   All other systems reviewed and are negative.      Physical Exam     Initial Vitals [05/30/18 1226]   BP Pulse Resp Temp SpO2   130/63 82 16 98.7 °F (37.1 °C) 99 %      MAP       85.33         Physical Exam    Nursing note and vitals reviewed.  Constitutional: She appears well-developed and well-nourished. She is not diaphoretic. No distress.   HENT:   Head: Normocephalic and atraumatic.   Mouth/Throat: Oropharynx is clear and moist. No oropharyngeal exudate.   Eyes: EOM are normal. Pupils are equal, round, and reactive to light.   Neck: Normal range of motion. Neck supple.   Cardiovascular: Normal rate, regular rhythm, normal heart sounds and intact distal pulses. Exam reveals no gallop and no friction rub.    No murmur heard.  Dizziness with standing   Pulmonary/Chest: Breath sounds normal. No stridor. No respiratory distress. She has no wheezes. She has no rhonchi. She has no rales. She exhibits no tenderness.   Abdominal: Soft. Bowel sounds are normal. She exhibits no distension and no mass. There is no tenderness. There is no rebound and no guarding.   Musculoskeletal: Normal range of motion. She exhibits no edema or tenderness.   Neurological: She is alert and oriented to person, place, and time. She has normal strength. No cranial nerve deficit or sensory deficit.   Normal gait with hand held assist (baseline is gait with walker). Normal strength. No pronator drift. No nystagmus.   Skin: Skin is warm and dry.   Psychiatric: She has a normal mood and affect. Her behavior is normal. Thought content normal.         ED Course   Procedures  Labs Reviewed   CBC W/ AUTO DIFFERENTIAL - Abnormal; Notable for the following:        Result Value    RDW 14.9 (*)     All other components within normal limits   COMPREHENSIVE METABOLIC PANEL - Abnormal; Notable for the following:     eGFR if non  55  (*)     All other components within normal limits   URINALYSIS, REFLEX TO URINE CULTURE - Abnormal; Notable for the following:     Color, UA Colorless (*)     All other components within normal limits    Narrative:     Preferred Collection Type->Urine, Clean Catch   TROPONIN I   MAGNESIUM     EKG Readings: (Independently Interpreted)   Initial Reading: No STEMI. Previous EKG: Compared with most recent EKG Previous EKG Date: 8/18/17. Heart Rate: 73.   NSR rate 73 with inferior and septa Q waves unchnged from previous.        X-Rays:   Independently Interpreted Readings:   Chest X-Ray: Normal heart size.  No infiltrates.  No acute abnormalities.     Medical Decision Making:   Initial Assessment:   78 y/o AA female with a hx of DM and HTN with recent addition of new BP medication of chlorthalidone on 5/21/2018 and since then has had dizziness with walking.   Differential Diagnosis:   Medication SE vs anemia vs CVA (not suspected) vs metabolic derangement vs UTI. Exam consistent with medication SE>   ED Management:  Cardiac w/u negative. Symptoms improved s/p 500 cc fluid bolus. Patient instructed to f/u with pcp to decrease new BP medicaion by half dose. Patient agrees with POC. Discharged at ths time.                       Clinical Impression:   The primary encounter diagnosis was Medication side effect. A diagnosis of Dizziness was also pertinent to this visit.                           Destiny Chavez MD  05/30/18 1640

## 2018-06-13 ENCOUNTER — HOSPITAL ENCOUNTER (EMERGENCY)
Facility: HOSPITAL | Age: 78
Discharge: HOME | End: 2018-06-13
Attending: EMERGENCY MEDICINE
Payer: MEDICARE

## 2018-06-13 VITALS
SYSTOLIC BLOOD PRESSURE: 128 MMHG | BODY MASS INDEX: 27.49 KG/M2 | HEIGHT: 65 IN | TEMPERATURE: 99 F | RESPIRATION RATE: 16 BRPM | DIASTOLIC BLOOD PRESSURE: 67 MMHG | HEART RATE: 68 BPM | OXYGEN SATURATION: 98 % | WEIGHT: 165 LBS

## 2018-06-13 DIAGNOSIS — R42 VERTIGO: Primary | ICD-10-CM

## 2018-06-13 DIAGNOSIS — R42 LIGHTHEADEDNESS: ICD-10-CM

## 2018-06-13 LAB — POCT GLUCOSE: 114 MG/DL (ref 70–110)

## 2018-06-13 PROCEDURE — 93010 ELECTROCARDIOGRAM REPORT: CPT | Mod: ,,, | Performed by: INTERNAL MEDICINE

## 2018-06-13 PROCEDURE — 82962 GLUCOSE BLOOD TEST: CPT

## 2018-06-13 PROCEDURE — 25000003 PHARM REV CODE 250: Performed by: EMERGENCY MEDICINE

## 2018-06-13 PROCEDURE — 99284 EMERGENCY DEPT VISIT MOD MDM: CPT | Mod: 25

## 2018-06-13 RX ORDER — MECLIZINE HYDROCHLORIDE 25 MG/1
25 TABLET ORAL 3 TIMES DAILY PRN
Qty: 20 TABLET | Refills: 0 | Status: SHIPPED | OUTPATIENT
Start: 2018-06-13 | End: 2019-02-20

## 2018-06-13 RX ORDER — MECLIZINE HYDROCHLORIDE 25 MG/1
25 TABLET ORAL
Status: COMPLETED | OUTPATIENT
Start: 2018-06-13 | End: 2018-06-13

## 2018-06-13 RX ADMIN — MECLIZINE HYDROCHLORIDE 25 MG: 25 TABLET ORAL at 08:06

## 2018-06-14 NOTE — ED TRIAGE NOTES
Patient presents to the ER via personal vehicle. Patient presents with dizziness that started this morning after she took her blood pressure medicine. Denies chest pain, SOB, n/v/d. Denies pain.

## 2018-06-14 NOTE — ED PROVIDER NOTES
Encounter Date: 6/13/2018    SCRIBE #1 NOTE: ICharley, am scribing for, and in the presence of, Edgard Champagne MD.      SORT:   Pt is a 76 y/o female with history of HTN, DM who presents for evaluation of lightheadedness that occurs with walking. Pt states evaluated at this facility for similar symptoms and was instructed by PCP to cut her fluid pill in half. Pt states she has been doing this for 2-3 weeks and has not had these symptoms until they returned today. Denies CP, SOB. Glucose 114.  Initial orders placed. Carmella Brand PA-C/   History     Chief Complaint   Patient presents with    Dizziness     Pt c/o dizziness since this morning.  Reports she was in here a couple weeks ago for the same problem.  Reports PCP told her to cut her fluid pill in half after last visit to the ED.      CC: Dizziness    HPI: This 77 y.o female who has DM and HTN presents to the ED for an evaluation of acute onset, constant dizziness and feeling off balance that began this morning.  Patient also reports of intermittent tinnitus.  Patient reports she was evaluated in this ED a few weeks ago for the same symptoms.  Patient reports her symptoms are worse with walking and bending.  Patient reports she has been compliant with her HTN medication and reports a diuretic was recently added to her medications.  Patient denies fever, chills, nausea, emesis, diarrhea, generalized weakness, abdominal pain, chest pain, shortness of breath, or any other associated symptoms.  No prior tx.  No alleviating factors.       The history is provided by the patient. No  was used.     Review of patient's allergies indicates:  No Known Allergies  Past Medical History:   Diagnosis Date    Diabetes mellitus     Hypertension      Past Surgical History:   Procedure Laterality Date    COLONOSCOPY N/A 3/31/2017    Procedure: COLONOSCOPY;  Surgeon: Apolinar Matute MD;  Location: [facility];  Service: Endoscopy;  Laterality: N/A;     OVARY SURGERY       History reviewed. No pertinent family history.  Social History   Substance Use Topics    Smoking status: Never Smoker    Smokeless tobacco: Never Used    Alcohol use No     Review of Systems   Constitutional: Negative for chills and fever.   HENT: Positive for tinnitus. Negative for ear pain and sore throat.    Eyes: Negative for pain.   Respiratory: Negative for cough and shortness of breath.    Cardiovascular: Negative for chest pain.   Gastrointestinal: Negative for abdominal pain, diarrhea, nausea and vomiting.   Genitourinary: Negative for dysuria.   Musculoskeletal: Negative for back pain.   Skin: Negative for rash.   Neurological: Positive for dizziness. Negative for headaches.       Physical Exam     Initial Vitals [06/13/18 1919]   BP Pulse Resp Temp SpO2   (!) 147/68 73 16 98.5 °F (36.9 °C) 100 %      MAP       --         Physical Exam    Nursing note and vitals reviewed.  Constitutional: She appears well-developed and well-nourished. She is not diaphoretic. No distress.   HENT:   Head: Normocephalic and atraumatic.   Right Ear: Tympanic membrane normal.   Left Ear: Tympanic membrane normal.   Mouth/Throat: Oropharynx is clear and moist. No oropharyngeal exudate.   Eyes: EOM are normal. Pupils are equal, round, and reactive to light.   Neck: Neck supple.   Cardiovascular: Normal rate, regular rhythm and normal heart sounds. Exam reveals no gallop and no friction rub.    No murmur heard.  Pulmonary/Chest: Breath sounds normal. No respiratory distress. She has no wheezes. She has no rhonchi. She has no rales. She exhibits no tenderness.   Abdominal: Soft. Normal appearance and bowel sounds are normal. She exhibits no distension. There is no tenderness. There is no rebound and no guarding.   Musculoskeletal: Normal range of motion. She exhibits no edema.   Neurological: She is alert and oriented to person, place, and time.   Skin: Skin is warm and dry.   Psychiatric: She has a  normal mood and affect.         ED Course   Procedures  Labs Reviewed   POCT GLUCOSE - Abnormal; Notable for the following:        Result Value    POCT Glucose 114 (*)     All other components within normal limits   POCT GLUCOSE MONITORING CONTINUOUS     EKG Readings: (Independently Interpreted)   Initial Reading: No STEMI. Rhythm: Normal Sinus Rhythm. Heart Rate: 69 bpm. Ectopy: No Ectopy. Conduction: 1st Degree AV Block. ST Segments: Normal ST Segments. T Waves: Normal. Axis: Normal. Clinical Impression: Normal Sinus Rhythm and AV Block - 1st Degree   Unchanged from 5/30/18.       CT Head Without Contrast   Final Result      1. No acute intracranial abnormalities, grossly stable sequela of chronic microvascular ischemic change and senescent change.         Electronically signed by: Rivas Dsouza MD   Date:    06/13/2018   Time:    20:49           Medical Decision Making:   History:   Old Medical Records: I decided to obtain old medical records.  ED Management:  Patient will be treated with meclizine while in the ED for her symptoms and CT imaging of her head.  Upon reassessment, patient reports her dizziness has improved after receiving the meclizine.      medical decision making patient had been seen previously for dizziness which resolved with IV fluids and her medication was adjusted did not feel that labs are indicated at this time as they were done previously her symptoms really seem more related to peripheral vertigo and I think this is more now with resolution of her symptoms with meclizine 25 mg I did obtain a head CT to rule out any kind of intracranial process due to the persistence of her symptoms and this was normal so she is discharged home in stable and improved resolved condition. With a prescription for meclizine       Scribe Attestation:   Scribe #1: I performed the above scribed service and the documentation accurately describes the services I performed. I attest to the accuracy of the  note.    Attending Attestation:           Physician Attestation for Scribe:  Physician Attestation Statement for Scribe #1: I, Edgard Champagne MD, reviewed documentation, as scribed by Charley Balbuena in my presence, and it is both accurate and complete.                    Clinical Impression:   The primary encounter diagnosis was Vertigo. A diagnosis of Lightheadedness was also pertinent to this visit.                             Edgard Champagne MD  06/13/18 4791

## 2018-06-15 ENCOUNTER — PES CALL (OUTPATIENT)
Dept: ADMINISTRATIVE | Facility: CLINIC | Age: 78
End: 2018-06-15

## 2019-02-13 DIAGNOSIS — Z78.0 MENOPAUSE: Primary | ICD-10-CM

## 2019-02-20 ENCOUNTER — HOSPITAL ENCOUNTER (OUTPATIENT)
Dept: RADIOLOGY | Facility: HOSPITAL | Age: 79
Discharge: HOME | End: 2019-02-20
Attending: INTERNAL MEDICINE
Payer: MEDICARE

## 2019-02-20 ENCOUNTER — OFFICE VISIT (OUTPATIENT)
Dept: OBSTETRICS AND GYNECOLOGY | Facility: CLINIC | Age: 79
End: 2019-02-20
Payer: MEDICARE

## 2019-02-20 VITALS
TEMPERATURE: 99 F | SYSTOLIC BLOOD PRESSURE: 130 MMHG | DIASTOLIC BLOOD PRESSURE: 76 MMHG | BODY MASS INDEX: 30.42 KG/M2 | WEIGHT: 182.56 LBS | HEIGHT: 65 IN

## 2019-02-20 DIAGNOSIS — Z78.0 MENOPAUSE: ICD-10-CM

## 2019-02-20 DIAGNOSIS — Z46.89 PESSARY MAINTENANCE: Primary | ICD-10-CM

## 2019-02-20 DIAGNOSIS — N95.2 ATROPHIC VAGINITIS: ICD-10-CM

## 2019-02-20 PROCEDURE — 77080 DEXA BONE DENSITY SPINE HIP: ICD-10-PCS | Mod: 26,,, | Performed by: RADIOLOGY

## 2019-02-20 PROCEDURE — 77080 DXA BONE DENSITY AXIAL: CPT | Mod: TC

## 2019-02-20 PROCEDURE — 77080 DXA BONE DENSITY AXIAL: CPT | Mod: 26,,, | Performed by: RADIOLOGY

## 2019-02-20 PROCEDURE — 99204 PR OFFICE/OUTPT VISIT, NEW, LEVL IV, 45-59 MIN: ICD-10-PCS | Mod: S$GLB,,, | Performed by: OBSTETRICS & GYNECOLOGY

## 2019-02-20 PROCEDURE — 99204 OFFICE O/P NEW MOD 45 MIN: CPT | Mod: S$GLB,,, | Performed by: OBSTETRICS & GYNECOLOGY

## 2019-02-20 PROCEDURE — 3078F PR MOST RECENT DIASTOLIC BLOOD PRESSURE < 80 MM HG: ICD-10-PCS | Mod: CPTII,S$GLB,, | Performed by: OBSTETRICS & GYNECOLOGY

## 2019-02-20 PROCEDURE — 1101F PR PT FALLS ASSESS DOC 0-1 FALLS W/OUT INJ PAST YR: ICD-10-PCS | Mod: CPTII,S$GLB,, | Performed by: OBSTETRICS & GYNECOLOGY

## 2019-02-20 PROCEDURE — 3078F DIAST BP <80 MM HG: CPT | Mod: CPTII,S$GLB,, | Performed by: OBSTETRICS & GYNECOLOGY

## 2019-02-20 PROCEDURE — 99999 PR PBB SHADOW E&M-EST. PATIENT-LVL III: ICD-10-PCS | Mod: PBBFAC,,, | Performed by: OBSTETRICS & GYNECOLOGY

## 2019-02-20 PROCEDURE — 1101F PT FALLS ASSESS-DOCD LE1/YR: CPT | Mod: CPTII,S$GLB,, | Performed by: OBSTETRICS & GYNECOLOGY

## 2019-02-20 PROCEDURE — 99999 PR PBB SHADOW E&M-EST. PATIENT-LVL III: CPT | Mod: PBBFAC,,, | Performed by: OBSTETRICS & GYNECOLOGY

## 2019-02-20 PROCEDURE — 3075F PR MOST RECENT SYSTOLIC BLOOD PRESS GE 130-139MM HG: ICD-10-PCS | Mod: CPTII,S$GLB,, | Performed by: OBSTETRICS & GYNECOLOGY

## 2019-02-20 PROCEDURE — 3075F SYST BP GE 130 - 139MM HG: CPT | Mod: CPTII,S$GLB,, | Performed by: OBSTETRICS & GYNECOLOGY

## 2019-02-20 RX ORDER — LOSARTAN POTASSIUM 100 MG/1
TABLET ORAL
Refills: 0 | COMMUNITY
Start: 2019-02-15

## 2019-02-20 RX ORDER — LATANOPROST 50 UG/ML
SOLUTION/ DROPS OPHTHALMIC
Refills: 3 | COMMUNITY
Start: 2019-02-15

## 2019-02-20 RX ORDER — METRONIDAZOLE 500 MG/1
500 TABLET ORAL EVERY 12 HOURS
Qty: 14 TABLET | Refills: 0 | Status: SHIPPED | OUTPATIENT
Start: 2019-02-20 | End: 2019-02-27

## 2019-02-20 RX ORDER — ASPIRIN 81 MG/1
81 TABLET ORAL DAILY
Status: ON HOLD | COMMUNITY
End: 2019-12-06 | Stop reason: HOSPADM

## 2019-02-20 RX ORDER — ESTRADIOL 0.1 MG/G
1 CREAM VAGINAL DAILY
Qty: 42.5 G | Refills: 1 | Status: SHIPPED | OUTPATIENT
Start: 2019-02-20 | End: 2019-05-05 | Stop reason: SDUPTHER

## 2019-02-20 NOTE — PROGRESS NOTES
Subjective:       Patient ID: Radha Morales is a 78 y.o. female.    Chief Complaint:  Gynecologic Exam (Pessary cleaning)      History of Present Illness  HPI  Patient comes in today for pessary cleaning and maintenance.   Has been a patient of Dr Singh.  Using pessary for about 1.5 - 2 years.  Has been coming for pessary cleaning every three months.    No complaint today except for having a discharge.    Status post vaginal deliveries x 7  Status post hysterectomy  Now with vaginal prolapse.    GYN & OB History  No LMP recorded. Patient is postmenopausal.   Date of Last Pap: No result found    OB History    Para Term  AB Living   7 7 7 0 0 7   SAB TAB Ectopic Multiple Live Births                  # Outcome Date GA Lbr Kunal/2nd Weight Sex Delivery Anes PTL Lv   7 Term            6 Term            5 Term            4 Term            3 Term            2 Term            1 Term                 Past Medical History:   Diagnosis Date    Arthritis     Diabetes mellitus     Hypertension        Past Surgical History:   Procedure Laterality Date    COLONOSCOPY N/A 3/31/2017    [facility]O    HYSTERECTOMY      OVARY SURGERY         Family History   Problem Relation Age of Onset    Diabetes Father     Diabetes Mother        Social History     Socioeconomic History    Marital status:      Spouse name: None    Number of children: None    Years of education: None    Highest education level: None   Social Needs    Financial resource strain: None    Food insecurity - worry: None    Food insecurity - inability: None    Transportation needs - medical: None    Transportation needs - non-medical: None   Occupational History    None   Tobacco Use    Smoking status: Never Smoker    Smokeless tobacco: Never Used   Substance and Sexual Activity    Alcohol use: No    Drug use: No    Sexual activity: No     Birth control/protection: None   Other Topics  Concern    None   Social History Narrative    .  .    Ex-     Lives alone       Current Outpatient Medications   Medication Sig Dispense Refill    aspirin (ECOTRIN) 81 MG EC tablet Take 81 mg by mouth once daily.      chlorthalidone (HYGROTEN) 25 MG Tab Take 25 mg by mouth once daily.      latanoprost 0.005 % ophthalmic solution INSTILL 1 DROP INTO BOTH EYES QHS  3    losartan (COZAAR) 100 MG tablet TK 1 T PO D  0    meloxicam (MOBIC) 7.5 MG tablet Take 7.5 mg by mouth once daily.      metformin (GLUCOPHAGE) 1000 MG tablet Take 1,000 mg by mouth 2 (two) times daily with meals.      multivit,iron,minerals/lutein (CENTRUM SILVER ULTRA WOMEN'S ORAL) Take by mouth.      estradiol (ESTRACE) 0.01 % (0.1 mg/gram) vaginal cream Place 1 g vaginally once daily. 42.5 g 1     No current facility-administered medications for this visit.        Review of patient's allergies indicates:  No Known Allergies    Review of Systems  Review of Systems   Constitutional: Negative for activity change, appetite change, chills, fatigue, fever and unexpected weight change.   HENT: Negative for mouth sores.    Respiratory: Negative for cough, shortness of breath and wheezing.    Cardiovascular: Negative for chest pain and palpitations.   Gastrointestinal: Negative for abdominal pain, bloating, blood in stool, constipation, nausea and vomiting.   Endocrine: Negative for diabetes and hot flashes.   Genitourinary: Negative for dysmenorrhea, dyspareunia, dysuria, frequency, hematuria, menorrhagia, menstrual problem, pelvic pain, urgency, vaginal bleeding, vaginal discharge, vaginal pain, urinary incontinence, postcoital bleeding and vaginal odor.        Vaginal vault prolapse   Musculoskeletal: Positive for arthralgias and joint swelling. Negative for back pain and myalgias.   Integumentary:  Negative for rash, breast mass and nipple discharge.   Neurological: Negative for seizures and headaches.    Psychiatric/Behavioral: Negative for depression and sleep disturbance. The patient is not nervous/anxious.    Breast: Negative for mass, mastodynia and nipple discharge          Objective:    Physical Exam:   Constitutional: She appears well-developed and well-nourished. No distress.    HENT:   Head: Normocephalic and atraumatic.    Eyes: EOM are normal.    Neck: Normal range of motion.     Pulmonary/Chest: Effort normal. No respiratory distress.   Breasts: Non-tender, no engorgement, no masses, no retraction, no discharge. Negative for lymphadenopathy.         Abdominal: Soft. She exhibits no distension. There is no tenderness. There is no rebound and no guarding.   Vertical scar      Genitourinary: Vagina normal. No vaginal discharge found.   Genitourinary Comments: Significant atrophy.  Vulva without any obvious lesions.  Vaginal vault with prolapse to introitus.  Minimal white discharge noted.  No obvious lesion.  Vaginal cuff intact with multiple areas of abrasion/ulcerations.  Cervix and Uterus are surgically absent.  Adnexa is without any masses or tenderness.           Musculoskeletal: Normal range of motion.       Neurological: She is alert.    Skin: Skin is warm and dry.    Psychiatric: She has a normal mood and affect.          Assessment:        1. Pessary maintenance    2. Menopause             Plan:      I have extensively discussed with the patient regarding her condition  I suggest that she should have the pessary out for at least a couple of weeks  Estrace cream and metronidazole prescribed for atrophic vaginitis.     Estrace cream 1 gm pv qhs x 2 weeks, then qod x 2 weeks, then 2x/week.  Flagyl 500 mg bid x 7 days    Back in 2 weeks for follow-up.

## 2019-03-11 ENCOUNTER — OFFICE VISIT (OUTPATIENT)
Dept: OBSTETRICS AND GYNECOLOGY | Facility: CLINIC | Age: 79
End: 2019-03-11
Payer: MEDICARE

## 2019-03-11 VITALS
SYSTOLIC BLOOD PRESSURE: 130 MMHG | BODY MASS INDEX: 29.87 KG/M2 | WEIGHT: 179.25 LBS | HEIGHT: 65 IN | DIASTOLIC BLOOD PRESSURE: 74 MMHG

## 2019-03-11 DIAGNOSIS — Z46.89 PESSARY MAINTENANCE: Primary | ICD-10-CM

## 2019-03-11 DIAGNOSIS — Z78.0 MENOPAUSE: ICD-10-CM

## 2019-03-11 DIAGNOSIS — N95.2 ATROPHIC VAGINITIS: ICD-10-CM

## 2019-03-11 PROCEDURE — 3078F DIAST BP <80 MM HG: CPT | Mod: CPTII,S$GLB,, | Performed by: OBSTETRICS & GYNECOLOGY

## 2019-03-11 PROCEDURE — 3075F PR MOST RECENT SYSTOLIC BLOOD PRESS GE 130-139MM HG: ICD-10-PCS | Mod: CPTII,S$GLB,, | Performed by: OBSTETRICS & GYNECOLOGY

## 2019-03-11 PROCEDURE — 99999 PR PBB SHADOW E&M-EST. PATIENT-LVL III: ICD-10-PCS | Mod: PBBFAC,,, | Performed by: OBSTETRICS & GYNECOLOGY

## 2019-03-11 PROCEDURE — 1101F PR PT FALLS ASSESS DOC 0-1 FALLS W/OUT INJ PAST YR: ICD-10-PCS | Mod: CPTII,S$GLB,, | Performed by: OBSTETRICS & GYNECOLOGY

## 2019-03-11 PROCEDURE — 3078F PR MOST RECENT DIASTOLIC BLOOD PRESSURE < 80 MM HG: ICD-10-PCS | Mod: CPTII,S$GLB,, | Performed by: OBSTETRICS & GYNECOLOGY

## 2019-03-11 PROCEDURE — 1101F PT FALLS ASSESS-DOCD LE1/YR: CPT | Mod: CPTII,S$GLB,, | Performed by: OBSTETRICS & GYNECOLOGY

## 2019-03-11 PROCEDURE — 3075F SYST BP GE 130 - 139MM HG: CPT | Mod: CPTII,S$GLB,, | Performed by: OBSTETRICS & GYNECOLOGY

## 2019-03-11 PROCEDURE — 99213 PR OFFICE/OUTPT VISIT, EST, LEVL III, 20-29 MIN: ICD-10-PCS | Mod: S$GLB,,, | Performed by: OBSTETRICS & GYNECOLOGY

## 2019-03-11 PROCEDURE — 99213 OFFICE O/P EST LOW 20 MIN: CPT | Mod: S$GLB,,, | Performed by: OBSTETRICS & GYNECOLOGY

## 2019-03-11 PROCEDURE — 99999 PR PBB SHADOW E&M-EST. PATIENT-LVL III: CPT | Mod: PBBFAC,,, | Performed by: OBSTETRICS & GYNECOLOGY

## 2019-03-11 NOTE — PROGRESS NOTES
Subjective:       Patient ID: Radha Morales is a 78 y.o. female.    Chief Complaint:  Follow-up (2 wks follow up pessary)      History of Present Illness  HPI  Patient comes in today for follow-up  Seen on 2019 for pessary maintenance.  Found to have vaginal ulceration.  Has not been seen for awhile with pessary in place.  Pessary instructed to be left out.  Estrace cream given    No complaint today.      GYN & OB History  No LMP recorded. Patient is postmenopausal.   Date of Last Pap: No result found    OB History    Para Term  AB Living   7 7 7 0 0 7   SAB TAB Ectopic Multiple Live Births                  # Outcome Date GA Lbr Kunal/2nd Weight Sex Delivery Anes PTL Lv   7 Term            6 Term            5 Term            4 Term            3 Term            2 Term            1 Term                 Past Medical History:   Diagnosis Date    Arthritis     Diabetes mellitus     Hypertension        Past Surgical History:   Procedure Laterality Date    COLONOSCOPY N/A 3/31/2017    [facility]    HYSTERECTOMY      OVARY SURGERY         Family History   Problem Relation Age of Onset    Diabetes Father     Diabetes Mother        Social History     Socioeconomic History    Marital status:      Spouse name: None    Number of children: None    Years of education: None    Highest education level: None   Social Needs    Financial resource strain: None    Food insecurity - worry: None    Food insecurity - inability: None    Transportation needs - medical: None    Transportation needs - non-medical: None   Occupational History    None   Tobacco Use    Smoking status: Never Smoker    Smokeless tobacco: Never Used   Substance and Sexual Activity    Alcohol use: No    Drug use: No    Sexual activity: No     Birth control/protection: None   Other Topics Concern    None   Social History Narrative    .  .    Ex-      Lives alone       Current Outpatient Medications   Medication Sig Dispense Refill    aspirin (ECOTRIN) 81 MG EC tablet Take 81 mg by mouth once daily.      chlorthalidone (HYGROTEN) 25 MG Tab Take 25 mg by mouth once daily.      estradiol (ESTRACE) 0.01 % (0.1 mg/gram) vaginal cream Place 1 g vaginally once daily. 42.5 g 1    latanoprost 0.005 % ophthalmic solution INSTILL 1 DROP INTO BOTH EYES QHS  3    losartan (COZAAR) 100 MG tablet TK 1 T PO D  0    meloxicam (MOBIC) 7.5 MG tablet Take 7.5 mg by mouth once daily.      metformin (GLUCOPHAGE) 1000 MG tablet Take 1,000 mg by mouth 2 (two) times daily with meals.      multivit,iron,minerals/lutein (CENTRUM SILVER ULTRA WOMEN'S ORAL) Take by mouth.       No current facility-administered medications for this visit.        Review of patient's allergies indicates:  No Known Allergies    Review of Systems  Review of Systems   Constitutional: Negative for activity change, appetite change, chills, fatigue, fever and unexpected weight change.   HENT: Negative for mouth sores.         Hard of hearing   Respiratory: Negative for cough, shortness of breath and wheezing.    Cardiovascular: Negative for chest pain and palpitations.   Gastrointestinal: Negative for abdominal pain, bloating, blood in stool, constipation, nausea and vomiting.   Endocrine: Negative for diabetes and hot flashes.   Genitourinary: Negative for dysmenorrhea, dyspareunia, dysuria, frequency, hematuria, menorrhagia, menstrual problem, pelvic pain, urgency, vaginal bleeding, vaginal discharge, vaginal pain, urinary incontinence, postcoital bleeding and vaginal odor.   Musculoskeletal: Negative for back pain and myalgias.   Integumentary:  Negative for rash, breast mass and nipple discharge.   Neurological: Negative for seizures and headaches.   Psychiatric/Behavioral: Negative for depression and sleep disturbance. The patient is not nervous/anxious.    Breast: Negative for mass, mastodynia and  nipple discharge          Objective:    Physical Exam:   Constitutional: She appears well-developed and well-nourished. No distress.    HENT:   Head: Normocephalic and atraumatic.    Eyes: EOM are normal.    Neck: Normal range of motion.     Pulmonary/Chest: Effort normal. No respiratory distress.        Abdominal: Soft. She exhibits no distension. There is no tenderness. There is no rebound and no guarding.   Vertical scar      Genitourinary: Vagina normal. No vaginal discharge found.   Genitourinary Comments: Significant atrophy.  Vulva without any obvious lesions.  Vaginal vault with prolapse to introitus.  Minimal white discharge noted.  No obvious lesion.  Vaginal cuff intact without lesions, abrasion, or ulcerations.  Cervix and Uterus are surgically absent.  Adnexa is without any masses or tenderness.           Musculoskeletal: Normal range of motion.       Neurological: She is alert.    Skin: Skin is warm and dry.    Psychiatric: She has a normal mood and affect.          Assessment:        1. Pessary maintenance    2. Menopause    3. Atrophic vaginitis              Plan:      I have discussed with the patient regarding her condition  She is doing well now  Pessary replaced.  Continue with estrace cream at least once a week  Basilia exercise    Back in 4 weeks for cleaning and follow-up

## 2019-04-08 ENCOUNTER — OFFICE VISIT (OUTPATIENT)
Dept: OBSTETRICS AND GYNECOLOGY | Facility: CLINIC | Age: 79
End: 2019-04-08
Payer: MEDICARE

## 2019-04-08 VITALS
BODY MASS INDEX: 29.24 KG/M2 | DIASTOLIC BLOOD PRESSURE: 62 MMHG | SYSTOLIC BLOOD PRESSURE: 150 MMHG | WEIGHT: 175.5 LBS | HEIGHT: 65 IN

## 2019-04-08 DIAGNOSIS — Z78.0 MENOPAUSE: ICD-10-CM

## 2019-04-08 DIAGNOSIS — Z46.89 PESSARY MAINTENANCE: ICD-10-CM

## 2019-04-08 DIAGNOSIS — N95.2 ATROPHIC VAGINITIS: Primary | ICD-10-CM

## 2019-04-08 PROCEDURE — 3078F PR MOST RECENT DIASTOLIC BLOOD PRESSURE < 80 MM HG: ICD-10-PCS | Mod: CPTII,S$GLB,, | Performed by: OBSTETRICS & GYNECOLOGY

## 2019-04-08 PROCEDURE — 1101F PR PT FALLS ASSESS DOC 0-1 FALLS W/OUT INJ PAST YR: ICD-10-PCS | Mod: CPTII,S$GLB,, | Performed by: OBSTETRICS & GYNECOLOGY

## 2019-04-08 PROCEDURE — 99213 OFFICE O/P EST LOW 20 MIN: CPT | Mod: S$GLB,,, | Performed by: OBSTETRICS & GYNECOLOGY

## 2019-04-08 PROCEDURE — 3077F SYST BP >= 140 MM HG: CPT | Mod: CPTII,S$GLB,, | Performed by: OBSTETRICS & GYNECOLOGY

## 2019-04-08 PROCEDURE — 99999 PR PBB SHADOW E&M-EST. PATIENT-LVL III: CPT | Mod: PBBFAC,,, | Performed by: OBSTETRICS & GYNECOLOGY

## 2019-04-08 PROCEDURE — 3077F PR MOST RECENT SYSTOLIC BLOOD PRESSURE >= 140 MM HG: ICD-10-PCS | Mod: CPTII,S$GLB,, | Performed by: OBSTETRICS & GYNECOLOGY

## 2019-04-08 PROCEDURE — 99999 PR PBB SHADOW E&M-EST. PATIENT-LVL III: ICD-10-PCS | Mod: PBBFAC,,, | Performed by: OBSTETRICS & GYNECOLOGY

## 2019-04-08 PROCEDURE — 99213 PR OFFICE/OUTPT VISIT, EST, LEVL III, 20-29 MIN: ICD-10-PCS | Mod: S$GLB,,, | Performed by: OBSTETRICS & GYNECOLOGY

## 2019-04-08 PROCEDURE — 1101F PT FALLS ASSESS-DOCD LE1/YR: CPT | Mod: CPTII,S$GLB,, | Performed by: OBSTETRICS & GYNECOLOGY

## 2019-04-08 PROCEDURE — 3078F DIAST BP <80 MM HG: CPT | Mod: CPTII,S$GLB,, | Performed by: OBSTETRICS & GYNECOLOGY

## 2019-04-08 RX ORDER — METRONIDAZOLE 500 MG/1
500 TABLET ORAL EVERY 12 HOURS
Qty: 14 TABLET | Refills: 0 | Status: SHIPPED | OUTPATIENT
Start: 2019-04-08 | End: 2019-04-15

## 2019-04-08 NOTE — PROGRESS NOTES
Subjective:       Patient ID: Radha Morales is a 78 y.o. female.    Chief Complaint:  Pessary Cleaning      History of Present Illness  HPI  Patient comes in today for follow-up  Been on pessary for awhile  Had evidence of ulceration/abrasion in February this year.  Started on estradiol cream and treated for atrophic vaginitis  Back in March without any difficulty.    However, she started to have brown discharge again this month.  Using estrogen cream once a week.    No other complaint.      GYN & OB History  No LMP recorded. Patient is postmenopausal.   Date of Last Pap: No result found    OB History    Para Term  AB Living   7 7 7 0 0 7   SAB TAB Ectopic Multiple Live Births                  # Outcome Date GA Lbr Kunal/2nd Weight Sex Delivery Anes PTL Lv   7 Term            6 Term            5 Term            4 Term            3 Term            2 Term            1 Term              Past Medical History:   Diagnosis Date    Arthritis     Diabetes mellitus     Hypertension        Past Surgical History:   Procedure Laterality Date    COLONOSCOPY N/A 3/31/2017    [facility]    HYSTERECTOMY      OVARY SURGERY         Family History   Problem Relation Age of Onset    Diabetes Father     Diabetes Mother        Social History     Socioeconomic History    Marital status:      Spouse name: Not on file    Number of children: Not on file    Years of education: Not on file    Highest education level: Not on file   Occupational History    Not on file   Social Needs    Financial resource strain: Not on file    Food insecurity:     Worry: Not on file     Inability: Not on file    Transportation needs:     Medical: Not on file     Non-medical: Not on file   Tobacco Use    Smoking status: Never Smoker    Smokeless tobacco: Never Used   Substance and Sexual Activity    Alcohol use: No    Drug use: No    Sexual activity: Never     Birth  control/protection: None   Lifestyle    Physical activity:     Days per week: Not on file     Minutes per session: Not on file    Stress: Not on file   Relationships    Social connections:     Talks on phone: Not on file     Gets together: Not on file     Attends Confucianism service: Not on file     Active member of club or organization: Not on file     Attends meetings of clubs or organizations: Not on file     Relationship status: Not on file   Other Topics Concern    Not on file   Social History Narrative    .  .    Ex-     Lives alone       Current Outpatient Medications   Medication Sig Dispense Refill    aspirin (ECOTRIN) 81 MG EC tablet Take 81 mg by mouth once daily.      chlorthalidone (HYGROTEN) 25 MG Tab Take 25 mg by mouth once daily.      estradiol (ESTRACE) 0.01 % (0.1 mg/gram) vaginal cream Place 1 g vaginally once daily. 42.5 g 1    latanoprost 0.005 % ophthalmic solution INSTILL 1 DROP INTO BOTH EYES QHS  3    losartan (COZAAR) 100 MG tablet TK 1 T PO D  0    meloxicam (MOBIC) 7.5 MG tablet Take 7.5 mg by mouth once daily.      metformin (GLUCOPHAGE) 1000 MG tablet Take 1,000 mg by mouth 2 (two) times daily with meals.      multivit,iron,minerals/lutein (CENTRUM SILVER ULTRA WOMEN'S ORAL) Take by mouth.      metroNIDAZOLE (FLAGYL) 500 MG tablet Take 1 tablet (500 mg total) by mouth every 12 (twelve) hours. for 7 days 14 tablet 0     No current facility-administered medications for this visit.        Review of patient's allergies indicates:  No Known Allergies    Review of Systems  Review of Systems   Constitutional: Negative for activity change, appetite change, chills, fatigue, fever and unexpected weight change.   HENT: Negative for mouth sores.         Hard of hearing   Respiratory: Negative for cough, shortness of breath and wheezing.    Cardiovascular: Negative for chest pain and palpitations.   Gastrointestinal: Negative for abdominal pain, bloating,  blood in stool, constipation, nausea and vomiting.   Endocrine: Negative for diabetes and hot flashes.   Genitourinary: Positive for vaginal discharge and vaginal odor. Negative for dysmenorrhea, dyspareunia, dysuria, frequency, hematuria, menorrhagia, menstrual problem, pelvic pain, urgency, vaginal bleeding, vaginal pain, urinary incontinence and postcoital bleeding.   Musculoskeletal: Negative for back pain and myalgias.   Integumentary:  Negative for rash, breast mass and nipple discharge.   Neurological: Negative for seizures and headaches.   Psychiatric/Behavioral: Negative for depression and sleep disturbance. The patient is not nervous/anxious.    Breast: Negative for mass, mastodynia and nipple discharge          Objective:    Physical Exam:   Constitutional: She appears well-developed and well-nourished. No distress.    HENT:   Head: Normocephalic and atraumatic.    Eyes: EOM are normal.    Neck: Normal range of motion.     Pulmonary/Chest: Effort normal. No respiratory distress.   Breasts: Non-tender, no engorgement, no masses, no retraction, no discharge. Negative for lymphadenopathy.         Abdominal: Soft. She exhibits no distension. There is no tenderness. There is no rebound and no guarding.   Vertical scar      Genitourinary: Vagina normal. No vaginal discharge found.   Genitourinary Comments: Significant atrophy.  Vulva without any obvious lesions.  Vaginal vault with prolapse to introitus.  Moderate brownish red discharge noted.  No obvious lesion.  Vaginal cuff intact with multiple areas of abrasion/ulcerations.  Cervix and Uterus are surgically absent.  Adnexa is without any masses or tenderness.           Musculoskeletal: Normal range of motion.       Neurological: She is alert.    Skin: Skin is warm and dry.    Psychiatric: She has a normal mood and affect.          Assessment:        1. Atrophic vaginitis    2. Pessary maintenance    3. Menopause              Plan:      I have discussed with  the patient regarding her condition.  I think once a week for estradiol cream is not enough  Will try estrace cream nightly for one week, then three times a week  Metronidazole prescribed again    She would like to be back next week to see if we could replace her pessary.

## 2019-04-08 NOTE — PATIENT INSTRUCTIONS
Please use the Estrace cream every night for a week, then three times a week  Back next week as scheduled.

## 2019-04-15 ENCOUNTER — OFFICE VISIT (OUTPATIENT)
Dept: OBSTETRICS AND GYNECOLOGY | Facility: CLINIC | Age: 79
End: 2019-04-15
Payer: MEDICARE

## 2019-04-15 VITALS
BODY MASS INDEX: 29.53 KG/M2 | WEIGHT: 177.25 LBS | HEIGHT: 65 IN | DIASTOLIC BLOOD PRESSURE: 68 MMHG | SYSTOLIC BLOOD PRESSURE: 120 MMHG

## 2019-04-15 DIAGNOSIS — Z46.89 PESSARY MAINTENANCE: Primary | ICD-10-CM

## 2019-04-15 DIAGNOSIS — N95.2 ATROPHIC VAGINITIS: ICD-10-CM

## 2019-04-15 DIAGNOSIS — Z78.0 MENOPAUSE: ICD-10-CM

## 2019-04-15 PROCEDURE — 1101F PT FALLS ASSESS-DOCD LE1/YR: CPT | Mod: CPTII,S$GLB,, | Performed by: OBSTETRICS & GYNECOLOGY

## 2019-04-15 PROCEDURE — 99213 PR OFFICE/OUTPT VISIT, EST, LEVL III, 20-29 MIN: ICD-10-PCS | Mod: S$GLB,,, | Performed by: OBSTETRICS & GYNECOLOGY

## 2019-04-15 PROCEDURE — 3074F SYST BP LT 130 MM HG: CPT | Mod: CPTII,S$GLB,, | Performed by: OBSTETRICS & GYNECOLOGY

## 2019-04-15 PROCEDURE — 3078F PR MOST RECENT DIASTOLIC BLOOD PRESSURE < 80 MM HG: ICD-10-PCS | Mod: CPTII,S$GLB,, | Performed by: OBSTETRICS & GYNECOLOGY

## 2019-04-15 PROCEDURE — 99213 OFFICE O/P EST LOW 20 MIN: CPT | Mod: S$GLB,,, | Performed by: OBSTETRICS & GYNECOLOGY

## 2019-04-15 PROCEDURE — 1101F PR PT FALLS ASSESS DOC 0-1 FALLS W/OUT INJ PAST YR: ICD-10-PCS | Mod: CPTII,S$GLB,, | Performed by: OBSTETRICS & GYNECOLOGY

## 2019-04-15 PROCEDURE — 99999 PR PBB SHADOW E&M-EST. PATIENT-LVL III: CPT | Mod: PBBFAC,,, | Performed by: OBSTETRICS & GYNECOLOGY

## 2019-04-15 PROCEDURE — 3078F DIAST BP <80 MM HG: CPT | Mod: CPTII,S$GLB,, | Performed by: OBSTETRICS & GYNECOLOGY

## 2019-04-15 PROCEDURE — 3074F PR MOST RECENT SYSTOLIC BLOOD PRESSURE < 130 MM HG: ICD-10-PCS | Mod: CPTII,S$GLB,, | Performed by: OBSTETRICS & GYNECOLOGY

## 2019-04-15 PROCEDURE — 99999 PR PBB SHADOW E&M-EST. PATIENT-LVL III: ICD-10-PCS | Mod: PBBFAC,,, | Performed by: OBSTETRICS & GYNECOLOGY

## 2019-04-15 NOTE — PROGRESS NOTES
Subjective:       Patient ID: Radha Morales is a 78 y.o. female.    Chief Complaint:  Follow-up (Pessary insertion)      History of Present Illness  HPI  Patient comes in today for follow-up  Seen on 2019 with abrasion from pessary.  Had only been using estrogen cream once a week  Pessary left out.  Estrogen cream nightly for one week.  Denies further bleeding or brown discharge    GYN & OB History  No LMP recorded. Patient is postmenopausal.   Date of Last Pap: No result found    OB History    Para Term  AB Living   7 7 7 0 0 7   SAB TAB Ectopic Multiple Live Births                  # Outcome Date GA Lbr Kunal/2nd Weight Sex Delivery Anes PTL Lv   7 Term            6 Term            5 Term            4 Term            3 Term            2 Term            1 Term              Past Medical History:   Diagnosis Date    Arthritis     Diabetes mellitus     Hypertension        Past Surgical History:   Procedure Laterality Date    COLONOSCOPY N/A 3/31/2017    [facility]    HYSTERECTOMY      OVARY SURGERY         Family History   Problem Relation Age of Onset    Diabetes Father     Diabetes Mother        Social History     Socioeconomic History    Marital status:      Spouse name: Not on file    Number of children: Not on file    Years of education: Not on file    Highest education level: Not on file   Occupational History    Not on file   Social Needs    Financial resource strain: Not on file    Food insecurity:     Worry: Not on file     Inability: Not on file    Transportation needs:     Medical: Not on file     Non-medical: Not on file   Tobacco Use    Smoking status: Never Smoker    Smokeless tobacco: Never Used   Substance and Sexual Activity    Alcohol use: No    Drug use: No    Sexual activity: Never     Birth control/protection: None   Lifestyle    Physical activity:     Days per week: Not on file     Minutes per session: Not on  file    Stress: Not on file   Relationships    Social connections:     Talks on phone: Not on file     Gets together: Not on file     Attends Yazidi service: Not on file     Active member of club or organization: Not on file     Attends meetings of clubs or organizations: Not on file     Relationship status: Not on file   Other Topics Concern    Not on file   Social History Narrative    .  .    Ex-     Lives alone       Current Outpatient Medications   Medication Sig Dispense Refill    aspirin (ECOTRIN) 81 MG EC tablet Take 81 mg by mouth once daily.      chlorthalidone (HYGROTEN) 25 MG Tab Take 25 mg by mouth once daily.      estradiol (ESTRACE) 0.01 % (0.1 mg/gram) vaginal cream Place 1 g vaginally once daily. 42.5 g 1    latanoprost 0.005 % ophthalmic solution INSTILL 1 DROP INTO BOTH EYES QHS  3    losartan (COZAAR) 100 MG tablet TK 1 T PO D  0    meloxicam (MOBIC) 7.5 MG tablet Take 7.5 mg by mouth once daily.      metformin (GLUCOPHAGE) 1000 MG tablet Take 1,000 mg by mouth 2 (two) times daily with meals.      metroNIDAZOLE (FLAGYL) 500 MG tablet Take 1 tablet (500 mg total) by mouth every 12 (twelve) hours. for 7 days 14 tablet 0    multivit,iron,minerals/lutein (CENTRUM SILVER ULTRA WOMEN'S ORAL) Take by mouth.       No current facility-administered medications for this visit.        Review of patient's allergies indicates:  No Known Allergies    Review of Systems  Review of Systems   Constitutional: Negative for activity change, appetite change, chills, fatigue, fever and unexpected weight change.   HENT: Negative for mouth sores.         Hard of hearing   Respiratory: Negative for cough, shortness of breath and wheezing.    Cardiovascular: Negative for chest pain and palpitations.   Gastrointestinal: Negative for abdominal pain, bloating, blood in stool, constipation, nausea and vomiting.   Endocrine: Negative for diabetes and hot flashes.   Genitourinary:  "Negative for dysmenorrhea, dyspareunia, dysuria, frequency, hematuria, menorrhagia, menstrual problem, pelvic pain, urgency, vaginal bleeding, vaginal discharge, vaginal pain, urinary incontinence, postcoital bleeding and vaginal odor.   Musculoskeletal: Negative for back pain and myalgias.   Integumentary:  Negative for rash, breast mass and nipple discharge.   Neurological: Negative for seizures and headaches.   Psychiatric/Behavioral: Negative for depression and sleep disturbance. The patient is not nervous/anxious.    Breast: Negative for mass, mastodynia and nipple discharge          Objective:    Physical Exam:   Constitutional: She appears well-developed and well-nourished. No distress.    HENT:   Head: Normocephalic and atraumatic.    Eyes: EOM are normal.    Neck: Normal range of motion.     Pulmonary/Chest: Effort normal. No respiratory distress.        Abdominal: Soft. She exhibits no distension. There is no tenderness. There is no rebound and no guarding.   Vertical scar      Genitourinary: Vagina normal. No vaginal discharge found.   Genitourinary Comments: Significant atrophy.  Vulva without any obvious lesions.  Vaginal vault with prolapse to introitus.  Moderate brownish red discharge noted.  No obvious lesion.  Vagina without abrasion/ulcerations.  Cervix visible. Bleeding/friable area at around 0700 into the vagina. Adnexa is without any masses or tenderness.           Musculoskeletal: Normal range of motion.       Neurological: She is alert.    Skin: Skin is warm and dry.    Psychiatric: She has a normal mood and affect.          Assessment:        1. Pessary maintenance    2. Atrophic vaginitis    3. Menopause    4.  ?supracervical hysterectomy?         Plan:      I have extensively discussed with the patient regarding her condition  I would like to biopsy the bleeding area.  Does not seem to be bleeding from upper cervix  "But Dr Marlo Singh did all of that already!"    Again, request " records from Dr Singh  She will continue using estrogen cream every night for the next 2 weeks, then 3 times a week  Back in 3 weeks.

## 2019-04-17 ENCOUNTER — TELEPHONE (OUTPATIENT)
Dept: OBSTETRICS AND GYNECOLOGY | Facility: CLINIC | Age: 79
End: 2019-04-17

## 2019-04-17 NOTE — TELEPHONE ENCOUNTER
----- Message from Nicol Ledesma sent at 4/16/2019 11:22 AM CDT -----  Contact: self - [phone]  Type: Patient Call Back    Who called:pt    What is the request in detail: Pt states she spoke to staff yesterday and was supposed to receive a call back that she has not gotten. Please contact back at earliest convenience.     Can the clinic reply by [facility]? No    Would the patient rather a call back or a response via [facility]? Call back     Best call back number:[phone]      Patient provided new fax number for Dr Sellers office [phone]

## 2019-05-05 DIAGNOSIS — Z46.89 PESSARY MAINTENANCE: ICD-10-CM

## 2019-05-05 DIAGNOSIS — Z78.0 MENOPAUSE: ICD-10-CM

## 2019-05-06 ENCOUNTER — OFFICE VISIT (OUTPATIENT)
Dept: OBSTETRICS AND GYNECOLOGY | Facility: CLINIC | Age: 79
End: 2019-05-06
Payer: MEDICARE

## 2019-05-06 VITALS
HEIGHT: 65 IN | SYSTOLIC BLOOD PRESSURE: 120 MMHG | BODY MASS INDEX: 28.95 KG/M2 | WEIGHT: 173.75 LBS | DIASTOLIC BLOOD PRESSURE: 60 MMHG

## 2019-05-06 DIAGNOSIS — N95.2 ATROPHIC VAGINITIS: ICD-10-CM

## 2019-05-06 DIAGNOSIS — Z46.89 PESSARY MAINTENANCE: Primary | ICD-10-CM

## 2019-05-06 DIAGNOSIS — Z78.0 MENOPAUSE: ICD-10-CM

## 2019-05-06 PROCEDURE — 99213 PR OFFICE/OUTPT VISIT, EST, LEVL III, 20-29 MIN: ICD-10-PCS | Mod: S$GLB,,, | Performed by: OBSTETRICS & GYNECOLOGY

## 2019-05-06 PROCEDURE — 3078F PR MOST RECENT DIASTOLIC BLOOD PRESSURE < 80 MM HG: ICD-10-PCS | Mod: CPTII,S$GLB,, | Performed by: OBSTETRICS & GYNECOLOGY

## 2019-05-06 PROCEDURE — 99213 OFFICE O/P EST LOW 20 MIN: CPT | Mod: S$GLB,,, | Performed by: OBSTETRICS & GYNECOLOGY

## 2019-05-06 PROCEDURE — 99999 PR PBB SHADOW E&M-EST. PATIENT-LVL III: CPT | Mod: PBBFAC,,, | Performed by: OBSTETRICS & GYNECOLOGY

## 2019-05-06 PROCEDURE — 1101F PR PT FALLS ASSESS DOC 0-1 FALLS W/OUT INJ PAST YR: ICD-10-PCS | Mod: CPTII,S$GLB,, | Performed by: OBSTETRICS & GYNECOLOGY

## 2019-05-06 PROCEDURE — 3074F SYST BP LT 130 MM HG: CPT | Mod: CPTII,S$GLB,, | Performed by: OBSTETRICS & GYNECOLOGY

## 2019-05-06 PROCEDURE — 99999 PR PBB SHADOW E&M-EST. PATIENT-LVL III: ICD-10-PCS | Mod: PBBFAC,,, | Performed by: OBSTETRICS & GYNECOLOGY

## 2019-05-06 PROCEDURE — 1101F PT FALLS ASSESS-DOCD LE1/YR: CPT | Mod: CPTII,S$GLB,, | Performed by: OBSTETRICS & GYNECOLOGY

## 2019-05-06 PROCEDURE — 3078F DIAST BP <80 MM HG: CPT | Mod: CPTII,S$GLB,, | Performed by: OBSTETRICS & GYNECOLOGY

## 2019-05-06 PROCEDURE — 3074F PR MOST RECENT SYSTOLIC BLOOD PRESSURE < 130 MM HG: ICD-10-PCS | Mod: CPTII,S$GLB,, | Performed by: OBSTETRICS & GYNECOLOGY

## 2019-05-06 RX ORDER — ESTRADIOL 0.1 MG/G
CREAM VAGINAL
Qty: 42.5 G | Refills: 2 | Status: SHIPPED | OUTPATIENT
Start: 2019-05-06 | End: 2019-08-13 | Stop reason: SDUPTHER

## 2019-05-06 RX ORDER — ESTRADIOL 0.1 MG/G
CREAM VAGINAL
Qty: 42.5 G | Refills: 2 | Status: SHIPPED | OUTPATIENT
Start: 2019-05-06 | End: 2019-05-06 | Stop reason: SDUPTHER

## 2019-05-06 NOTE — PROGRESS NOTES
Subjective:       Patient ID: Radha Morales is a 78 y.o. female.    Chief Complaint:  Follow-up      History of Present Illness  HPI  Patient comes in today for follow-up and pessary cleaning.  Has been seeing us for the past several months for pessary care  Had some spotting/erosion last visit.  Estrogen cream increased to daily for the past several weeks.    Feeling better today.  Would like to continue using the pessary.    No other complaint.    GYN & OB History  No LMP recorded. Patient is postmenopausal.   Date of Last Pap: No result found    OB History    Para Term  AB Living   7 7 7 0 0 7   SAB TAB Ectopic Multiple Live Births                  # Outcome Date GA Lbr Kunal/2nd Weight Sex Delivery Anes PTL Lv   7 Term            6 Term            5 Term            4 Term            3 Term            2 Term            1 Term              Past Medical History:   Diagnosis Date    Arthritis     Diabetes mellitus     Hypertension        Past Surgical History:   Procedure Laterality Date    COLONOSCOPY N/A 3/31/2017    [facility]formed [facility] at [facility] ENDO    HYSTERECTOMY      OVARY SURGERY         Family History   Problem Relation Age of Onset    Diabetes Father     Diabetes Mother        Social History     Socioeconomic History    Marital status:      Spouse name: Not on file    Number of children: Not on file    Years of education: Not on file    Highest education level: Not on file   Occupational History    Not on file   Social Needs    Financial resource strain: Not on file    Food insecurity:     Worry: Not on file     Inability: Not on file    Transportation needs:     Medical: Not on file     Non-medical: Not on file   Tobacco Use    Smoking status: Never Smoker    Smokeless tobacco: Never Used   Substance and Sexual Activity    Alcohol use: No    Drug use: No    Sexual activity: Never     Birth control/protection: None   Lifestyle    Physical  activity:     Days per week: Not on file     Minutes per session: Not on file    Stress: Not on file   Relationships    Social connections:     Talks on phone: Not on file     Gets together: Not on file     Attends Church service: Not on file     Active member of club or organization: Not on file     Attends meetings of clubs or organizations: Not on file     Relationship status: Not on file   Other Topics Concern    Not on file   Social History Narrative    .  .    Ex-     Lives alone       Current Outpatient Medications   Medication Sig Dispense Refill    aspirin (ECOTRIN) 81 MG EC tablet Take 81 mg by mouth once daily.      chlorthalidone (HYGROTEN) 25 MG Tab Take 25 mg by mouth once daily.      estradiol (ESTRACE) 0.01 % (0.1 mg/gram) vaginal cream PLACE 1 GRAM VAGINALLY EVERY DAY 42.5 g 2    latanoprost 0.005 % ophthalmic solution INSTILL 1 DROP INTO BOTH EYES QHS  3    losartan (COZAAR) 100 MG tablet TK 1 T PO D  0    meloxicam (MOBIC) 7.5 MG tablet Take 7.5 mg by mouth once daily.      metformin (GLUCOPHAGE) 1000 MG tablet Take 1,000 mg by mouth 2 (two) times daily with meals.      multivit,iron,minerals/lutein (CENTRUM SILVER ULTRA WOMEN'S ORAL) Take by mouth.       No current facility-administered medications for this visit.        Review of patient's allergies indicates:  No Known Allergies    Review of Systems  Review of Systems   Constitutional: Negative for activity change, appetite change, chills, fatigue, fever and unexpected weight change.   HENT: Negative for mouth sores.         Hard of hearing   Respiratory: Negative for cough, shortness of breath and wheezing.    Cardiovascular: Negative for chest pain and palpitations.   Gastrointestinal: Negative for abdominal pain, bloating, blood in stool, constipation, nausea and vomiting.   Endocrine: Negative for diabetes and hot flashes.   Genitourinary: Negative for dysmenorrhea, dyspareunia, dysuria,  frequency, hematuria, menorrhagia, menstrual problem, pelvic pain, urgency, vaginal bleeding, vaginal discharge, vaginal pain, urinary incontinence, postcoital bleeding and vaginal odor.   Musculoskeletal: Negative for back pain and myalgias.   Integumentary:  Negative for rash, breast mass and nipple discharge.   Neurological: Negative for seizures and headaches.   Psychiatric/Behavioral: Negative for depression and sleep disturbance. The patient is not nervous/anxious.    Breast: Negative for mass, mastodynia and nipple discharge          Objective:    Physical Exam:   Constitutional: She appears well-developed and well-nourished. No distress.    HENT:   Head: Normocephalic and atraumatic.    Eyes: EOM are normal.    Neck: Normal range of motion.     Pulmonary/Chest: Effort normal. No respiratory distress.        Abdominal: Soft. She exhibits no distension. There is no tenderness. There is no rebound and no guarding.   Vertical scar      Genitourinary: Vagina normal. No vaginal discharge found.   Genitourinary Comments: Moderate atrophy.  Vulva without any obvious lesions.  Vaginal vault with prolapse to introitus.  Moderate brownish discharge noted.  No obvious lesion.  Vagina without abrasion/ulcerations.  Cervix visible with portio externa somewhat friable. Adnexa is without any masses or tenderness.           Musculoskeletal: Normal range of motion.       Neurological: She is alert.    Skin: Skin is warm and dry.    Psychiatric: She has a normal mood and affect.          Assessment:        1. Pessary maintenance    2. Atrophic vaginitis              Plan:      I have discussed with the patient regarding her condition  Pessary removed to be cleaned and replaced.  She will continue with estrogen cream nightly., then slow down to three times a week.    Back in 3-4 weeks.    Consider Trimosan 3 times a week.

## 2019-06-18 ENCOUNTER — OFFICE VISIT (OUTPATIENT)
Dept: OBSTETRICS AND GYNECOLOGY | Facility: CLINIC | Age: 79
End: 2019-06-18
Payer: MEDICARE

## 2019-06-18 VITALS
WEIGHT: 173.5 LBS | SYSTOLIC BLOOD PRESSURE: 124 MMHG | BODY MASS INDEX: 28.91 KG/M2 | HEIGHT: 65 IN | DIASTOLIC BLOOD PRESSURE: 76 MMHG

## 2019-06-18 DIAGNOSIS — Z46.89 PESSARY MAINTENANCE: Primary | ICD-10-CM

## 2019-06-18 DIAGNOSIS — N95.2 ATROPHIC VAGINITIS: ICD-10-CM

## 2019-06-18 DIAGNOSIS — Z78.0 MENOPAUSE: ICD-10-CM

## 2019-06-18 PROCEDURE — 99213 OFFICE O/P EST LOW 20 MIN: CPT | Mod: S$GLB,,, | Performed by: OBSTETRICS & GYNECOLOGY

## 2019-06-18 PROCEDURE — 3078F PR MOST RECENT DIASTOLIC BLOOD PRESSURE < 80 MM HG: ICD-10-PCS | Mod: CPTII,S$GLB,, | Performed by: OBSTETRICS & GYNECOLOGY

## 2019-06-18 PROCEDURE — 99999 PR PBB SHADOW E&M-EST. PATIENT-LVL III: ICD-10-PCS | Mod: PBBFAC,,, | Performed by: OBSTETRICS & GYNECOLOGY

## 2019-06-18 PROCEDURE — 3074F SYST BP LT 130 MM HG: CPT | Mod: CPTII,S$GLB,, | Performed by: OBSTETRICS & GYNECOLOGY

## 2019-06-18 PROCEDURE — 3074F PR MOST RECENT SYSTOLIC BLOOD PRESSURE < 130 MM HG: ICD-10-PCS | Mod: CPTII,S$GLB,, | Performed by: OBSTETRICS & GYNECOLOGY

## 2019-06-18 PROCEDURE — 1101F PT FALLS ASSESS-DOCD LE1/YR: CPT | Mod: CPTII,S$GLB,, | Performed by: OBSTETRICS & GYNECOLOGY

## 2019-06-18 PROCEDURE — 1101F PR PT FALLS ASSESS DOC 0-1 FALLS W/OUT INJ PAST YR: ICD-10-PCS | Mod: CPTII,S$GLB,, | Performed by: OBSTETRICS & GYNECOLOGY

## 2019-06-18 PROCEDURE — 99999 PR PBB SHADOW E&M-EST. PATIENT-LVL III: CPT | Mod: PBBFAC,,, | Performed by: OBSTETRICS & GYNECOLOGY

## 2019-06-18 PROCEDURE — 3078F DIAST BP <80 MM HG: CPT | Mod: CPTII,S$GLB,, | Performed by: OBSTETRICS & GYNECOLOGY

## 2019-06-18 PROCEDURE — 99213 PR OFFICE/OUTPT VISIT, EST, LEVL III, 20-29 MIN: ICD-10-PCS | Mod: S$GLB,,, | Performed by: OBSTETRICS & GYNECOLOGY

## 2019-06-18 RX ORDER — ROSUVASTATIN CALCIUM 5 MG/1
TABLET, COATED ORAL
Refills: 3 | COMMUNITY
Start: 2019-05-15

## 2019-06-18 NOTE — PROGRESS NOTES
Subjective:       Patient ID: Radha Morales is a 78 y.o. female.    Chief Complaint:  Pessary Maintenance (Follow up pessary cleaning.)      History of Present Illness  HPI  Patient comes in today for follow-up and pessary cleaning.  Has been seeing us for the past several months for pessary care  Had some spotting/erosion previous visit in 2019  Estrogen cream increased to daily for the past several weeks.  No abrasion in May 2019  Now on Premarin cream twice a week.     Feeling better today.  Would like to continue using the pessary.     No other complaint.    Status post supracervical abdominal hysterectomy by Dr GUEVARA Singh.    GYN & OB History  No LMP recorded. Patient is postmenopausal.   Date of Last Pap: No result found    OB History    Para Term  AB Living   7 7 7 0 0 7   SAB TAB Ectopic Multiple Live Births                  # Outcome Date GA Lbr Kunal/2nd Weight Sex Delivery Anes PTL Lv   7 Term            6 Term            5 Term            4 Term            3 Term            2 Term            1 Term              Past Medical History:   Diagnosis Date    Arthritis     Diabetes mellitus     Hypertension        Past Surgical History:   Procedure Laterality Date    COLONOSCOPY N/A 3/31/2017    [facility]    HYSTERECTOMY      OVARY SURGERY         Family History   Problem Relation Age of Onset    Diabetes Father     Diabetes Mother        Social History     Socioeconomic History    Marital status:      Spouse name: Not on file    Number of children: Not on file    Years of education: Not on file    Highest education level: Not on file   Occupational History    Not on file   Social Needs    Financial resource strain: Not on file    Food insecurity:     Worry: Not on file     Inability: Not on file    Transportation needs:     Medical: Not on file     Non-medical: Not on file   Tobacco Use    Smoking status: Never Smoker     Smokeless tobacco: Never Used   Substance and Sexual Activity    Alcohol use: No    Drug use: No    Sexual activity: Never     Birth control/protection: None   Lifestyle    Physical activity:     Days per week: Not on file     Minutes per session: Not on file    Stress: Not on file   Relationships    Social connections:     Talks on phone: Not on file     Gets together: Not on file     Attends [employer] service: Not on file     Active member of club or organization: Not on file     Attends meetings of clubs or organizations: Not on file     Relationship status: Not on file   Other Topics Concern    Not on file   Social History Narrative    .  .    Ex-     Lives alone       Current Outpatient Medications   Medication Sig Dispense Refill    aspirin (ECOTRIN) 81 MG EC tablet Take 81 mg by mouth once daily.      chlorthalidone (HYGROTEN) 25 MG Tab Take 25 mg by mouth once daily.      estradiol (ESTRACE) 0.01 % (0.1 mg/gram) vaginal cream PLACE 1 GRAM VAGINALLY EVERY DAY 42.5 g 2    latanoprost 0.005 % ophthalmic solution INSTILL 1 DROP INTO BOTH EYES QHS  3    losartan (COZAAR) 100 MG tablet TK 1 T PO D  0    meloxicam (MOBIC) 7.5 MG tablet Take 7.5 mg by mouth once daily.      metformin (GLUCOPHAGE) 1000 MG tablet Take 1,000 mg by mouth 2 (two) times daily with meals.      multivit,iron,minerals/lutein (CENTRUM SILVER ULTRA WOMEN'S ORAL) Take by mouth.      rosuvastatin (CRESTOR) 5 MG tablet   3     No current facility-administered medications for this visit.        Review of patient's allergies indicates:  No Known Allergies    Review of Systems  Review of Systems   Constitutional: Negative for activity change, appetite change, chills, fatigue, fever and unexpected weight change.   HENT: Negative for mouth sores.    Respiratory: Negative for cough, shortness of breath and wheezing.    Cardiovascular: Negative for chest pain and palpitations.   Gastrointestinal: Negative  for abdominal pain, bloating, blood in stool, constipation, nausea and vomiting.   Endocrine: Negative for diabetes and hot flashes.   Genitourinary: Positive for vaginal discharge and vaginal odor. Negative for dysmenorrhea, dyspareunia, dysuria, frequency, hematuria, menorrhagia, menstrual problem, pelvic pain, urgency, vaginal bleeding, vaginal pain, urinary incontinence and postcoital bleeding.        Vaginal vault prolapse   Musculoskeletal: Negative for back pain and myalgias.   Integumentary:  Negative for rash, breast mass and nipple discharge.   Neurological: Negative for seizures and headaches.   Psychiatric/Behavioral: Negative for depression and sleep disturbance. The patient is not nervous/anxious.    Breast: Negative for mass, mastodynia and nipple discharge          Objective:    Physical Exam:   Constitutional: She appears well-developed and well-nourished. No distress.    HENT:   Head: Normocephalic and atraumatic.    Eyes: EOM are normal.    Neck: Normal range of motion.     Pulmonary/Chest: Effort normal. No respiratory distress.        Abdominal: Soft. She exhibits no distension. There is no tenderness. There is no rebound and no guarding.   Vertical scar      Genitourinary: Vagina normal. No vaginal discharge found.   Genitourinary Comments: Significant atrophy.  Vulva without any obvious lesions.  Vaginal vault with prolapse to introitus.  Moderate brownish red discharge noted.  No obvious lesion.  Vagina without abrasion/ulcerations.  Cervix visible. Minimal bleeding/friable area at around 0700 into the vagina. Adnexa is without any masses or tenderness.           Musculoskeletal: Normal range of motion.       Neurological: She is alert.    Skin: Skin is warm and dry.    Psychiatric: She has a normal mood and affect.          Assessment:        1. Pessary maintenance    2. Atrophic vaginitis    3. Menopause              Plan:      I have discussed with the patient regarding her  condition  Pessary removed, cleaned, and replaced.  Premarin cream applied to the leading edge of the device.    Discussed with patient possible colpocleisis.  She will think about it and let us know.     Back in 4 weeks

## 2019-08-13 ENCOUNTER — OFFICE VISIT (OUTPATIENT)
Dept: OBSTETRICS AND GYNECOLOGY | Facility: CLINIC | Age: 79
End: 2019-08-13
Payer: MEDICARE

## 2019-08-13 VITALS
BODY MASS INDEX: 28.14 KG/M2 | DIASTOLIC BLOOD PRESSURE: 70 MMHG | SYSTOLIC BLOOD PRESSURE: 142 MMHG | HEIGHT: 65 IN | WEIGHT: 168.88 LBS

## 2019-08-13 DIAGNOSIS — Z46.89 PESSARY MAINTENANCE: ICD-10-CM

## 2019-08-13 DIAGNOSIS — N99.3 VAGINAL VAULT PROLAPSE AFTER HYSTERECTOMY: Primary | ICD-10-CM

## 2019-08-13 DIAGNOSIS — Z78.0 MENOPAUSE: ICD-10-CM

## 2019-08-13 DIAGNOSIS — I10 ESSENTIAL HYPERTENSION: ICD-10-CM

## 2019-08-13 DIAGNOSIS — E11.9 DIABETES MELLITUS TYPE 2 IN NONOBESE: ICD-10-CM

## 2019-08-13 PROCEDURE — 3078F PR MOST RECENT DIASTOLIC BLOOD PRESSURE < 80 MM HG: ICD-10-PCS | Mod: CPTII,S$GLB,, | Performed by: OBSTETRICS & GYNECOLOGY

## 2019-08-13 PROCEDURE — 1101F PT FALLS ASSESS-DOCD LE1/YR: CPT | Mod: CPTII,S$GLB,, | Performed by: OBSTETRICS & GYNECOLOGY

## 2019-08-13 PROCEDURE — 3077F PR MOST RECENT SYSTOLIC BLOOD PRESSURE >= 140 MM HG: ICD-10-PCS | Mod: CPTII,S$GLB,, | Performed by: OBSTETRICS & GYNECOLOGY

## 2019-08-13 PROCEDURE — 3078F DIAST BP <80 MM HG: CPT | Mod: CPTII,S$GLB,, | Performed by: OBSTETRICS & GYNECOLOGY

## 2019-08-13 PROCEDURE — 99213 PR OFFICE/OUTPT VISIT, EST, LEVL III, 20-29 MIN: ICD-10-PCS | Mod: S$GLB,,, | Performed by: OBSTETRICS & GYNECOLOGY

## 2019-08-13 PROCEDURE — 3077F SYST BP >= 140 MM HG: CPT | Mod: CPTII,S$GLB,, | Performed by: OBSTETRICS & GYNECOLOGY

## 2019-08-13 PROCEDURE — 99999 PR PBB SHADOW E&M-EST. PATIENT-LVL III: CPT | Mod: PBBFAC,,, | Performed by: OBSTETRICS & GYNECOLOGY

## 2019-08-13 PROCEDURE — 1101F PR PT FALLS ASSESS DOC 0-1 FALLS W/OUT INJ PAST YR: ICD-10-PCS | Mod: CPTII,S$GLB,, | Performed by: OBSTETRICS & GYNECOLOGY

## 2019-08-13 PROCEDURE — 99213 OFFICE O/P EST LOW 20 MIN: CPT | Mod: S$GLB,,, | Performed by: OBSTETRICS & GYNECOLOGY

## 2019-08-13 PROCEDURE — 99999 PR PBB SHADOW E&M-EST. PATIENT-LVL III: ICD-10-PCS | Mod: PBBFAC,,, | Performed by: OBSTETRICS & GYNECOLOGY

## 2019-08-13 RX ORDER — ESTRADIOL 0.1 MG/G
CREAM VAGINAL
Qty: 42.5 G | Refills: 2 | Status: SHIPPED | OUTPATIENT
Start: 2019-08-13 | End: 2019-11-18 | Stop reason: SDUPTHER

## 2019-08-13 RX ORDER — AMMONIUM LACTATE 12 G/100G
CREAM TOPICAL
Refills: 10 | COMMUNITY
Start: 2019-07-01

## 2019-08-13 NOTE — PATIENT INSTRUCTIONS
You are scheduled for removal of cervix and repair of vaginal prolapse on 9/16/2019  Please come back on 9/9/2019 for preop  Continue to use estrogen cream  Go to Patient Registration to schedule your EKG now.

## 2019-08-14 NOTE — PROGRESS NOTES
Subjective:       Patient ID: Radha Morales is a 78 y.o. female.    Chief Complaint:  Pessary maintenance (Pt here for pessary cleaning)      History of Present Illness  HPI  Patient comes in today for follow-up and pessary cleaning.  Has been seeing us for the past several months for pessary care  Had some spotting/erosion previous visit in 2019 and 2019  Estrogen cream increased to daily for several weeks in April/May.  No abrasion in May 2019  Now on Premarin cream twice a week.     Again with brown odorous discharge.  Would like to get surgery if it would fix the problem.     No other complaint.     Status post supracervical abdominal hysterectomy by Dr GUEVARA Singh.    GYN & OB History  No LMP recorded. Patient is postmenopausal.   Date of Last Pap: No result found    OB History    Para Term  AB Living   7 7 7 0 0 7   SAB TAB Ectopic Multiple Live Births                  # Outcome Date GA Lbr Kunal/2nd Weight Sex Delivery Anes PTL Lv   7 Term            6 Term            5 Term            4 Term            3 Term            2 Term            1 Term              Past Medical History:   Diagnosis Date    Arthritis     Diabetes mellitus     Hypertension        Past Surgical History:   Procedure Laterality Date    COLONOSCOPY N/A 3/31/2017    [facility]formed [facility] ENDO    HYSTERECTOMY      OVARY SURGERY         Family History   Problem Relation Age of Onset    Diabetes Father     Diabetes Mother        Social History     Socioeconomic History    Marital status:      Spouse name: Not on file    Number of children: Not on file    Years of education: Not on file    Highest education level: Not on file   Occupational History    Not on file   Social Needs    Financial resource strain: Not on file    Food insecurity:     Worry: Not on file     Inability: Not on file    Transportation needs:     Medical: Not on file     Non-medical: Not on file   Tobacco  Use    Smoking status: Never Smoker    Smokeless tobacco: Never Used   Substance and Sexual Activity    Alcohol use: No    Drug use: No    Sexual activity: Never     Birth control/protection: None   Lifestyle    Physical activity:     Days per week: Not on file     Minutes per session: Not on file    Stress: Not on file   Relationships    Social connections:     Talks on phone: Not on file     Gets together: Not on file     Attends [employer] service: Not on file     Active member of club or organization: Not on file     Attends meetings of clubs or organizations: Not on file     Relationship status: Not on file   Other Topics Concern    Not on file   Social History Narrative    .  .    Ex-     Lives alone       Current Outpatient Medications   Medication Sig Dispense Refill    ammonium lactate 12 % Crea   10    aspirin (ECOTRIN) 81 MG EC tablet Take 81 mg by mouth once daily.      chlorthalidone (HYGROTEN) 25 MG Tab Take 25 mg by mouth once daily.      estradiol (ESTRACE) 0.01 % (0.1 mg/gram) vaginal cream PLACE 1 GRAM VAGINALLY EVERY DAY 42.5 g 2    latanoprost 0.005 % ophthalmic solution INSTILL 1 DROP INTO BOTH EYES QHS  3    losartan (COZAAR) 100 MG tablet TK 1 T PO D  0    meloxicam (MOBIC) 7.5 MG tablet Take 7.5 mg by mouth once daily.      metformin (GLUCOPHAGE) 1000 MG tablet Take 1,000 mg by mouth 2 (two) times daily with meals.      multivit,iron,minerals/lutein (CENTRUM SILVER ULTRA WOMEN'S ORAL) Take by mouth.      rosuvastatin (CRESTOR) 5 MG tablet   3     No current facility-administered medications for this visit.        Review of patient's allergies indicates:  No Known Allergies      Review of Systems  Review of Systems   Constitutional: Negative for activity change, appetite change, chills, fatigue, fever and unexpected weight change.   HENT: Negative for mouth sores.    Respiratory: Negative for cough, shortness of breath and wheezing.     Cardiovascular: Negative for chest pain and palpitations.   Gastrointestinal: Negative for abdominal pain, bloating, blood in stool, constipation, nausea and vomiting.   Endocrine: Negative for diabetes and hot flashes.   Genitourinary: Positive for vaginal discharge and vaginal odor. Negative for dysmenorrhea, dyspareunia, dysuria, frequency, hematuria, menorrhagia, menstrual problem, pelvic pain, urgency, vaginal bleeding, vaginal pain, urinary incontinence and postcoital bleeding.        Vaginal vault prolapse   Musculoskeletal: Negative for back pain and myalgias.   Integumentary:  Negative for rash, breast mass and nipple discharge.   Neurological: Negative for seizures and headaches.   Psychiatric/Behavioral: Negative for depression and sleep disturbance. The patient is not nervous/anxious.    Breast: Negative for mass, mastodynia and nipple discharge          Objective:    Physical Exam:   Constitutional: She appears well-developed and well-nourished. No distress.    HENT:   Head: Normocephalic and atraumatic.    Eyes: EOM are normal.    Neck: Normal range of motion.     Pulmonary/Chest: Effort normal. No respiratory distress.        Abdominal: Soft. She exhibits no distension. There is no tenderness. There is no rebound and no guarding.   Vertical scar      Genitourinary: Vagina normal. No vaginal discharge found.   Genitourinary Comments: Significant atrophy.  Vulva without any obvious lesions.  Vaginal vault with prolapse to introitus.  Moderate brownish red discharge noted.  No obvious lesion.  Vagina with abrasion/ulcerations again on right fornix and posterior to cervix.  Cervix visible. Bleeding/friable area at around 0700 to 0500 into the vagina. Adnexa is without any masses or tenderness.           Musculoskeletal: Normal range of motion.       Neurological: She is alert.    Skin: Skin is warm and dry.    Psychiatric: She has a normal mood and affect.          Assessment:        1. Vaginal vault  prolapse after hysterectomy    2. Pessary maintenance    3. Menopause    4. Essential hypertension    5. Diabetes mellitus type 2 in nonobese              Plan:      I have discussed with the patient regarding her condition  She could not use the pessary even with vaginal estrogen cream.  As per our previous discussion, she would like to get surgery  We will try to perform trachelectomy with culdoplasty.  We also discussed colpocleisis.   Risks and benefits discussed.  Surgery scheduled for 9/16/2019 at 0730  She will be back for preop on 9/9/2019

## 2019-08-26 ENCOUNTER — HOSPITAL ENCOUNTER (OUTPATIENT)
Dept: CARDIOLOGY | Facility: HOSPITAL | Age: 79
Discharge: HOME | End: 2019-08-26
Attending: OBSTETRICS & GYNECOLOGY
Payer: MEDICARE

## 2019-08-26 DIAGNOSIS — E11.9 DIABETES MELLITUS TYPE 2 IN NONOBESE: ICD-10-CM

## 2019-08-26 DIAGNOSIS — Z78.0 MENOPAUSE: ICD-10-CM

## 2019-08-26 DIAGNOSIS — N99.3 VAGINAL VAULT PROLAPSE AFTER HYSTERECTOMY: ICD-10-CM

## 2019-08-26 PROCEDURE — 93005 ELECTROCARDIOGRAM TRACING: CPT

## 2019-08-26 PROCEDURE — 93010 EKG 12-LEAD: ICD-10-PCS | Mod: ,,, | Performed by: INTERNAL MEDICINE

## 2019-08-26 PROCEDURE — 93010 ELECTROCARDIOGRAM REPORT: CPT | Mod: ,,, | Performed by: INTERNAL MEDICINE

## 2019-09-09 ENCOUNTER — OFFICE VISIT (OUTPATIENT)
Dept: OBSTETRICS AND GYNECOLOGY | Facility: CLINIC | Age: 79
End: 2019-09-09
Payer: MEDICARE

## 2019-09-09 ENCOUNTER — HOSPITAL ENCOUNTER (OUTPATIENT)
Dept: PREADMISSION TESTING | Facility: HOSPITAL | Age: 79
Discharge: HOME | End: 2019-09-09
Attending: OBSTETRICS & GYNECOLOGY
Payer: MEDICARE

## 2019-09-09 ENCOUNTER — HOSPITAL ENCOUNTER (OUTPATIENT)
Dept: RADIOLOGY | Facility: HOSPITAL | Age: 79
Discharge: HOME | End: 2019-09-09
Attending: OBSTETRICS & GYNECOLOGY
Payer: MEDICARE

## 2019-09-09 VITALS — HEIGHT: 65 IN | WEIGHT: 170.63 LBS | BODY MASS INDEX: 28.43 KG/M2

## 2019-09-09 VITALS
HEIGHT: 65 IN | WEIGHT: 170.63 LBS | DIASTOLIC BLOOD PRESSURE: 64 MMHG | BODY MASS INDEX: 28.43 KG/M2 | SYSTOLIC BLOOD PRESSURE: 136 MMHG

## 2019-09-09 DIAGNOSIS — N95.2 ATROPHIC VAGINITIS: ICD-10-CM

## 2019-09-09 DIAGNOSIS — Z78.0 MENOPAUSE: ICD-10-CM

## 2019-09-09 DIAGNOSIS — N99.3 VAGINAL VAULT PROLAPSE AFTER HYSTERECTOMY: ICD-10-CM

## 2019-09-09 DIAGNOSIS — N99.3 VAGINAL VAULT PROLAPSE AFTER HYSTERECTOMY: Primary | ICD-10-CM

## 2019-09-09 DIAGNOSIS — I10 ESSENTIAL HYPERTENSION: ICD-10-CM

## 2019-09-09 DIAGNOSIS — Z01.818 PREOP TESTING: Primary | ICD-10-CM

## 2019-09-09 DIAGNOSIS — E11.9 DIABETES MELLITUS TYPE 2 IN NONOBESE: ICD-10-CM

## 2019-09-09 LAB
ALBUMIN SERPL BCP-MCNC: 4 G/DL (ref 3.5–5.2)
ALP SERPL-CCNC: 47 U/L (ref 55–135)
ALT SERPL W/O P-5'-P-CCNC: 13 U/L (ref 10–44)
ANION GAP SERPL CALC-SCNC: 8 MMOL/L (ref 8–16)
AST SERPL-CCNC: 19 U/L (ref 10–40)
BACTERIA #/AREA URNS HPF: NORMAL /HPF
BASOPHILS # BLD AUTO: 0.03 K/UL (ref 0–0.2)
BASOPHILS NFR BLD: 0.5 % (ref 0–1.9)
BILIRUB SERPL-MCNC: 0.6 MG/DL (ref 0.1–1)
BILIRUB UR QL STRIP: NEGATIVE
BUN SERPL-MCNC: 13 MG/DL (ref 8–23)
CALCIUM SERPL-MCNC: 10 MG/DL (ref 8.7–10.5)
CHLORIDE SERPL-SCNC: 101 MMOL/L (ref 95–110)
CLARITY UR: CLEAR
CO2 SERPL-SCNC: 27 MMOL/L (ref 23–29)
COLOR UR: ABNORMAL
CREAT SERPL-MCNC: 1 MG/DL (ref 0.5–1.4)
DIFFERENTIAL METHOD: ABNORMAL
EOSINOPHIL # BLD AUTO: 0.1 K/UL (ref 0–0.5)
EOSINOPHIL NFR BLD: 0.8 % (ref 0–8)
ERYTHROCYTE [DISTWIDTH] IN BLOOD BY AUTOMATED COUNT: 14.7 % (ref 11.5–14.5)
EST. GFR  (AFRICAN AMERICAN): >60 ML/MIN/1.73 M^2
EST. GFR  (NON AFRICAN AMERICAN): 54 ML/MIN/1.73 M^2
GLUCOSE SERPL-MCNC: 99 MG/DL (ref 70–110)
GLUCOSE UR QL STRIP: NEGATIVE
HCT VFR BLD AUTO: 36.2 % (ref 37–48.5)
HGB BLD-MCNC: 11.9 G/DL (ref 12–16)
HGB UR QL STRIP: NEGATIVE
KETONES UR QL STRIP: NEGATIVE
LEUKOCYTE ESTERASE UR QL STRIP: ABNORMAL
LYMPHOCYTES # BLD AUTO: 1.8 K/UL (ref 1–4.8)
LYMPHOCYTES NFR BLD: 27.7 % (ref 18–48)
MCH RBC QN AUTO: 28.7 PG (ref 27–31)
MCHC RBC AUTO-ENTMCNC: 32.9 G/DL (ref 32–36)
MCV RBC AUTO: 87 FL (ref 82–98)
MICROSCOPIC COMMENT: NORMAL
MONOCYTES # BLD AUTO: 0.6 K/UL (ref 0.3–1)
MONOCYTES NFR BLD: 9.3 % (ref 4–15)
NEUTROPHILS # BLD AUTO: 4 K/UL (ref 1.8–7.7)
NEUTROPHILS NFR BLD: 61.7 % (ref 38–73)
NITRITE UR QL STRIP: NEGATIVE
PH UR STRIP: 6 [PH] (ref 5–8)
PLATELET # BLD AUTO: 259 K/UL (ref 150–350)
PMV BLD AUTO: 9 FL (ref 9.2–12.9)
POTASSIUM SERPL-SCNC: 4.5 MMOL/L (ref 3.5–5.1)
PROT SERPL-MCNC: 8 G/DL (ref 6–8.4)
PROT UR QL STRIP: NEGATIVE
RBC # BLD AUTO: 4.14 M/UL (ref 4–5.4)
SODIUM SERPL-SCNC: 136 MMOL/L (ref 136–145)
SP GR UR STRIP: 1 (ref 1–1.03)
SQUAMOUS #/AREA URNS HPF: 2 /HPF
URN SPEC COLLECT METH UR: ABNORMAL
UROBILINOGEN UR STRIP-ACNC: NEGATIVE EU/DL
WBC # BLD AUTO: 6.53 K/UL (ref 3.9–12.7)
WBC #/AREA URNS HPF: 5 /HPF (ref 0–5)

## 2019-09-09 PROCEDURE — 85025 COMPLETE CBC W/AUTO DIFF WBC: CPT

## 2019-09-09 PROCEDURE — 80053 COMPREHEN METABOLIC PANEL: CPT

## 2019-09-09 PROCEDURE — 99499 NO LOS: ICD-10-PCS | Mod: S$GLB,,, | Performed by: OBSTETRICS & GYNECOLOGY

## 2019-09-09 PROCEDURE — 71046 X-RAY EXAM CHEST 2 VIEWS: CPT | Mod: TC,FY

## 2019-09-09 PROCEDURE — 99999 PR PBB SHADOW E&M-EST. PATIENT-LVL III: CPT | Mod: PBBFAC,,, | Performed by: OBSTETRICS & GYNECOLOGY

## 2019-09-09 PROCEDURE — 36415 COLL VENOUS BLD VENIPUNCTURE: CPT

## 2019-09-09 PROCEDURE — 81000 URINALYSIS NONAUTO W/SCOPE: CPT

## 2019-09-09 PROCEDURE — 99499 UNLISTED E&M SERVICE: CPT | Mod: S$GLB,,, | Performed by: OBSTETRICS & GYNECOLOGY

## 2019-09-09 PROCEDURE — 99999 PR PBB SHADOW E&M-EST. PATIENT-LVL III: ICD-10-PCS | Mod: PBBFAC,,, | Performed by: OBSTETRICS & GYNECOLOGY

## 2019-09-09 PROCEDURE — 83036 HEMOGLOBIN GLYCOSYLATED A1C: CPT

## 2019-09-09 PROCEDURE — 71046 XR CHEST PA AND LATERAL PRE-OP: ICD-10-PCS | Mod: 26,,, | Performed by: RADIOLOGY

## 2019-09-09 PROCEDURE — 71046 X-RAY EXAM CHEST 2 VIEWS: CPT | Mod: 26,,, | Performed by: RADIOLOGY

## 2019-09-09 RX ORDER — SODIUM CHLORIDE, SODIUM LACTATE, POTASSIUM CHLORIDE, CALCIUM CHLORIDE 600; 310; 30; 20 MG/100ML; MG/100ML; MG/100ML; MG/100ML
INJECTION, SOLUTION INTRAVENOUS CONTINUOUS
Status: CANCELLED | OUTPATIENT
Start: 2019-09-09

## 2019-09-09 NOTE — H&P (VIEW-ONLY)
Subjective:       Patient ID: Radha Morales is a 78 y.o. female.    Chief Complaint:  Pre-op Exam (Pre-op for Trachelectomy scheduled on 19)      History of Present Illness  HPI  Patient comes in today for preoperative consultation and examination.    Has been seeing us for the past several months for pessary care  Had some spotting/erosion previous visit in 2019 and 2019  Estrogen cream increased to daily for several weeks in April/May.  No abrasion in May 2019  On Premarin cream twice a week.  But still with erosion and bleeding and discharge.  Would like to get surgery if it would fix the problem.     No other complaint.     Status post supracervical abdominal hysterectomy by Dr GUEVARA Singh.  As per our previous discussion, she would get trachelectomy with culdoplasty.      GYN & OB History  No LMP recorded. Patient is postmenopausal.   Date of Last Pap: No result found    OB History    Para Term  AB Living   7 7 7 0 0 7   SAB TAB Ectopic Multiple Live Births                  # Outcome Date GA Lbr Kunal/2nd Weight Sex Delivery Anes PTL Lv   7 Term            6 Term            5 Term            4 Term            3 Term            2 Term            1 Term              Past Medical History:   Diagnosis Date    Arthritis     Diabetes mellitus     Hypertension        Past Surgical History:   Procedure Laterality Date    COLONOSCOPY N/A 3/31/2017    [facility]    HYSTERECTOMY      OVARY SURGERY         Family History   Problem Relation Age of Onset    Diabetes Father     Diabetes Mother        Social History     Socioeconomic History    Marital status:      Spouse name: Not on file    Number of children: Not on file    Years of education: Not on file    Highest education level: Not on file   Occupational History    Not on file   Social Needs    Financial resource strain: Not on file    Food insecurity:     Worry: Not on file      Inability: Not on file    Transportation needs:     Medical: Not on file     Non-medical: Not on file   Tobacco Use    Smoking status: Never Smoker    Smokeless tobacco: Never Used   Substance and Sexual Activity    Alcohol use: No    Drug use: No    Sexual activity: Never     Birth control/protection: None   Lifestyle    Physical activity:     Days per week: Not on file     Minutes per session: Not on file    Stress: Not on file   Relationships    Social connections:     Talks on phone: Not on file     Gets together: Not on file     Attends Christianity service: Not on file     Active member of club or organization: Not on file     Attends meetings of clubs or organizations: Not on file     Relationship status: Not on file   Other Topics Concern    Not on file   Social History Narrative    .  .    Ex-     Lives alone       Current Outpatient Medications   Medication Sig Dispense Refill    ammonium lactate 12 % Crea   10    aspirin (ECOTRIN) 81 MG EC tablet Take 81 mg by mouth once daily.      chlorthalidone (HYGROTEN) 25 MG Tab Take 25 mg by mouth once daily.      estradiol (ESTRACE) 0.01 % (0.1 mg/gram) vaginal cream PLACE 1 GRAM VAGINALLY EVERY DAY 42.5 g 2    latanoprost 0.005 % ophthalmic solution INSTILL 1 DROP INTO BOTH EYES QHS  3    losartan (COZAAR) 100 MG tablet TK 1 T PO D  0    meloxicam (MOBIC) 7.5 MG tablet Take 7.5 mg by mouth once daily.      metformin (GLUCOPHAGE) 1000 MG tablet Take 1,000 mg by mouth 2 (two) times daily with meals.      multivit,iron,minerals/lutein (CENTRUM SILVER ULTRA WOMEN'S ORAL) Take by mouth.      rosuvastatin (CRESTOR) 5 MG tablet   3     No current facility-administered medications for this visit.        Review of patient's allergies indicates:  No Known Allergies      Review of Systems  Review of Systems   Constitutional: Negative for activity change, appetite change, chills, fatigue, fever and unexpected weight change.    HENT: Negative for mouth sores.    Respiratory: Negative for cough, shortness of breath and wheezing.    Cardiovascular: Negative for chest pain and palpitations.   Gastrointestinal: Negative for abdominal pain, bloating, blood in stool, constipation, nausea and vomiting.   Endocrine: Negative for diabetes and hot flashes.   Genitourinary: Positive for vaginal discharge and vaginal odor. Negative for dysmenorrhea, dyspareunia, dysuria, frequency, hematuria, menorrhagia, menstrual problem, pelvic pain, urgency, vaginal bleeding, vaginal pain, urinary incontinence and postcoital bleeding.        Vaginal vault prolapse   Musculoskeletal: Negative for back pain and myalgias.   Integumentary:  Negative for rash, breast mass and nipple discharge.   Neurological: Negative for seizures and headaches.   Psychiatric/Behavioral: Negative for depression and sleep disturbance. The patient is not nervous/anxious.    Breast: Negative for mass, mastodynia and nipple discharge          Objective:    Physical Exam:   Constitutional: She appears well-developed and well-nourished. No distress.    HENT:   Head: Normocephalic and atraumatic.    Eyes: EOM are normal.    Neck: Normal range of motion.    Cardiovascular: Normal rate, regular rhythm and normal heart sounds.     Pulmonary/Chest: Effort normal and breath sounds normal. No respiratory distress.        Abdominal: Soft. She exhibits no distension. There is no tenderness. There is no rebound and no guarding.   Vertical scar      Genitourinary: Vagina normal. No vaginal discharge found.   Genitourinary Comments: Significant atrophy.  Vulva without any obvious lesions.  Vaginal vault with prolapse to introitus.  Moderate brownish red discharge noted.  No obvious lesion.  Vagina with abrasion/ulcerations again on right fornix and posterior to cervix.  Cervix visible. Bleeding/friable area at around 0700 to 0500 into the vagina. Uterus is surgically absent.  Adnexa is without any  masses or tenderness.           Musculoskeletal: Normal range of motion.       Neurological: She is alert.    Skin: Skin is warm and dry.    Psychiatric: She has a normal mood and affect.          Assessment:        1. Vaginal vault prolapse after hysterectomy    2. Menopause    3. Essential hypertension    4. Diabetes mellitus type 2 in nonobese    5. Atrophic vaginitis              Plan:      I have again extensively discussed with the patient her condition.  The proposed procedures of trachelectomy with culdoplasty explained to and again extensively discussed with the patient.  Questions answered.  Consents signed.  Orders written.   Patient will go over to the hospital for preoperative care prior to the day of admission.  She will undergo surgery on 9/16/2019 at 0730 AM.

## 2019-09-09 NOTE — PROGRESS NOTES
Subjective:       Patient ID: Radha Morales is a 78 y.o. female.    Chief Complaint:  Pre-op Exam (Pre-op for Trachelectomy scheduled on 19)      History of Present Illness  HPI  Patient comes in today for preoperative consultation and examination.    Has been seeing us for the past several months for pessary care  Had some spotting/erosion previous visit in 2019 and 2019  Estrogen cream increased to daily for several weeks in April/May.  No abrasion in May 2019  On Premarin cream twice a week.  But still with erosion and bleeding and discharge.  Would like to get surgery if it would fix the problem.     No other complaint.     Status post supracervical abdominal hysterectomy by Dr GUEVARA Singh.  As per our previous discussion, she would get trachelectomy with culdoplasty.      GYN & OB History  No LMP recorded. Patient is postmenopausal.   Date of Last Pap: No result found    OB History    Para Term  AB Living   7 7 7 0 0 7   SAB TAB Ectopic Multiple Live Births                  # Outcome Date GA Lbr Kunal/2nd Weight Sex Delivery Anes PTL Lv   7 Term            6 Term            5 Term            4 Term            3 Term            2 Term            1 Term              Past Medical History:   Diagnosis Date    Arthritis     Diabetes mellitus     Hypertension        Past Surgical History:   Procedure Laterality Date    COLONOSCOPY N/A 3/31/2017    Performed by Apolinar Matute MD at [facility]    HYSTERECTOMY      OVARY SURGERY         Family History   Problem Relation Age of Onset    Diabetes Father     Diabetes Mother        Social History     Socioeconomic History    Marital status:      Spouse name: Not on file    Number of children: Not on file    Years of education: Not on file    Highest education level: Not on file   Occupational History    Not on file   Social Needs    Financial resource strain: Not on file    Food insecurity:     Worry: Not on file      Inability: Not on file    Transportation needs:     Medical: Not on file     Non-medical: Not on file   Tobacco Use    Smoking status: Never Smoker    Smokeless tobacco: Never Used   Substance and Sexual Activity    Alcohol use: No    Drug use: No    Sexual activity: Never     Birth control/protection: None   Lifestyle    Physical activity:     Days per week: Not on file     Minutes per session: Not on file    Stress: Not on file   Relationships    Social connections:     Talks on phone: Not on file     Gets together: Not on file     Attends [employer]al service: Not on file     Active member of club or organization: Not on file     Attends meetings of clubs or organizations: Not on file     Relationship status: Not on file   Other Topics Concern    Not on file   Social History Narrative    .  .    Ex-     Lives alone       Current Outpatient Medications   Medication Sig Dispense Refill    ammonium lactate 12 % Crea   10    aspirin (ECOTRIN) 81 MG EC tablet Take 81 mg by mouth once daily.      chlorthalidone (HYGROTEN) 25 MG Tab Take 25 mg by mouth once daily.      estradiol (ESTRACE) 0.01 % (0.1 mg/gram) vaginal cream PLACE 1 GRAM VAGINALLY EVERY DAY 42.5 g 2    latanoprost 0.005 % ophthalmic solution INSTILL 1 DROP INTO BOTH EYES QHS  3    losartan (COZAAR) 100 MG tablet TK 1 T PO D  0    meloxicam (MOBIC) 7.5 MG tablet Take 7.5 mg by mouth once daily.      metformin (GLUCOPHAGE) 1000 MG tablet Take 1,000 mg by mouth 2 (two) times daily with meals.      multivit,iron,minerals/lutein (CENTRUM SILVER ULTRA WOMEN'S ORAL) Take by mouth.      rosuvastatin (CRESTOR) 5 MG tablet   3     No current facility-administered medications for this visit.        Review of patient's allergies indicates:  No Known Allergies      Review of Systems  Review of Systems   Constitutional: Negative for activity change, appetite change, chills, fatigue, fever and unexpected weight change.    HENT: Negative for mouth sores.    Respiratory: Negative for cough, shortness of breath and wheezing.    Cardiovascular: Negative for chest pain and palpitations.   Gastrointestinal: Negative for abdominal pain, bloating, blood in stool, constipation, nausea and vomiting.   Endocrine: Negative for diabetes and hot flashes.   Genitourinary: Positive for vaginal discharge and vaginal odor. Negative for dysmenorrhea, dyspareunia, dysuria, frequency, hematuria, menorrhagia, menstrual problem, pelvic pain, urgency, vaginal bleeding, vaginal pain, urinary incontinence and postcoital bleeding.        Vaginal vault prolapse   Musculoskeletal: Negative for back pain and myalgias.   Integumentary:  Negative for rash, breast mass and nipple discharge.   Neurological: Negative for seizures and headaches.   Psychiatric/Behavioral: Negative for depression and sleep disturbance. The patient is not nervous/anxious.    Breast: Negative for mass, mastodynia and nipple discharge          Objective:    Physical Exam:   Constitutional: She appears well-developed and well-nourished. No distress.    HENT:   Head: Normocephalic and atraumatic.    Eyes: EOM are normal.    Neck: Normal range of motion.    Cardiovascular: Normal rate, regular rhythm and normal heart sounds.     Pulmonary/Chest: Effort normal and breath sounds normal. No respiratory distress.        Abdominal: Soft. She exhibits no distension. There is no tenderness. There is no rebound and no guarding.   Vertical scar      Genitourinary: Vagina normal. No vaginal discharge found.   Genitourinary Comments: Significant atrophy.  Vulva without any obvious lesions.  Vaginal vault with prolapse to introitus.  Moderate brownish red discharge noted.  No obvious lesion.  Vagina with abrasion/ulcerations again on right fornix and posterior to cervix.  Cervix visible. Bleeding/friable area at around 0700 to 0500 into the vagina. Uterus is surgically absent.  Adnexa is without any  masses or tenderness.           Musculoskeletal: Normal range of motion.       Neurological: She is alert.    Skin: Skin is warm and dry.    Psychiatric: She has a normal mood and affect.          Assessment:        1. Vaginal vault prolapse after hysterectomy    2. Menopause    3. Essential hypertension    4. Diabetes mellitus type 2 in nonobese    5. Atrophic vaginitis              Plan:      I have again extensively discussed with the patient her condition.  The proposed procedures of trachelectomy with culdoplasty explained to and again extensively discussed with the patient.  Questions answered.  Consents signed.  Orders written.   Patient will go over to the hospital for preoperative care prior to the day of admission.  She will undergo surgery on 9/16/2019 at 0730 AM.

## 2019-09-09 NOTE — DISCHARGE INSTRUCTIONS
"Your procedure  is scheduled for __9/16/2019________.    Call [phone] between 2pm and 5pm on _9/13/2019______to find out your arrival time for the day of surgery.    Report to Same Day Surgery Unit at ____ AM on the 2nd floor of the hospital.  Use the front entrance of the hospital.  The front doors of the hospital open promptly at 5:30am.  If you need wheelchair assistance, call [phone] from your cell phone, or call "0" from the courtesy phone in the lobby.    Important instructions:   Do not eat or drink after 12 midnight, including water.  It is okay to brush your teeth.  Do not have gum, candy or mints.     Take only these medications with a small swallow of water on the morning of your surgery ____none__________    Do not take any diabetic medication on the morning of surgery unless instructed to do so by your doctor or pre op nurse.    Stop taking Aspirin, Ibuprofen, Motrin and Aleve , Fish oil, and Vitamin E for at least 7 days before your surgery. You may use Tylenol unless otherwise instructed by your doctor.          Return to the hospital lab on __9/13/2019---9/15/2019__ for additional blood test.   Lab open Monday -Friday   7am-7pm                   Saturday and Sunday  8am to 12 noon                   Closed on holidays.       Please shower the night before and the morning of your surgery.       Do not wear make- up, including mascara.     You may wear deodorant only.      Do not wear powder, body lotion or perfume/cologne.     Do not wear any jewelry or have any metal on your body.     You will be asked to remove any dentures or partials for the procedure.     Please bring any documents given to you by your doctor.     If you are going home on the same day of surgery, you must arrange for a family member or a friend to drive you home.  Public transportation is prohibited.  You will not be able to drive home if you were given anesthesia or sedation.     Wear loose fitting clothes allowing " for bandages.     Please leave money and valuables home.       You may bring your cell phone.     Call the doctor if fever or illness should occur before your surgery.    Call [phone] to contact us here if needed.

## 2019-09-10 LAB
ESTIMATED AVG GLUCOSE: 123 MG/DL (ref 68–131)
HBA1C MFR BLD HPLC: 5.9 % (ref 4–5.6)

## 2019-09-13 ENCOUNTER — LAB VISIT (OUTPATIENT)
Dept: LAB | Facility: HOSPITAL | Age: 79
End: 2019-09-13
Attending: OBSTETRICS & GYNECOLOGY
Payer: MEDICARE

## 2019-09-13 DIAGNOSIS — Z01.818 PREOP TESTING: ICD-10-CM

## 2019-09-13 LAB
ABO + RH BLD: NORMAL
BLD GP AB SCN CELLS X3 SERPL QL: NORMAL

## 2019-09-13 PROCEDURE — 36415 COLL VENOUS BLD VENIPUNCTURE: CPT

## 2019-09-13 PROCEDURE — 86850 RBC ANTIBODY SCREEN: CPT

## 2019-09-15 ENCOUNTER — ANESTHESIA EVENT (OUTPATIENT)
Dept: SURGERY | Facility: HOSPITAL | Age: 79
End: 2019-09-15
Payer: MEDICARE

## 2019-09-16 ENCOUNTER — HOSPITAL ENCOUNTER (OUTPATIENT)
Facility: HOSPITAL | Age: 79
Discharge: HOME | End: 2019-09-17
Attending: OBSTETRICS & GYNECOLOGY | Admitting: OBSTETRICS & GYNECOLOGY
Payer: MEDICARE

## 2019-09-16 ENCOUNTER — ANESTHESIA (OUTPATIENT)
Dept: SURGERY | Facility: HOSPITAL | Age: 79
End: 2019-09-16
Payer: MEDICARE

## 2019-09-16 DIAGNOSIS — K55.9 ISCHEMIC COLITIS: ICD-10-CM

## 2019-09-16 DIAGNOSIS — R07.89 CHEST WALL PAIN: ICD-10-CM

## 2019-09-16 DIAGNOSIS — N81.4 UTERINE PROLAPSE: ICD-10-CM

## 2019-09-16 DIAGNOSIS — T88.7XXA MEDICATION SIDE EFFECT: ICD-10-CM

## 2019-09-16 DIAGNOSIS — Z78.0 MENOPAUSE: ICD-10-CM

## 2019-09-16 DIAGNOSIS — I10 ESSENTIAL HYPERTENSION: Chronic | ICD-10-CM

## 2019-09-16 DIAGNOSIS — E44.0 MALNUTRITION OF MODERATE DEGREE: ICD-10-CM

## 2019-09-16 DIAGNOSIS — E11.9 CONTROLLED TYPE 2 DIABETES MELLITUS WITHOUT COMPLICATION, WITHOUT LONG-TERM CURRENT USE OF INSULIN: Chronic | ICD-10-CM

## 2019-09-16 DIAGNOSIS — R53.81 DEBILITY: ICD-10-CM

## 2019-09-16 DIAGNOSIS — Z90.710 STATUS POST VAGINAL HYSTERECTOMY: Primary | ICD-10-CM

## 2019-09-16 DIAGNOSIS — N99.3 VAGINAL VAULT PROLAPSE AFTER HYSTERECTOMY: ICD-10-CM

## 2019-09-16 LAB
POCT GLUCOSE: 111 MG/DL (ref 70–110)
POCT GLUCOSE: 151 MG/DL (ref 70–110)
POCT GLUCOSE: 152 MG/DL (ref 70–110)
POCT GLUCOSE: 238 MG/DL (ref 70–110)

## 2019-09-16 PROCEDURE — 88307 TISSUE SPECIMEN TO PATHOLOGY - SURGERY: ICD-10-PCS | Mod: 26,,, | Performed by: PATHOLOGY

## 2019-09-16 PROCEDURE — 37000008 HC ANESTHESIA 1ST 15 MINUTES: Performed by: OBSTETRICS & GYNECOLOGY

## 2019-09-16 PROCEDURE — 57283 PR REVAGINAL PROLAPSE,UTEROSACRAL: ICD-10-PCS | Mod: 80,59,, | Performed by: OBSTETRICS & GYNECOLOGY

## 2019-09-16 PROCEDURE — 36000708 HC OR TIME LEV III 1ST 15 MIN: Performed by: OBSTETRICS & GYNECOLOGY

## 2019-09-16 PROCEDURE — 57283 PR REVAGINAL PROLAPSE,UTEROSACRAL: ICD-10-PCS | Mod: 59,,, | Performed by: OBSTETRICS & GYNECOLOGY

## 2019-09-16 PROCEDURE — D9220A PRA ANESTHESIA: ICD-10-PCS | Mod: CRNA,,, | Performed by: NURSE ANESTHETIST, CERTIFIED REGISTERED

## 2019-09-16 PROCEDURE — 25000003 PHARM REV CODE 250: Performed by: OBSTETRICS & GYNECOLOGY

## 2019-09-16 PROCEDURE — 58260 PR VAGINAL HYSTERECTOMY,UTERUS 250 GMS/<: ICD-10-PCS | Mod: 80,,, | Performed by: OBSTETRICS & GYNECOLOGY

## 2019-09-16 PROCEDURE — 63600175 PHARM REV CODE 636 W HCPCS: Performed by: OBSTETRICS & GYNECOLOGY

## 2019-09-16 PROCEDURE — 25000003 PHARM REV CODE 250: Performed by: NURSE ANESTHETIST, CERTIFIED REGISTERED

## 2019-09-16 PROCEDURE — 82962 GLUCOSE BLOOD TEST: CPT | Performed by: OBSTETRICS & GYNECOLOGY

## 2019-09-16 PROCEDURE — 57283 COLPOPEXY INTRAPERITONEAL: CPT | Mod: 59,,, | Performed by: OBSTETRICS & GYNECOLOGY

## 2019-09-16 PROCEDURE — 58260 VAGINAL HYSTERECTOMY: CPT | Mod: ,,, | Performed by: OBSTETRICS & GYNECOLOGY

## 2019-09-16 PROCEDURE — 37000009 HC ANESTHESIA EA ADD 15 MINS: Performed by: OBSTETRICS & GYNECOLOGY

## 2019-09-16 PROCEDURE — D9220A PRA ANESTHESIA: ICD-10-PCS | Mod: ANES,,, | Performed by: ANESTHESIOLOGY

## 2019-09-16 PROCEDURE — 63600175 PHARM REV CODE 636 W HCPCS: Performed by: NURSE ANESTHETIST, CERTIFIED REGISTERED

## 2019-09-16 PROCEDURE — 88307 TISSUE EXAM BY PATHOLOGIST: CPT | Mod: 26,,, | Performed by: PATHOLOGY

## 2019-09-16 PROCEDURE — D9220A PRA ANESTHESIA: Mod: ANES,,, | Performed by: ANESTHESIOLOGY

## 2019-09-16 PROCEDURE — 27200651 HC AIRWAY, LMA: Performed by: NURSE ANESTHETIST, CERTIFIED REGISTERED

## 2019-09-16 PROCEDURE — 71000033 HC RECOVERY, INTIAL HOUR: Performed by: OBSTETRICS & GYNECOLOGY

## 2019-09-16 PROCEDURE — 58260 VAGINAL HYSTERECTOMY: CPT | Mod: 80,,, | Performed by: OBSTETRICS & GYNECOLOGY

## 2019-09-16 PROCEDURE — 71000039 HC RECOVERY, EACH ADD'L HOUR: Performed by: OBSTETRICS & GYNECOLOGY

## 2019-09-16 PROCEDURE — D9220A PRA ANESTHESIA: Mod: CRNA,,, | Performed by: NURSE ANESTHETIST, CERTIFIED REGISTERED

## 2019-09-16 PROCEDURE — 57283 COLPOPEXY INTRAPERITONEAL: CPT | Mod: 80,59,, | Performed by: OBSTETRICS & GYNECOLOGY

## 2019-09-16 PROCEDURE — 88307 TISSUE EXAM BY PATHOLOGIST: CPT | Performed by: PATHOLOGY

## 2019-09-16 PROCEDURE — 25000003 PHARM REV CODE 250: Performed by: ANESTHESIOLOGY

## 2019-09-16 PROCEDURE — 58260 PR VAGINAL HYSTERECTOMY,UTERUS 250 GMS/<: ICD-10-PCS | Mod: ,,, | Performed by: OBSTETRICS & GYNECOLOGY

## 2019-09-16 PROCEDURE — 63600175 PHARM REV CODE 636 W HCPCS: Performed by: ANESTHESIOLOGY

## 2019-09-16 PROCEDURE — 36000709 HC OR TIME LEV III EA ADD 15 MIN: Performed by: OBSTETRICS & GYNECOLOGY

## 2019-09-16 RX ORDER — OXYCODONE AND ACETAMINOPHEN 5; 325 MG/1; MG/1
1 TABLET ORAL EVERY 4 HOURS PRN
Qty: 15 TABLET | Refills: 0 | Status: ON HOLD | OUTPATIENT
Start: 2019-09-16 | End: 2019-12-06

## 2019-09-16 RX ORDER — ONDANSETRON 2 MG/ML
4 INJECTION INTRAMUSCULAR; INTRAVENOUS DAILY PRN
Status: DISCONTINUED | OUTPATIENT
Start: 2019-09-16 | End: 2019-09-16 | Stop reason: HOSPADM

## 2019-09-16 RX ORDER — ONDANSETRON 2 MG/ML
INJECTION INTRAMUSCULAR; INTRAVENOUS
Status: DISCONTINUED | OUTPATIENT
Start: 2019-09-16 | End: 2019-09-16

## 2019-09-16 RX ORDER — METOCLOPRAMIDE HYDROCHLORIDE 5 MG/ML
INJECTION INTRAMUSCULAR; INTRAVENOUS
Status: DISCONTINUED | OUTPATIENT
Start: 2019-09-16 | End: 2019-09-16

## 2019-09-16 RX ORDER — SODIUM CHLORIDE, SODIUM LACTATE, POTASSIUM CHLORIDE, CALCIUM CHLORIDE 600; 310; 30; 20 MG/100ML; MG/100ML; MG/100ML; MG/100ML
INJECTION, SOLUTION INTRAVENOUS CONTINUOUS
Status: DISCONTINUED | OUTPATIENT
Start: 2019-09-16 | End: 2019-09-17 | Stop reason: HOSPADM

## 2019-09-16 RX ORDER — ACETAMINOPHEN 10 MG/ML
1000 INJECTION, SOLUTION INTRAVENOUS ONCE
Status: COMPLETED | OUTPATIENT
Start: 2019-09-16 | End: 2019-09-16

## 2019-09-16 RX ORDER — PROPOFOL 10 MG/ML
VIAL (ML) INTRAVENOUS
Status: DISCONTINUED | OUTPATIENT
Start: 2019-09-16 | End: 2019-09-16

## 2019-09-16 RX ORDER — FENTANYL CITRATE 50 UG/ML
25 INJECTION, SOLUTION INTRAMUSCULAR; INTRAVENOUS EVERY 5 MIN PRN
Status: COMPLETED | OUTPATIENT
Start: 2019-09-16 | End: 2019-09-16

## 2019-09-16 RX ORDER — FENTANYL CITRATE 50 UG/ML
INJECTION, SOLUTION INTRAMUSCULAR; INTRAVENOUS
Status: DISCONTINUED | OUTPATIENT
Start: 2019-09-16 | End: 2019-09-16

## 2019-09-16 RX ORDER — CEFAZOLIN SODIUM 2 G/50ML
2 SOLUTION INTRAVENOUS
Status: COMPLETED | OUTPATIENT
Start: 2019-09-16 | End: 2019-09-16

## 2019-09-16 RX ORDER — LABETALOL HYDROCHLORIDE 5 MG/ML
10 INJECTION, SOLUTION INTRAVENOUS ONCE
Status: COMPLETED | OUTPATIENT
Start: 2019-09-16 | End: 2019-09-16

## 2019-09-16 RX ORDER — OXYCODONE AND ACETAMINOPHEN 5; 325 MG/1; MG/1
1 TABLET ORAL EVERY 4 HOURS PRN
Status: DISCONTINUED | OUTPATIENT
Start: 2019-09-16 | End: 2019-09-17 | Stop reason: HOSPADM

## 2019-09-16 RX ORDER — SODIUM CHLORIDE 0.9 % (FLUSH) 0.9 %
10 SYRINGE (ML) INJECTION
Status: DISCONTINUED | OUTPATIENT
Start: 2019-09-16 | End: 2019-09-16 | Stop reason: HOSPADM

## 2019-09-16 RX ORDER — GLYCOPYRROLATE 0.2 MG/ML
INJECTION INTRAMUSCULAR; INTRAVENOUS
Status: DISCONTINUED | OUTPATIENT
Start: 2019-09-16 | End: 2019-09-16

## 2019-09-16 RX ORDER — IBUPROFEN 600 MG/1
600 TABLET ORAL EVERY 6 HOURS PRN
Qty: 30 TABLET | Refills: 1 | Status: ON HOLD | OUTPATIENT
Start: 2019-09-16 | End: 2019-12-07 | Stop reason: HOSPADM

## 2019-09-16 RX ORDER — VASOPRESSIN 20 [USP'U]/ML
INJECTION, SOLUTION INTRAMUSCULAR; SUBCUTANEOUS
Status: DISCONTINUED | OUTPATIENT
Start: 2019-09-16 | End: 2019-09-16 | Stop reason: HOSPADM

## 2019-09-16 RX ORDER — LOSARTAN POTASSIUM 25 MG/1
100 TABLET ORAL DAILY
Status: COMPLETED | OUTPATIENT
Start: 2019-09-16 | End: 2019-09-17

## 2019-09-16 RX ORDER — IBUPROFEN 600 MG/1
600 TABLET ORAL EVERY 6 HOURS PRN
Status: DISCONTINUED | OUTPATIENT
Start: 2019-09-16 | End: 2019-09-17 | Stop reason: HOSPADM

## 2019-09-16 RX ORDER — LIDOCAINE HCL/PF 100 MG/5ML
SYRINGE (ML) INTRAVENOUS
Status: DISCONTINUED | OUTPATIENT
Start: 2019-09-16 | End: 2019-09-16

## 2019-09-16 RX ORDER — KETOROLAC TROMETHAMINE 30 MG/ML
15 INJECTION, SOLUTION INTRAMUSCULAR; INTRAVENOUS EVERY 8 HOURS PRN
Status: DISCONTINUED | OUTPATIENT
Start: 2019-09-16 | End: 2019-09-16 | Stop reason: HOSPADM

## 2019-09-16 RX ORDER — METFORMIN HYDROCHLORIDE 500 MG/1
1000 TABLET ORAL 2 TIMES DAILY WITH MEALS
Status: DISCONTINUED | OUTPATIENT
Start: 2019-09-16 | End: 2019-09-17 | Stop reason: HOSPADM

## 2019-09-16 RX ADMIN — LOSARTAN POTASSIUM 100 MG: 25 TABLET, FILM COATED ORAL at 10:09

## 2019-09-16 RX ADMIN — FENTANYL CITRATE 25 MCG: 50 INJECTION, SOLUTION INTRAMUSCULAR; INTRAVENOUS at 09:09

## 2019-09-16 RX ADMIN — Medication 20 MG: at 08:09

## 2019-09-16 RX ADMIN — ACETAMINOPHEN 1000 MG: 10 INJECTION, SOLUTION INTRAVENOUS at 09:09

## 2019-09-16 RX ADMIN — METFORMIN HYDROCHLORIDE 1000 MG: 500 TABLET ORAL at 07:09

## 2019-09-16 RX ADMIN — OXYCODONE HYDROCHLORIDE AND ACETAMINOPHEN 1 TABLET: 5; 325 TABLET ORAL at 11:09

## 2019-09-16 RX ADMIN — PROPOFOL 20 MG: 10 INJECTION, EMULSION INTRAVENOUS at 07:09

## 2019-09-16 RX ADMIN — LIDOCAINE HYDROCHLORIDE 100 MG: 20 INJECTION, SOLUTION INTRAVENOUS at 07:09

## 2019-09-16 RX ADMIN — CEFAZOLIN SODIUM 2 G: 2 SOLUTION INTRAVENOUS at 07:09

## 2019-09-16 RX ADMIN — ONDANSETRON 4 MG: 2 INJECTION, SOLUTION INTRAMUSCULAR; INTRAVENOUS at 07:09

## 2019-09-16 RX ADMIN — PROPOFOL 20 MG: 10 INJECTION, EMULSION INTRAVENOUS at 08:09

## 2019-09-16 RX ADMIN — METOCLOPRAMIDE 10 MG: 5 INJECTION, SOLUTION INTRAMUSCULAR; INTRAVENOUS at 07:09

## 2019-09-16 RX ADMIN — PROPOFOL 100 MG: 10 INJECTION, EMULSION INTRAVENOUS at 07:09

## 2019-09-16 RX ADMIN — SODIUM CHLORIDE, SODIUM LACTATE, POTASSIUM CHLORIDE, AND CALCIUM CHLORIDE: .6; .31; .03; .02 INJECTION, SOLUTION INTRAVENOUS at 06:09

## 2019-09-16 RX ADMIN — FENTANYL CITRATE 50 MCG: 50 INJECTION INTRAMUSCULAR; INTRAVENOUS at 07:09

## 2019-09-16 RX ADMIN — FENTANYL CITRATE 25 MCG: 50 INJECTION, SOLUTION INTRAMUSCULAR; INTRAVENOUS at 10:09

## 2019-09-16 RX ADMIN — IBUPROFEN 600 MG: 600 TABLET, FILM COATED ORAL at 01:09

## 2019-09-16 RX ADMIN — GLYCOPYRROLATE 0.1 MG: 0.2 INJECTION, SOLUTION INTRAMUSCULAR; INTRAVENOUS at 07:09

## 2019-09-16 RX ADMIN — LABETALOL HYDROCHLORIDE 10 MG: 5 INJECTION INTRAVENOUS at 09:09

## 2019-09-16 NOTE — ANESTHESIA PREPROCEDURE EVALUATION
09/16/2019  Radha Morales is a 78 y.o., female.  Pre-operative evaluation for Procedure(s) (LRB):  Removal of cervix with culdoplasty  (N/A)    METS 1-3;uses cane or walker  NPO>8h  Denies SOB/CPon exertion    Patient Active Problem List   Diagnosis    Chest wall pain    Essential hypertension    Type 2 diabetes mellitus, controlled    Ischemic colitis    Leukocytosis    Malnutrition of moderate degree    Debility    Medication side effect    Vaginal vault prolapse after hysterectomy       Review of patient's allergies indicates:  No Known Allergies    No current facility-administered medications on file prior to encounter.      Current Outpatient Medications on File Prior to Encounter   Medication Sig Dispense Refill    ammonium lactate 12 % Crea   10    chlorthalidone (HYGROTEN) 25 MG Tab Take 25 mg by mouth once daily.      estradiol (ESTRACE) 0.01 % (0.1 mg/gram) vaginal cream PLACE 1 GRAM VAGINALLY EVERY DAY 42.5 g 2    latanoprost 0.005 % ophthalmic solution INSTILL 1 DROP INTO BOTH EYES QHS  3    losartan (COZAAR) 100 MG tablet TK 1 T PO D  0    metformin (GLUCOPHAGE) 1000 MG tablet Take 1,000 mg by mouth 2 (two) times daily with meals.      aspirin (ECOTRIN) 81 MG EC tablet Take 81 mg by mouth once daily.      meloxicam (MOBIC) 7.5 MG tablet Take 7.5 mg by mouth once daily.      multivit,iron,minerals/lutein (CENTRUM SILVER ULTRA WOMEN'S ORAL) Take by mouth.      rosuvastatin (CRESTOR) 5 MG tablet   3       Past Surgical History:   Procedure Laterality Date    CATARACT EXTRACTION Bilateral     COLONOSCOPY N/A 3/31/2017    Performed by [facility] at [facility]    HYSTERECTOMY  1995    OVARY SURGERY         Social History     Socioeconomic History    Marital status:      Spouse name: Not on file    Number of children: Not on file    Years of education: Not on  file    Highest education level: Not on file   Occupational History    Not on file   Social Needs    Financial resource strain: Not on file    Food insecurity:     Worry: Not on file     Inability: Not on file    Transportation needs:     Medical: Not on file     Non-medical: Not on file   Tobacco Use    Smoking status: Never Smoker    Smokeless tobacco: Never Used   Substance and Sexual Activity    Alcohol use: No    Drug use: No    Sexual activity: Never     Birth control/protection: None   Lifestyle    Physical activity:     Days per week: Not on file     Minutes per session: Not on file    Stress: Not on file   Relationships    Social connections:     Talks on phone: Not on file     Gets together: Not on file     Attends Sabianism service: Not on file     Active member of club or organization: Not on file     Attends meetings of clubs or organizations: Not on file     Relationship status: Not on file   Other Topics Concern    Not on file   Social History Narrative    .  .    Ex-     Lives alone         CBC: No results for input(s): WBC, RBC, HGB, HCT, PLT, MCV, MCH, MCHC in the last 72 hours.    CMP: No results for input(s): NA, K, CL, CO2, BUN, CREATININE, GLU, MG, PHOS, CALCIUM, ALBUMIN, PROT, ALKPHOS, ALT, AST, BILITOT in the last 72 hours.    INR  No results for input(s): PT, INR, PROTIME, APTT in the last 72 hours.        Diagnostic Studies:      EKD Echo:  Results for orders placed or performed during the hospital encounter of 17   2D echo with color flow doppler   Result Value Ref Range    QEF 55 55 - 65    Diastolic Dysfunction No     Est. PA Systolic Pressure 30.67     Pericardial Effusion NONE     Tricuspid Valve Regurgitation TRIVIAL TO MILD        Anesthesia Evaluation    I have reviewed the Patient Summary Reports.    I have reviewed the Nursing Notes.   I have reviewed the Medications.     Review of Systems  Anesthesia Hx:  No problems with  previous Anesthesia  History of prior surgery of interest to airway management or planning: Previous anesthesia: MAC  cataract with MAC.    Social:  Non-Smoker, No Alcohol Use    Hematology/Oncology:  Hematology Normal   Oncology Normal     EENT/Dental:EENT/Dental Normal   Cardiovascular:   Exercise tolerance: poor Hypertension    Pulmonary:  Pulmonary Normal    Renal/:  Renal/ Normal     Hepatic/GI:  Hepatic/GI Normal    Musculoskeletal:  Musculoskeletal Normal    Neurological:  Neurology Normal    Endocrine:   Diabetes, well controlled    Dermatological:  Skin Normal    Psych:  Psychiatric Normal           Physical Exam  General:  Well nourished    Airway/Jaw/Neck:  Airway Findings: Mouth Opening: Normal Tongue: Normal  General Airway Assessment: Adult  Mallampati: II  TM Distance: Normal, at least 6 cm        Eyes/Ears/Nose:  EYES/EARS/NOSE FINDINGS: Normal   Dental:  Dental Findings: (Only two bottom teeth present; not loose) Lower partial dentures, Upper Dentures   Chest/Lungs:  Chest/Lungs Clear    Heart/Vascular:  Heart Findings: Normal Heart murmur: negative       Mental Status:  Mental Status Findings: Normal        Anesthesia Plan  Type of Anesthesia, risks & benefits discussed:  Anesthesia Type:  general  Patient's Preference:   Intra-op Monitoring Plan: standard ASA monitors  Intra-op Monitoring Plan Comments:   Post Op Pain Control Plan: multimodal analgesia  Post Op Pain Control Plan Comments:   Induction:   IV  Beta Blocker:         Informed Consent: Patient understands risks and agrees with Anesthesia plan.  Questions answered. Anesthesia consent signed with patient.  ASA Score: 2     Day of Surgery Review of History & Physical:  There are no significant changes.          Ready For Surgery From Anesthesia Perspective.

## 2019-09-16 NOTE — BRIEF OP NOTE
OPERATIVE NOTES     Date of Procedure: 2019    Preoperative Diagnoses:  1.  Vaginal vault prolapse  2.  Status post abdominal hysterectomy    Postoperative Diagnoses:  1.  Uterine prolapse    Procedures:  1.  Total vaginal hysterectomy  2.  Porras culdoplasty.    Surgeon: MD Kaden  Assistant: MD Sukhdev    Anesthesia: GETA   EBL: 75 cc    Findings:  1.  Prolapse with cervix to introitus, grade 2-3 cystocele and rectocele  2.  Adhesions from small bowel to uterus both anteriorly and posteriorly and to the fundus of the uterus  3.  Small uterus with anterior fibroid    Complications:  1.  None    Specimen:  1.  Small uterus with attached cervix and small fibroid    History:   79 yo   Has been seeing us for the past year for pessary care  Had some spotting/erosion previous visit in 2019 and 2019  Estrogen cream increased to daily for several weeks in April/May.  No abrasion in May 2019  On Premarin cream twice a week.  But still with erosion and bleeding and discharge.  Would like to get surgery if it would fix the problem.     No other complaint.     Status post supracervical abdominal hysterectomy by Dr GUEVARA Singh.  As per our previous discussion, she would get trachelectomy with culdoplasty.     Again, procedures explained  Risks and benefits discussed including risks of bowel and bladder injuries with risks of laparotomy to repair discussed.  Questions answered  Consents signed   She is eager to proceed    Sundar Alfonso MD

## 2019-09-16 NOTE — INTERVAL H&P NOTE
The patient has been examined and the H&P has been reviewed:    I concur with the findings and no changes have occurred since H&P was written.    Anesthesia/Surgery risks, benefits and alternative options discussed and understood by patient/family.          Active Hospital Problems    Diagnosis  POA    Vaginal vault prolapse after hysterectomy [N99.3]  Yes      Resolved Hospital Problems   No resolved problems to display.     79 yo   Has been seeing us for the past year for pessary care  Had some spotting/erosion previous visit in 2019 and 2019  Estrogen cream increased to daily for several weeks in April/May.  No abrasion in May 2019  On Premarin cream twice a week.  But still with erosion and bleeding and discharge.  Would like to get surgery if it would fix the problem.     No other complaint.     Status post supracervical abdominal hysterectomy by Dr GUEVARA Singh.  As per our previous discussion, she would get trachelectomy with culdoplasty.    Again, procedures explained  Risks and benefits discussed including risks of bowel and bladder injuries with risks of laparotomy to repair discussed.  Questions answered  Consents signed   She is eager to proceed    Sundar Alfonso MD

## 2019-09-16 NOTE — TRANSFER OF CARE
"Anesthesia Transfer of Care Note    Patient: Radha Morales    Procedure(s) Performed: Procedure(s) (LRB):  Removal of cervix with culdoplasty  (N/A)    Patient location: PACU    Anesthesia Type: general    Transport from OR: Transported from OR on room air with adequate spontaneous ventilation    Post pain: adequate analgesia    Post assessment: no apparent anesthetic complications and tolerated procedure well    Post vital signs: stable    Level of consciousness: awake, alert and oriented    Nausea/Vomiting: no nausea/vomiting    Complications: none    Transfer of care protocol was followed      Last vitals:   Visit Vitals  BP (!) 198/80   Pulse 74   Temp 36.5 °C (97.7 °F) (Oral)   Resp 16   Ht 5' 5" (1.651 m)   Wt 77.1 kg (170 lb)   SpO2 96%   Breastfeeding? No   BMI 28.29 kg/m²     "

## 2019-09-16 NOTE — NURSING
Updated Dr. Alfonso over phone of patient's status.  Orders received and informed patient of plan of care.

## 2019-09-16 NOTE — ANESTHESIA POSTPROCEDURE EVALUATION
Anesthesia Post Evaluation    Patient: Radha Morales    Procedure(s) Performed: Procedure(s) (LRB):  Removal of cervix with culdoplasty  (N/A)  HYSTERECTOMY, TOTAL, VAGINAL    Final Anesthesia Type: general  Patient location during evaluation: PACU  Patient participation: Yes- Able to Participate  Level of consciousness: awake and alert  Post-procedure vital signs: reviewed and stable  Pain management: adequate  Airway patency: patent  PONV status at discharge: No PONV  Anesthetic complications: no      Cardiovascular status: blood pressure returned to baseline  Respiratory status: spontaneous ventilation  Hydration status: euvolemic  Follow-up not needed.          Vitals Value Taken Time   /62 9/16/2019 10:46 AM   Temp 36.1 °C (97 °F) 9/16/2019 10:46 AM   Pulse 69 9/16/2019 10:46 AM   Resp 16 9/16/2019 10:46 AM   SpO2 97 % 9/16/2019 10:46 AM         Event Time     Out of Recovery 10:26:08          Pain/Karlee Score: Pain Rating Prior to Med Admin: 5 (9/16/2019 10:15 AM)  Karlee Score: 9 (9/16/2019 10:15 AM)

## 2019-09-16 NOTE — OP NOTE
OPERATIVE NOTES      Date of Procedure: 2019     Preoperative Diagnoses:  1.  Vaginal vault prolapse  2.  Status post abdominal hysterectomy     Postoperative Diagnoses:  1.  Uterine prolapse     Procedures:  1.  Total vaginal hysterectomy  2.  Porras culdoplasty.     Surgeon: MD Kaden  Assistant: MD Sukhdev     Anesthesia: GETA   EBL: 75 cc     Findings:  1.  Prolapse with cervix to introitus, grade 2-3 cystocele and rectocele  2.  Adhesions from small bowel to uterus both anteriorly and posteriorly and to the fundus of the uterus  3.  Small uterus with anterior fibroid     Complications:  1.  None     Specimen:  1.  Small uterus with attached cervix and small fibroid     History:   79 yo   Has been seeing us for the past year for pessary care  Had some spotting/erosion previous visit in 2019 and 2019  Estrogen cream increased to daily for several weeks in April/May.  No abrasion in May 2019  On Premarin cream twice a week.  But still with erosion and bleeding and discharge.  Would like to get surgery if it would fix the problem.     No other complaint.     Status post supracervical abdominal hysterectomy by Dr GUEVARA Singh.  As per our previous discussion, she would get trachelectomy with culdoplasty.     Again, procedures explained  Risks and benefits discussed including risks of bowel and bladder injuries with risks of laparotomy to repair discussed.  Questions answered  Consents signed   She is eager to proceed    PROCEDURES IN DETAILS    After confirming appropriate consents were signed and in chart, patient was taking to the operating room where general anesthesia was administered and found to be adequate. She was prepped and draped in the dorsal lithotomy position. A weighted sterile speculum was placed in the vagina. The anterior lip of the cervix was grasped with a single tooth tenaculum.     Allis clamps placed on both lips of cervix.  A weak solution of pitressin injected into the  "anterior cervix.  Bovie used to incise into the cervix from 0300 to 0900.  The vaginal wall was then bluntly and sharply dissected off of the cervix using Metzenbaum scissors and surgeon's finger.  The peritoneal membranes picked up and incised using Metzenbaum scissors.  Small bowel and omental fat seen confirming proper entry to the peritoneal cavity.  Bladder pillars taken bilaterally using curved Munira clamps, Hansen scissors, and number 1 vicryl popoff.     At this point, we realized that the "cervix" was much longer than a normal post-Hysterectomy cervix.   The cardinal ligament was then taken in sequence, again using curved Munira clamps and Metzenbaum scissors with #1 vicryl pop-offs.    We then entered the posterior cul de sac.  The posterior vaginal fornix picked up with Allis clamp and incised using Hansen scissors.  Posterior peritoneum identified and entered without difficulty.  The broad ligament was then taken in several bites on each side, again using the curved Munira, Hansen scissors and #1 vicryl pop-offs.  We had to dissect away extensive bowel adhesions to uterus to free up the uterus before safely placing our Munira.  Bilateral cornua were then taken using curved Munira and uterus was excised with Hansen scissors.    Significant small bowel adhesions noted in peritoneum, preventing us from examination of the adnexa.    Culdoplasty performed next. Allis clamps placed posteriorly through the vaginal vault into the peritoneal cavity to identify the uterosacral ligament bilaterally.  #1 Vicryl pop-off was used to place through both ligaments, then brought out to posterior vaginal wall.  Another stitch at about 1 cm higher than the first stitch was also placed in a similar manner.    The peritoneum was then closed in a pursestring fashion using #1 vicryl, making sure we incorporated the peritoneum anteriorly, the pedicles from adnexa bilaterally and the posterior peritoneum.  The sutures from culdoplasty " were then brought anteriorly and tied.    Three more interrupted sutures placed on vaginal cuff to obtain good hemostasis.    Vaginal packed with Kerlix soaked in Premarin.    Urine was clear throughout the case.    Patient extubated and transferred to the Recovery Room in stable condition.

## 2019-09-17 VITALS
TEMPERATURE: 98 F | WEIGHT: 170 LBS | HEIGHT: 65 IN | SYSTOLIC BLOOD PRESSURE: 152 MMHG | DIASTOLIC BLOOD PRESSURE: 70 MMHG | HEART RATE: 74 BPM | RESPIRATION RATE: 17 BRPM | OXYGEN SATURATION: 96 % | BODY MASS INDEX: 28.32 KG/M2

## 2019-09-17 LAB — POCT GLUCOSE: 103 MG/DL (ref 70–110)

## 2019-09-17 PROCEDURE — 25000003 PHARM REV CODE 250: Performed by: OBSTETRICS & GYNECOLOGY

## 2019-09-17 RX ADMIN — METFORMIN HYDROCHLORIDE 1000 MG: 500 TABLET ORAL at 08:09

## 2019-09-17 RX ADMIN — LOSARTAN POTASSIUM 100 MG: 25 TABLET, FILM COATED ORAL at 08:09

## 2019-09-17 NOTE — PLAN OF CARE
Problem: Adult Inpatient Plan of Care  Goal: Plan of Care Review  Outcome: Ongoing (interventions implemented as appropriate)  Tolerating ADA diet. Passing flatus. Vaginal packing intact.  Ocasio cath draining clear straw colored urine to urometer.  Verbalizes pain control with prn pain medications.  Verbalizes understanding of plan of care.

## 2019-09-17 NOTE — PROGRESS NOTES
Discontinued Ocasio cath, discarded 1800 cc of clear yellow urine. Up to bathroom via ambulation w/o difficulty.  Voided x1 , passing lots of gas.

## 2019-09-17 NOTE — DISCHARGE SUMMARY
DISCHARGE SUMMARY    Date of Admission: 2019  Date of Discharge: 2019    Diagnoses on Discharge:   Status post total vaginal hysterectomy with Porras culdoplasty    History:   77 yo   Has been seeing us for the past year for pessary care  Had some spotting/erosion previous visit in 2019 and 2019  Estrogen cream increased to daily for several weeks in April/May.  No abrasion in May 2019  On Premarin cream twice a week.  But still with erosion and bleeding and discharge.  Would like to get surgery if it would fix the problem.     No other complaint.     Status post supracervical abdominal hysterectomy by Dr GUEVARA Singh.  As per our previous discussion, she would get trachelectomy with culdoplasty.     Again, procedures explained  Risks and benefits discussed including risks of bowel and bladder injuries with risks of laparotomy to repair discussed.  Questions answered  Consents signed   She is eager to proceed       OPERATIVE NOTES      Date of Procedure: 2019     Preoperative Diagnoses:  1.  Vaginal vault prolapse  2.  Status post abdominal hysterectomy     Postoperative Diagnoses:  1.  Uterine prolapse     Procedures:  1.  Total vaginal hysterectomy  2.  Porras culdoplasty.     Surgeon: MD Kaden  Assistant: MD Sukhdev     Anesthesia: GETA   EBL: 75 cc     Findings:  1.  Prolapse with cervix to introitus, grade 2-3 cystocele and rectocele  2.  Adhesions from small bowel to uterus both anteriorly and posteriorly and to the fundus of the uterus  3.  Small uterus with anterior fibroid     Complications:  1.  None     Specimen:  1.  Small uterus with attached cervix and small fibroid    Postoperative course was benign.  She is tolerating oral intake well.  Exam was benign with patient afebrile, vitals stable, and minimal bleeding.  Normal activities.   Regular diet  Patient discharged home on postoperative day #1, 2019  Discharge medications include Percocet, Motrin.  Sundar Alfonso MD.

## 2019-09-17 NOTE — PROGRESS NOTES
Up to bathroom, voided on toilet w/o difficulty.  Vaginal packing came out while on toilet, no vaginal bleeding present

## 2019-09-17 NOTE — PLAN OF CARE
Problem: Adult Inpatient Plan of Care  Goal: Plan of Care Review  Outcome: Ongoing (interventions implemented as appropriate)  Reviewed plan of care, verbalizes understanding.  Pain controlled with Percocet.  Discontinued Ocasio cath, up to bathroom to void on toilet, vaginal packing remain intact.  Sat in chair at bedside w/o distress.  Dr. Alfonso visited.  Turkey sandwich and water given, tolerated well. Resting well w/o acute distress.  Will continue to monitor closely

## 2019-09-30 ENCOUNTER — OFFICE VISIT (OUTPATIENT)
Dept: OBSTETRICS AND GYNECOLOGY | Facility: CLINIC | Age: 79
End: 2019-09-30
Payer: MEDICARE

## 2019-09-30 VITALS
SYSTOLIC BLOOD PRESSURE: 124 MMHG | BODY MASS INDEX: 28.32 KG/M2 | WEIGHT: 170 LBS | DIASTOLIC BLOOD PRESSURE: 60 MMHG | HEIGHT: 65 IN

## 2019-09-30 DIAGNOSIS — Z09 POSTOP CHECK: Primary | ICD-10-CM

## 2019-09-30 PROCEDURE — 99024 POSTOP FOLLOW-UP VISIT: CPT | Mod: S$GLB,,, | Performed by: OBSTETRICS & GYNECOLOGY

## 2019-09-30 PROCEDURE — 99024 PR POST-OP FOLLOW-UP VISIT: ICD-10-PCS | Mod: S$GLB,,, | Performed by: OBSTETRICS & GYNECOLOGY

## 2019-09-30 PROCEDURE — 99999 PR PBB SHADOW E&M-EST. PATIENT-LVL III: CPT | Mod: PBBFAC,,, | Performed by: OBSTETRICS & GYNECOLOGY

## 2019-09-30 PROCEDURE — 99999 PR PBB SHADOW E&M-EST. PATIENT-LVL III: ICD-10-PCS | Mod: PBBFAC,,, | Performed by: OBSTETRICS & GYNECOLOGY

## 2019-09-30 NOTE — PROGRESS NOTES
Subjective:       Patient ID: Radha Morales is a 78 y.o. female.    Chief Complaint:  Post-op Evaluation (2 wks PO TLH on 19)      History of Present Illness  HPI  Ms Morales is a 78 years old, status post total vaginal hysterectomy with Porras culdoplasty on 2019.  She comes in today for an exam and followup.  Patient has no current complaints.  No fever or chills.  No nausea or vomiting.  No diarrhea or constipation.  No abdominal or pelvic pain.    She has NOT resumed normal intercourse.  Patient has begun some walking for exercise. She denies having signs and symptoms of significant depression.  She is tolerating oral intake well.      GYN & OB History  No LMP recorded. Patient is postmenopausal.   Date of Last Pap: No result found    OB History    Para Term  AB Living   7 7 7 0 0 7   SAB TAB Ectopic Multiple Live Births                  # Outcome Date GA Lbr Kunal/2nd Weight Sex Delivery Anes PTL Lv   7 Term            6 Term            5 Term            4 Term            3 Term            2 Term            1 Term              Past Medical History:   Diagnosis Date    Arthritis     Diabetes mellitus     Hypertension        Past Surgical History:   Procedure Laterality Date    CATARACT EXTRACTION Bilateral     CERVIX REMOVAL N/A 2019    Procedure: Removal of cervix with culdoplasty ;  Surgeon: Sundar Alfonso MD;  Location: [facility] OR;  Service: OB/GYN;  Laterality: N/A;    COLONOSCOPY N/A 3/31/2017    Procedure: COLONOSCOPY;  Surgeon: Apolinar Matute MD;  Location: [facility] ENDO;  Service: Endoscopy;  Laterality: N/A;    HYSTERECTOMY  1995    OVARY SURGERY      TOTAL VAGINAL HYSTERECTOMY  2019    Procedure: HYSTERECTOMY, TOTAL, VAGINAL;  Surgeon: Sundar Alfonso MD;  Location: [facility] OR;  Service: OB/GYN;;       Family History   Problem Relation Age of Onset    Diabetes Father     Diabetes Mother        Social History     Socioeconomic History    Marital status:       Spouse name: Not on file    Number of children: Not on file    Years of education: Not on file    Highest education level: Not on file   Occupational History    Not on file   Social Needs    Financial resource strain: Not on file    Food insecurity:     Worry: Not on file     Inability: Not on file    Transportation needs:     Medical: Not on file     Non-medical: Not on file   Tobacco Use    Smoking status: Never Smoker    Smokeless tobacco: Never Used   Substance and Sexual Activity    Alcohol use: No    Drug use: No    Sexual activity: Never     Birth control/protection: None   Lifestyle    Physical activity:     Days per week: Not on file     Minutes per session: Not on file    Stress: Not on file   Relationships    Social connections:     Talks on phone: Not on file     Gets together: Not on file     Attends Buddhism service: Not on file     Active member of club or organization: Not on file     Attends meetings of clubs or organizations: Not on file     Relationship status: Not on file   Other Topics Concern    Not on file   Social History Narrative    .  .    Ex-     Lives alone       Current Outpatient Medications   Medication Sig Dispense Refill    ammonium lactate 12 % Crea   10    aspirin (ECOTRIN) 81 MG EC tablet Take 81 mg by mouth once daily.      chlorthalidone (HYGROTEN) 25 MG Tab Take 25 mg by mouth once daily.      estradiol (ESTRACE) 0.01 % (0.1 mg/gram) vaginal cream PLACE 1 GRAM VAGINALLY EVERY DAY 42.5 g 2    ibuprofen (ADVIL,MOTRIN) 600 MG tablet Take 1 tablet (600 mg total) by mouth every 6 (six) hours as needed for Pain. 30 tablet 1    latanoprost 0.005 % ophthalmic solution INSTILL 1 DROP INTO BOTH EYES QHS  3    losartan (COZAAR) 100 MG tablet TK 1 T PO D  0    meloxicam (MOBIC) 7.5 MG tablet Take 7.5 mg by mouth once daily.      metformin (GLUCOPHAGE) 1000 MG tablet Take 1,000 mg by mouth 2 (two) times daily with meals.       multivit,iron,minerals/lutein (CENTRUM SILVER ULTRA WOMEN'S ORAL) Take by mouth.      oxyCODONE-acetaminophen (PERCOCET) 5-325 mg per tablet Take 1 tablet by mouth every 4 (four) hours as needed for Pain. 15 tablet 0    rosuvastatin (CRESTOR) 5 MG tablet   3     No current facility-administered medications for this visit.        Review of patient's allergies indicates:  No Known Allergies    Review of Systems  Review of Systems   Constitutional: Negative for activity change, appetite change, chills, fatigue, fever and unexpected weight change.   HENT: Negative for mouth sores.    Respiratory: Negative for cough, shortness of breath and wheezing.    Cardiovascular: Negative for chest pain and palpitations.   Gastrointestinal: Negative for abdominal pain, bloating, blood in stool, constipation, nausea and vomiting.   Endocrine: Negative for diabetes and hot flashes.   Genitourinary: Positive for vaginal discharge and vaginal odor. Negative for dysmenorrhea, dyspareunia, dysuria, frequency, hematuria, menorrhagia, menstrual problem, pelvic pain, urgency, vaginal bleeding, vaginal pain, urinary incontinence and postcoital bleeding.   Musculoskeletal: Negative for back pain and myalgias.   Integumentary:  Negative for rash, breast mass and nipple discharge.   Neurological: Negative for seizures and headaches.   Psychiatric/Behavioral: Negative for depression and sleep disturbance. The patient is not nervous/anxious.    Breast: Negative for mass, mastodynia and nipple discharge          Objective:    Physical Exam:   Constitutional: She appears well-developed and well-nourished. No distress.    HENT:   Head: Normocephalic and atraumatic.    Eyes: EOM are normal.    Neck: Normal range of motion.     Pulmonary/Chest: Effort normal. No respiratory distress.   Breasts: Non-tender, no engorgement, no masses, no retraction, no discharge. Negative for lymphadenopathy.         Abdominal: Soft. She exhibits no distension. There  is no tenderness. There is no rebound and no guarding.     Genitourinary: Vaginal discharge found.   Genitourinary Comments: Moderate atrophy.  Vulva without any obvious lesions.  Urethral meatus normal size and location without any lesion.  Urethra is non-tender without stricture or discharge.  Bladder is non-tender.  Vaginal vault with better support.  Minimal yellow discharge noted.  No obvious lesion.  Vaginal cuff intact with sutures showing.  Cervix is surgically absent.   Perineum without obvious lesion.             Musculoskeletal: Normal range of motion.       Neurological: She is alert.    Skin: Skin is warm and dry.    Psychiatric: She has a normal mood and affect.          Assessment:        1. Postop check    2.  Status post total vaginal hysterectomy         Plan:      I have discussed with the patient her condition.  She is doing well with respect to surgery.  She will continue to use Premarin cream two to three times a week.  She will be back in 4 weeks for follow-up.  She can come back sooner if she needs us.

## 2019-10-28 ENCOUNTER — OFFICE VISIT (OUTPATIENT)
Dept: OBSTETRICS AND GYNECOLOGY | Facility: CLINIC | Age: 79
End: 2019-10-28
Payer: MEDICARE

## 2019-10-28 DIAGNOSIS — Z09 POSTOP CHECK: Primary | ICD-10-CM

## 2019-10-28 PROCEDURE — 99499 UNLISTED E&M SERVICE: CPT | Mod: S$GLB,,, | Performed by: OBSTETRICS & GYNECOLOGY

## 2019-10-28 PROCEDURE — 99499 NO LOS: ICD-10-PCS | Mod: S$GLB,,, | Performed by: OBSTETRICS & GYNECOLOGY

## 2019-10-30 NOTE — PROGRESS NOTES
Patient is status post total vaginal hysterectomy with culdoplasty on 9/16/2019  Left without being seen on day of service.  Will re-schedule at a later date.

## 2019-11-04 DIAGNOSIS — M17.11 OSTEOARTHRITIS OF RIGHT KNEE: Primary | ICD-10-CM

## 2019-11-15 ENCOUNTER — HOSPITAL ENCOUNTER (OUTPATIENT)
Dept: RADIOLOGY | Facility: HOSPITAL | Age: 79
Discharge: HOME | End: 2019-11-15
Attending: ORTHOPAEDIC SURGERY
Payer: MEDICARE

## 2019-11-15 DIAGNOSIS — M17.11 OSTEOARTHRITIS OF RIGHT KNEE: ICD-10-CM

## 2019-11-15 PROCEDURE — 73700 CT LOWER EXTREMITY W/O DYE: CPT | Mod: TC,RT

## 2019-11-15 PROCEDURE — 73700 CT KNEE WITHOUT CONTRAST RIGHT: ICD-10-PCS | Mod: 26,RT,, | Performed by: RADIOLOGY

## 2019-11-15 PROCEDURE — 73700 CT LOWER EXTREMITY W/O DYE: CPT | Mod: 26,RT,, | Performed by: RADIOLOGY

## 2019-11-18 ENCOUNTER — OFFICE VISIT (OUTPATIENT)
Dept: OBSTETRICS AND GYNECOLOGY | Facility: CLINIC | Age: 79
End: 2019-11-18
Payer: MEDICARE

## 2019-11-18 VITALS
SYSTOLIC BLOOD PRESSURE: 130 MMHG | WEIGHT: 176.13 LBS | DIASTOLIC BLOOD PRESSURE: 60 MMHG | HEIGHT: 65 IN | BODY MASS INDEX: 29.34 KG/M2

## 2019-11-18 DIAGNOSIS — Z46.89 PESSARY MAINTENANCE: ICD-10-CM

## 2019-11-18 DIAGNOSIS — Z09 POSTOP CHECK: Primary | ICD-10-CM

## 2019-11-18 DIAGNOSIS — Z78.0 MENOPAUSE: ICD-10-CM

## 2019-11-18 PROCEDURE — 99024 PR POST-OP FOLLOW-UP VISIT: ICD-10-PCS | Mod: S$GLB,,, | Performed by: OBSTETRICS & GYNECOLOGY

## 2019-11-18 PROCEDURE — 99999 PR PBB SHADOW E&M-EST. PATIENT-LVL III: ICD-10-PCS | Mod: PBBFAC,,, | Performed by: OBSTETRICS & GYNECOLOGY

## 2019-11-18 PROCEDURE — 99024 POSTOP FOLLOW-UP VISIT: CPT | Mod: S$GLB,,, | Performed by: OBSTETRICS & GYNECOLOGY

## 2019-11-18 PROCEDURE — 99999 PR PBB SHADOW E&M-EST. PATIENT-LVL III: CPT | Mod: PBBFAC,,, | Performed by: OBSTETRICS & GYNECOLOGY

## 2019-11-18 RX ORDER — ESTRADIOL 0.1 MG/G
CREAM VAGINAL
Qty: 42.5 G | Refills: 2 | Status: SHIPPED | OUTPATIENT
Start: 2019-11-18 | End: 2020-06-02

## 2019-11-18 NOTE — PROGRESS NOTES
Subjective:       Patient ID: Radha Morales is a 79 y.o. female.    Chief Complaint:  Post-op Evaluation (Hyst on 19)      History of Present Illness  HPI  Ms Morales is a 79 years old, status post total vaginal hysterectomy with Porras culdoplasty on 2019.  She comes in today for an exam and followup.  Patient has no current complaints.  No fever or chills.  No nausea or vomiting.  No diarrhea or constipation.  No abdominal or pelvic pain.    She has NOT resumed normal intercourse.  Patient has begun some walking for exercise. She denies having signs and symptoms of significant depression.  She is tolerating oral intake well.  Using Estrace cream twice a week.  Doing well.      GYN & OB History  No LMP recorded. Patient is postmenopausal.   Date of Last Pap: No result found    OB History    Para Term  AB Living   7 7 7 0 0 7   SAB TAB Ectopic Multiple Live Births                  # Outcome Date GA Lbr Kunal/2nd Weight Sex Delivery Anes PTL Lv   7 Term            6 Term            5 Term            4 Term            3 Term            2 Term            1 Term              Past Medical History:   Diagnosis Date    Arthritis     Diabetes mellitus     Hypertension        Past Surgical History:   Procedure Laterality Date    CATARACT EXTRACTION Bilateral     CERVIX REMOVAL N/A 2019    Procedure: Removal of cervix with culdoplasty ;  Surgeon: Sundar Alfonso MD;  Location: [facility] OR;  Service: OB/GYN;  Laterality: N/A;    COLONOSCOPY N/A 3/31/2017    Procedure: COLONOSCOPY;  Surgeon: Apolinar Matute MD;  Location: [facility] ENDO;  Service: Endoscopy;  Laterality: N/A;    HYSTERECTOMY  1995    OVARY SURGERY      TOTAL VAGINAL HYSTERECTOMY  2019    Procedure: HYSTERECTOMY, TOTAL, VAGINAL;  Surgeon: Sundar Alfonso MD;  Location: [facility] OR;  Service: OB/GYN;;       Family History   Problem Relation Age of Onset    Diabetes Father     Diabetes Mother        Social History     Socioeconomic  History    Marital status:      Spouse name: Not on file    Number of children: Not on file    Years of education: Not on file    Highest education level: Not on file   Occupational History    Not on file   Social Needs    Financial resource strain: Not on file    Food insecurity:     Worry: Not on file     Inability: Not on file    Transportation needs:     Medical: Not on file     Non-medical: Not on file   Tobacco Use    Smoking status: Never Smoker    Smokeless tobacco: Never Used   Substance and Sexual Activity    Alcohol use: No    Drug use: No    Sexual activity: Never     Birth control/protection: None   Lifestyle    Physical activity:     Days per week: Not on file     Minutes per session: Not on file    Stress: Not on file   Relationships    Social connections:     Talks on phone: Not on file     Gets together: Not on file     Attends Christian service: Not on file     Active member of club or organization: Not on file     Attends meetings of clubs or organizations: Not on file     Relationship status: Not on file   Other Topics Concern    Not on file   Social History Narrative    .  .    Ex-     Lives alone       Current Outpatient Medications   Medication Sig Dispense Refill    ammonium lactate 12 % Crea   10    aspirin (ECOTRIN) 81 MG EC tablet Take 81 mg by mouth once daily.      chlorthalidone (HYGROTEN) 25 MG Tab Take 25 mg by mouth once daily.      estradiol (ESTRACE) 0.01 % (0.1 mg/gram) vaginal cream PLACE 1 GRAM VAGINALLY EVERY DAY 42.5 g 2    ibuprofen (ADVIL,MOTRIN) 600 MG tablet Take 1 tablet (600 mg total) by mouth every 6 (six) hours as needed for Pain. 30 tablet 1    latanoprost 0.005 % ophthalmic solution INSTILL 1 DROP INTO BOTH EYES QHS  3    losartan (COZAAR) 100 MG tablet TK 1 T PO D  0    meloxicam (MOBIC) 7.5 MG tablet Take 7.5 mg by mouth once daily.      metformin (GLUCOPHAGE) 1000 MG tablet Take 1,000 mg by mouth 2  (two) times daily with meals.      multivit,iron,minerals/lutein (CENTRUM SILVER ULTRA WOMEN'S ORAL) Take by mouth.      oxyCODONE-acetaminophen (PERCOCET) 5-325 mg per tablet Take 1 tablet by mouth every 4 (four) hours as needed for Pain. 15 tablet 0    rosuvastatin (CRESTOR) 5 MG tablet   3     No current facility-administered medications for this visit.        Review of patient's allergies indicates:  No Known Allergies    Review of Systems  Review of Systems   Constitutional: Positive for fatigue. Negative for activity change, appetite change, fever and unexpected weight change.   Respiratory: Negative for cough, shortness of breath and wheezing.    Cardiovascular: Negative for chest pain and palpitations.   Gastrointestinal: Positive for abdominal pain and nausea. Negative for vomiting.   Endocrine: Negative for hot flashes.   Genitourinary: Positive for frequency, pelvic pain and vaginal discharge. Negative for dysmenorrhea, dyspareunia, urgency, vaginal bleeding and postcoital bleeding.   Musculoskeletal: Positive for back pain. Negative for myalgias.   Integumentary:  Negative for rash, breast mass and nipple discharge.   Neurological: Positive for headaches. Negative for seizures.   Psychiatric/Behavioral: Negative for depression and sleep disturbance. The patient is not nervous/anxious.    Breast: Negative for mass, mastodynia and nipple discharge          Objective:    Physical Exam:   Constitutional: She appears well-developed and well-nourished. No distress.    HENT:   Head: Normocephalic and atraumatic.    Eyes: EOM are normal.    Neck: Normal range of motion.     Pulmonary/Chest: Effort normal. No respiratory distress.   Breasts: Non-tender, no engorgement, no masses, no retraction, no discharge. Negative for lymphadenopathy.         Abdominal: Soft. She exhibits no distension. There is no tenderness. There is no rebound and no guarding.     Genitourinary: Vagina normal. No vaginal discharge found.    Genitourinary Comments: Moderate atrophy.  Vulva without any obvious lesions.  Urethral meatus normal size and location without any lesion.  Urethra is non-tender without stricture or discharge.  Bladder is non-tender.  Vaginal vault with good support with grade 2 cystocele and grade 1 rectocele. Good apical support.   Minimal white discharge noted.  No obvious lesion.  Minimal rugation.  Vaginal cuff intact and well-supported.  Cervix and Uterus are surgically absent.  Adnexa is without any masses or tenderness.  Perineum without obvious lesion.             Musculoskeletal: Normal range of motion.       Neurological: She is alert.    Skin: Skin is warm and dry.    Psychiatric: She has a normal mood and affect.          Assessment:        1.  Postop check  2.  Menopause  3.  Status post vaginal hysterectomy         Plan:      I have discussed with the patient regarding her condition  She has recovered well from her surgery.    Continue with Estrace cream twice a week  Back in about 6-12 months.    *She is getting her right knee surgery this coming 12/6/2019

## 2019-11-27 ENCOUNTER — HOSPITAL ENCOUNTER (OUTPATIENT)
Dept: PREADMISSION TESTING | Facility: HOSPITAL | Age: 79
Discharge: HOME | End: 2019-11-27
Attending: ORTHOPAEDIC SURGERY
Payer: MEDICARE

## 2019-11-27 VITALS
DIASTOLIC BLOOD PRESSURE: 76 MMHG | HEART RATE: 76 BPM | SYSTOLIC BLOOD PRESSURE: 142 MMHG | HEIGHT: 65 IN | RESPIRATION RATE: 16 BRPM | TEMPERATURE: 98 F | BODY MASS INDEX: 29.02 KG/M2 | WEIGHT: 174.19 LBS | OXYGEN SATURATION: 99 %

## 2019-11-27 DIAGNOSIS — Z01.818 PREOP TESTING: Primary | ICD-10-CM

## 2019-11-27 DIAGNOSIS — E08.36 DIABETES MELLITUS DUE TO UNDERLYING CONDITION WITH DIABETIC CATARACT: ICD-10-CM

## 2019-11-27 LAB
ALBUMIN SERPL BCP-MCNC: 4 G/DL (ref 3.5–5.2)
ALP SERPL-CCNC: 51 U/L (ref 55–135)
ALT SERPL W/O P-5'-P-CCNC: 14 U/L (ref 10–44)
ANION GAP SERPL CALC-SCNC: 9 MMOL/L (ref 8–16)
AST SERPL-CCNC: 21 U/L (ref 10–40)
BASOPHILS # BLD AUTO: 0.08 K/UL (ref 0–0.2)
BASOPHILS NFR BLD: 1.4 % (ref 0–1.9)
BILIRUB SERPL-MCNC: 0.7 MG/DL (ref 0.1–1)
BUN SERPL-MCNC: 13 MG/DL (ref 8–23)
CALCIUM SERPL-MCNC: 9.7 MG/DL (ref 8.7–10.5)
CHLORIDE SERPL-SCNC: 97 MMOL/L (ref 95–110)
CO2 SERPL-SCNC: 27 MMOL/L (ref 23–29)
CREAT SERPL-MCNC: 0.9 MG/DL (ref 0.5–1.4)
DIFFERENTIAL METHOD: ABNORMAL
EOSINOPHIL # BLD AUTO: 0.1 K/UL (ref 0–0.5)
EOSINOPHIL NFR BLD: 1.7 % (ref 0–8)
ERYTHROCYTE [DISTWIDTH] IN BLOOD BY AUTOMATED COUNT: 13.7 % (ref 11.5–14.5)
EST. GFR  (AFRICAN AMERICAN): >60 ML/MIN/1.73 M^2
EST. GFR  (NON AFRICAN AMERICAN): >60 ML/MIN/1.73 M^2
ESTIMATED AVG GLUCOSE: 117 MG/DL (ref 68–131)
GLUCOSE SERPL-MCNC: 94 MG/DL (ref 70–110)
HBA1C MFR BLD HPLC: 5.7 % (ref 4–5.6)
HCT VFR BLD AUTO: 36.6 % (ref 37–48.5)
HGB BLD-MCNC: 11.9 G/DL (ref 12–16)
IMM GRANULOCYTES # BLD AUTO: 0.02 K/UL (ref 0–0.04)
IMM GRANULOCYTES NFR BLD AUTO: 0.3 % (ref 0–0.5)
LYMPHOCYTES # BLD AUTO: 1.5 K/UL (ref 1–4.8)
LYMPHOCYTES NFR BLD: 26.6 % (ref 18–48)
MCH RBC QN AUTO: 28.5 PG (ref 27–31)
MCHC RBC AUTO-ENTMCNC: 32.5 G/DL (ref 32–36)
MCV RBC AUTO: 88 FL (ref 82–98)
MONOCYTES # BLD AUTO: 0.7 K/UL (ref 0.3–1)
MONOCYTES NFR BLD: 11.7 % (ref 4–15)
NEUTROPHILS # BLD AUTO: 3.4 K/UL (ref 1.8–7.7)
NEUTROPHILS NFR BLD: 58.3 % (ref 38–73)
NRBC BLD-RTO: 0 /100 WBC
PLATELET # BLD AUTO: 244 K/UL (ref 150–350)
PMV BLD AUTO: 9.4 FL (ref 9.2–12.9)
POTASSIUM SERPL-SCNC: 4.3 MMOL/L (ref 3.5–5.1)
PROT SERPL-MCNC: 7.9 G/DL (ref 6–8.4)
RBC # BLD AUTO: 4.17 M/UL (ref 4–5.4)
SODIUM SERPL-SCNC: 133 MMOL/L (ref 136–145)
WBC # BLD AUTO: 5.75 K/UL (ref 3.9–12.7)

## 2019-11-27 PROCEDURE — 85025 COMPLETE CBC W/AUTO DIFF WBC: CPT

## 2019-11-27 PROCEDURE — 36415 COLL VENOUS BLD VENIPUNCTURE: CPT

## 2019-11-27 PROCEDURE — 83036 HEMOGLOBIN GLYCOSYLATED A1C: CPT

## 2019-11-27 PROCEDURE — 80053 COMPREHEN METABOLIC PANEL: CPT

## 2019-11-27 RX ORDER — AMLODIPINE AND BENAZEPRIL HYDROCHLORIDE 10; 20 MG/1; MG/1
1 CAPSULE ORAL DAILY
COMMUNITY

## 2019-11-27 NOTE — PROGRESS NOTES
[facility]   Attendance Note      Radha Morales  [id number]    Past Medical History:   Diagnosis Date    Arthritis     Diabetes mellitus     Hypertension        Occupational Profile:    · Living Environment: Pt resides alone in a 2 story home with no steps to enter. Pt states that her bedroom is located on the first floor of home    · Previous Level of Function: Pt is Modified independent using a rolling walker. ( Pt is hard of hearing)    · Equipment used at home: Rolling walker is used in the home  · Additional equipment: raised toilet seat and shower chair    · Assistance upon discharge: daughter and other family will provide assistance    Barriers to discharge: family does not reside with pt    Handouts/Education Provided:  Pt received written handout with [facility] for TKR       Elly Mota OT  11/27/2019

## 2019-11-27 NOTE — DISCHARGE INSTRUCTIONS
"Your procedure  is scheduled for _Friday, 12/6/19_________.    Call [phone] between 2pm and 5pm on __Thursday, 12/5/19_____to find out your arrival time for the day of surgery.    Report to Same Day Surgery Unit at _________ AM on the 2nd floor of the hospital.  Use the front entrance of the hospital.  The front doors of the hospital open promptly at 5:30am.  If you need wheelchair assistance, call [phone] from your cell phone, or call "0" from the courtesy phone in the lobby.    Important instructions:   Do not eat or drink after 12 midnight, including water.  It is okay to brush your teeth.  Do not have gum, candy or mints.     Take only these medications with a small swallow of water on the morning of your surgery ___none___________    Do not take any diabetic medication on the morning of surgery unless instructed to do so by your doctor or pre op nurse.    Stop taking Aspirin, Ibuprofen, Motrin and Aleve , Fish oil, and Vitamin E, meloxicam for at least 7 days before your surgery. You may use Tylenol unless otherwise instructed by your doctor.           Prep instructions: SHOWER   OTHER_____________     Please shower the night before and the morning of your surgery.        Use Hibiclens soap as instructed by your pre op nurse.   Please place clean linens on your bed the night before surgery. Please wear fresh clean clothing after each shower.     No shaving of procedural area at least 4-5 days before surgery due to increased risk of skin irritation and/or possible infection.      You may be asked to take a third shower on arrival to Same Day Surgery depending on the type of surgery you are having.     Do not wear make- up, including mascara.     You may wear deodorant only.      Do not wear powder, body lotion or perfume/cologne.     Do not wear any jewelry or have any metal on your body.     You will be asked to remove any dentures or partials for the procedure.     Please bring any documents " given to you by your doctor.     If you are going home on the same day of surgery, you must arrange for a family member or a friend to drive you home.  Public transportation is prohibited.  You will not be able to drive home if you were given anesthesia or sedation.     Children under 18 years of age require a parent/guardian present the entire time that they are here.     Wear loose fitting clothes allowing for bandages.     Please leave money and valuables home.       You may bring your cell phone.     Call the doctor if fever or illness should occur before your surgery.    Call [phone] to contact us here if needed.

## 2019-12-05 ENCOUNTER — ANESTHESIA EVENT (OUTPATIENT)
Dept: SURGERY | Facility: HOSPITAL | Age: 79
End: 2019-12-05
Payer: MEDICARE

## 2019-12-06 ENCOUNTER — ANESTHESIA (OUTPATIENT)
Dept: SURGERY | Facility: HOSPITAL | Age: 79
End: 2019-12-06
Payer: MEDICARE

## 2019-12-06 ENCOUNTER — HOSPITAL ENCOUNTER (OUTPATIENT)
Facility: HOSPITAL | Age: 79
Discharge: HOME | End: 2019-12-07
Attending: ORTHOPAEDIC SURGERY | Admitting: ORTHOPAEDIC SURGERY
Payer: MEDICARE

## 2019-12-06 DIAGNOSIS — M17.11 OSTEOARTHRITIS OF RIGHT KNEE, UNSPECIFIED OSTEOARTHRITIS TYPE: ICD-10-CM

## 2019-12-06 DIAGNOSIS — M17.11 PRIMARY OSTEOARTHRITIS OF RIGHT KNEE: Primary | ICD-10-CM

## 2019-12-06 LAB
ANION GAP SERPL CALC-SCNC: 8 MMOL/L (ref 8–16)
ANION GAP SERPL CALC-SCNC: 9 MMOL/L (ref 8–16)
BASOPHILS # BLD AUTO: 0.03 K/UL (ref 0–0.2)
BASOPHILS # BLD AUTO: 0.05 K/UL (ref 0–0.2)
BASOPHILS NFR BLD: 0.3 % (ref 0–1.9)
BASOPHILS NFR BLD: 0.5 % (ref 0–1.9)
BUN SERPL-MCNC: 11 MG/DL (ref 8–23)
BUN SERPL-MCNC: 12 MG/DL (ref 8–23)
CALCIUM SERPL-MCNC: 9.2 MG/DL (ref 8.7–10.5)
CALCIUM SERPL-MCNC: 9.4 MG/DL (ref 8.7–10.5)
CHLORIDE SERPL-SCNC: 103 MMOL/L (ref 95–110)
CHLORIDE SERPL-SCNC: 98 MMOL/L (ref 95–110)
CO2 SERPL-SCNC: 27 MMOL/L (ref 23–29)
CO2 SERPL-SCNC: 28 MMOL/L (ref 23–29)
CREAT SERPL-MCNC: 0.8 MG/DL (ref 0.5–1.4)
CREAT SERPL-MCNC: 0.9 MG/DL (ref 0.5–1.4)
DIFFERENTIAL METHOD: ABNORMAL
DIFFERENTIAL METHOD: ABNORMAL
EOSINOPHIL # BLD AUTO: 0 K/UL (ref 0–0.5)
EOSINOPHIL # BLD AUTO: 0.1 K/UL (ref 0–0.5)
EOSINOPHIL NFR BLD: 0.1 % (ref 0–8)
EOSINOPHIL NFR BLD: 0.7 % (ref 0–8)
ERYTHROCYTE [DISTWIDTH] IN BLOOD BY AUTOMATED COUNT: 14.1 % (ref 11.5–14.5)
ERYTHROCYTE [DISTWIDTH] IN BLOOD BY AUTOMATED COUNT: 14.2 % (ref 11.5–14.5)
EST. GFR  (AFRICAN AMERICAN): >60 ML/MIN/1.73 M^2
EST. GFR  (AFRICAN AMERICAN): >60 ML/MIN/1.73 M^2
EST. GFR  (NON AFRICAN AMERICAN): >60 ML/MIN/1.73 M^2
EST. GFR  (NON AFRICAN AMERICAN): >60 ML/MIN/1.73 M^2
GLUCOSE SERPL-MCNC: 220 MG/DL (ref 70–110)
GLUCOSE SERPL-MCNC: 91 MG/DL (ref 70–110)
HCT VFR BLD AUTO: 34.4 % (ref 37–48.5)
HCT VFR BLD AUTO: 37.3 % (ref 37–48.5)
HGB BLD-MCNC: 10.7 G/DL (ref 12–16)
HGB BLD-MCNC: 11.7 G/DL (ref 12–16)
IMM GRANULOCYTES # BLD AUTO: 0.03 K/UL (ref 0–0.04)
IMM GRANULOCYTES # BLD AUTO: 0.03 K/UL (ref 0–0.04)
IMM GRANULOCYTES NFR BLD AUTO: 0.3 % (ref 0–0.5)
IMM GRANULOCYTES NFR BLD AUTO: 0.3 % (ref 0–0.5)
LYMPHOCYTES # BLD AUTO: 0.6 K/UL (ref 1–4.8)
LYMPHOCYTES # BLD AUTO: 2.1 K/UL (ref 1–4.8)
LYMPHOCYTES NFR BLD: 21.2 % (ref 18–48)
LYMPHOCYTES NFR BLD: 6.2 % (ref 18–48)
MCH RBC QN AUTO: 27.6 PG (ref 27–31)
MCH RBC QN AUTO: 27.7 PG (ref 27–31)
MCHC RBC AUTO-ENTMCNC: 31.1 G/DL (ref 32–36)
MCHC RBC AUTO-ENTMCNC: 31.4 G/DL (ref 32–36)
MCV RBC AUTO: 88 FL (ref 82–98)
MCV RBC AUTO: 89 FL (ref 82–98)
MONOCYTES # BLD AUTO: 0.7 K/UL (ref 0.3–1)
MONOCYTES # BLD AUTO: 1.1 K/UL (ref 0.3–1)
MONOCYTES NFR BLD: 11.3 % (ref 4–15)
MONOCYTES NFR BLD: 7.1 % (ref 4–15)
NEUTROPHILS # BLD AUTO: 6.5 K/UL (ref 1.8–7.7)
NEUTROPHILS # BLD AUTO: 7.9 K/UL (ref 1.8–7.7)
NEUTROPHILS NFR BLD: 66 % (ref 38–73)
NEUTROPHILS NFR BLD: 86 % (ref 38–73)
NRBC BLD-RTO: 0 /100 WBC
NRBC BLD-RTO: 0 /100 WBC
PLATELET # BLD AUTO: 234 K/UL (ref 150–350)
PLATELET # BLD AUTO: 248 K/UL (ref 150–350)
PMV BLD AUTO: 9.2 FL (ref 9.2–12.9)
PMV BLD AUTO: 9.3 FL (ref 9.2–12.9)
POCT GLUCOSE: 111 MG/DL (ref 70–110)
POCT GLUCOSE: 119 MG/DL (ref 70–110)
POCT GLUCOSE: 158 MG/DL (ref 70–110)
POCT GLUCOSE: 167 MG/DL (ref 70–110)
POCT GLUCOSE: 194 MG/DL (ref 70–110)
POCT GLUCOSE: 93 MG/DL (ref 70–110)
POTASSIUM SERPL-SCNC: 4.3 MMOL/L (ref 3.5–5.1)
POTASSIUM SERPL-SCNC: 4.3 MMOL/L (ref 3.5–5.1)
RBC # BLD AUTO: 3.86 M/UL (ref 4–5.4)
RBC # BLD AUTO: 4.24 M/UL (ref 4–5.4)
SODIUM SERPL-SCNC: 135 MMOL/L (ref 136–145)
SODIUM SERPL-SCNC: 138 MMOL/L (ref 136–145)
WBC # BLD AUTO: 9.24 K/UL (ref 3.9–12.7)
WBC # BLD AUTO: 9.82 K/UL (ref 3.9–12.7)

## 2019-12-06 PROCEDURE — 63600175 PHARM REV CODE 636 W HCPCS: Performed by: ORTHOPAEDIC SURGERY

## 2019-12-06 PROCEDURE — 27201423 OPTIME MED/SURG SUP & DEVICES STERILE SUPPLY: Performed by: ORTHOPAEDIC SURGERY

## 2019-12-06 PROCEDURE — 25000003 PHARM REV CODE 250: Performed by: ORTHOPAEDIC SURGERY

## 2019-12-06 PROCEDURE — D9220A PRA ANESTHESIA: Mod: ,,, | Performed by: ANESTHESIOLOGY

## 2019-12-06 PROCEDURE — 94761 N-INVAS EAR/PLS OXIMETRY MLT: CPT

## 2019-12-06 PROCEDURE — D9220A PRA ANESTHESIA: ICD-10-PCS | Mod: ,,, | Performed by: ANESTHESIOLOGY

## 2019-12-06 PROCEDURE — 36000710: Performed by: ORTHOPAEDIC SURGERY

## 2019-12-06 PROCEDURE — 71000039 HC RECOVERY, EACH ADD'L HOUR: Performed by: ORTHOPAEDIC SURGERY

## 2019-12-06 PROCEDURE — 63600175 PHARM REV CODE 636 W HCPCS: Performed by: NURSE ANESTHETIST, CERTIFIED REGISTERED

## 2019-12-06 PROCEDURE — 97161 PT EVAL LOW COMPLEX 20 MIN: CPT

## 2019-12-06 PROCEDURE — 85025 COMPLETE CBC W/AUTO DIFF WBC: CPT | Mod: 91

## 2019-12-06 PROCEDURE — S0020 INJECTION, BUPIVICAINE HYDRO: HCPCS | Performed by: ORTHOPAEDIC SURGERY

## 2019-12-06 PROCEDURE — 36415 COLL VENOUS BLD VENIPUNCTURE: CPT

## 2019-12-06 PROCEDURE — 25000003 PHARM REV CODE 250: Performed by: ANESTHESIOLOGY

## 2019-12-06 PROCEDURE — C1713 ANCHOR/SCREW BN/BN,TIS/BN: HCPCS | Performed by: ORTHOPAEDIC SURGERY

## 2019-12-06 PROCEDURE — A4216 STERILE WATER/SALINE, 10 ML: HCPCS | Performed by: ORTHOPAEDIC SURGERY

## 2019-12-06 PROCEDURE — 82962 GLUCOSE BLOOD TEST: CPT | Performed by: ORTHOPAEDIC SURGERY

## 2019-12-06 PROCEDURE — 36000711: Performed by: ORTHOPAEDIC SURGERY

## 2019-12-06 PROCEDURE — 71000033 HC RECOVERY, INTIAL HOUR: Performed by: ORTHOPAEDIC SURGERY

## 2019-12-06 PROCEDURE — 80048 BASIC METABOLIC PNL TOTAL CA: CPT | Mod: 91

## 2019-12-06 PROCEDURE — 37000009 HC ANESTHESIA EA ADD 15 MINS: Performed by: ORTHOPAEDIC SURGERY

## 2019-12-06 PROCEDURE — 97110 THERAPEUTIC EXERCISES: CPT

## 2019-12-06 PROCEDURE — S0020 INJECTION, BUPIVICAINE HYDRO: HCPCS | Performed by: ANESTHESIOLOGY

## 2019-12-06 PROCEDURE — C1776 JOINT DEVICE (IMPLANTABLE): HCPCS | Performed by: ORTHOPAEDIC SURGERY

## 2019-12-06 PROCEDURE — 37000008 HC ANESTHESIA 1ST 15 MINUTES: Performed by: ORTHOPAEDIC SURGERY

## 2019-12-06 PROCEDURE — 63600175 PHARM REV CODE 636 W HCPCS: Performed by: ANESTHESIOLOGY

## 2019-12-06 PROCEDURE — C9290 INJ, BUPIVACAINE LIPOSOME: HCPCS | Performed by: ORTHOPAEDIC SURGERY

## 2019-12-06 PROCEDURE — 97165 OT EVAL LOW COMPLEX 30 MIN: CPT

## 2019-12-06 DEVICE — BASEPLATE SZ 3 TRI TS IMPLANT: Type: IMPLANTABLE DEVICE | Site: KNEE | Status: FUNCTIONAL

## 2019-12-06 DEVICE — COMP FEM POST STAB CEM SZ 4 RT: Type: IMPLANTABLE DEVICE | Site: KNEE | Status: FUNCTIONAL

## 2019-12-06 DEVICE — CEMENT BONE SMPLX HV GENTMYCN: Type: IMPLANTABLE DEVICE | Site: KNEE | Status: FUNCTIONAL

## 2019-12-06 DEVICE — TIBIAL POST STAB SZ 3 11X3 POL: Type: IMPLANTABLE DEVICE | Site: KNEE | Status: FUNCTIONAL

## 2019-12-06 DEVICE — PATELLA TRI 29X8 X3 POLYETHYLE: Type: IMPLANTABLE DEVICE | Site: KNEE | Status: FUNCTIONAL

## 2019-12-06 RX ORDER — TRANEXAMIC ACID 100 MG/ML
1000 INJECTION, SOLUTION INTRAVENOUS
Status: COMPLETED | OUTPATIENT
Start: 2019-12-06 | End: 2019-12-06

## 2019-12-06 RX ORDER — INSULIN ASPART 100 [IU]/ML
1-10 INJECTION, SOLUTION INTRAVENOUS; SUBCUTANEOUS
Status: DISCONTINUED | OUTPATIENT
Start: 2019-12-06 | End: 2019-12-07 | Stop reason: HOSPADM

## 2019-12-06 RX ORDER — CEFAZOLIN SODIUM 2 G/50ML
2 SOLUTION INTRAVENOUS
Status: COMPLETED | OUTPATIENT
Start: 2019-12-06 | End: 2019-12-06

## 2019-12-06 RX ORDER — SODIUM CHLORIDE 0.9 % (FLUSH) 0.9 %
10 SYRINGE (ML) INJECTION
Status: DISCONTINUED | OUTPATIENT
Start: 2019-12-06 | End: 2019-12-07 | Stop reason: HOSPADM

## 2019-12-06 RX ORDER — CHLORTHALIDONE 25 MG/1
25 TABLET ORAL DAILY
Status: DISCONTINUED | OUTPATIENT
Start: 2019-12-06 | End: 2019-12-07 | Stop reason: HOSPADM

## 2019-12-06 RX ORDER — PROPOFOL 10 MG/ML
VIAL (ML) INTRAVENOUS
Status: DISCONTINUED | OUTPATIENT
Start: 2019-12-06 | End: 2019-12-06

## 2019-12-06 RX ORDER — POLYETHYLENE GLYCOL 3350 17 G/17G
17 POWDER, FOR SOLUTION ORAL DAILY
Status: DISCONTINUED | OUTPATIENT
Start: 2019-12-06 | End: 2019-12-07 | Stop reason: HOSPADM

## 2019-12-06 RX ORDER — LIDOCAINE HYDROCHLORIDE 10 MG/ML
1 INJECTION, SOLUTION EPIDURAL; INFILTRATION; INTRACAUDAL; PERINEURAL ONCE
Status: DISCONTINUED | OUTPATIENT
Start: 2019-12-06 | End: 2019-12-06 | Stop reason: HOSPADM

## 2019-12-06 RX ORDER — PHENYLEPHRINE HYDROCHLORIDE 10 MG/ML
INJECTION INTRAVENOUS
Status: DISCONTINUED | OUTPATIENT
Start: 2019-12-06 | End: 2019-12-06

## 2019-12-06 RX ORDER — OXYCODONE HYDROCHLORIDE 5 MG/1
5 TABLET ORAL EVERY 4 HOURS PRN
Status: DISCONTINUED | OUTPATIENT
Start: 2019-12-06 | End: 2019-12-07 | Stop reason: HOSPADM

## 2019-12-06 RX ORDER — CEFADROXIL 500 MG/1
500 CAPSULE ORAL EVERY 12 HOURS
Qty: 14 CAPSULE | Refills: 0 | Status: SHIPPED | OUTPATIENT
Start: 2019-12-06

## 2019-12-06 RX ORDER — LIDOCAINE HCL/PF 100 MG/5ML
SYRINGE (ML) INTRAVENOUS
Status: DISCONTINUED | OUTPATIENT
Start: 2019-12-06 | End: 2019-12-06

## 2019-12-06 RX ORDER — FENTANYL CITRATE 50 UG/ML
INJECTION, SOLUTION INTRAMUSCULAR; INTRAVENOUS
Status: DISCONTINUED | OUTPATIENT
Start: 2019-12-06 | End: 2019-12-06

## 2019-12-06 RX ORDER — OXYCODONE HYDROCHLORIDE 5 MG/1
10 TABLET ORAL EVERY 4 HOURS PRN
Status: DISCONTINUED | OUTPATIENT
Start: 2019-12-06 | End: 2019-12-07 | Stop reason: HOSPADM

## 2019-12-06 RX ORDER — OXYCODONE AND ACETAMINOPHEN 5; 325 MG/1; MG/1
1 TABLET ORAL EVERY 4 HOURS PRN
Qty: 41 TABLET | Refills: 0 | Status: SHIPPED | OUTPATIENT
Start: 2019-12-06 | End: 2020-07-07 | Stop reason: SDUPTHER

## 2019-12-06 RX ORDER — AMLODIPINE AND BENAZEPRIL HYDROCHLORIDE 10; 20 MG/1; MG/1
1 CAPSULE ORAL DAILY
Status: DISCONTINUED | OUTPATIENT
Start: 2019-12-06 | End: 2019-12-06

## 2019-12-06 RX ORDER — ROSUVASTATIN CALCIUM 5 MG/1
5 TABLET, COATED ORAL DAILY
Status: DISCONTINUED | OUTPATIENT
Start: 2019-12-06 | End: 2019-12-07 | Stop reason: HOSPADM

## 2019-12-06 RX ORDER — LATANOPROST 50 UG/ML
1 SOLUTION/ DROPS OPHTHALMIC NIGHTLY
Status: DISCONTINUED | OUTPATIENT
Start: 2019-12-06 | End: 2019-12-07 | Stop reason: HOSPADM

## 2019-12-06 RX ORDER — ASPIRIN 81 MG/1
81 TABLET ORAL 2 TIMES DAILY WITH MEALS
Qty: 58 TABLET | Refills: 0 | Status: SHIPPED | OUTPATIENT
Start: 2019-12-06 | End: 2020-12-05

## 2019-12-06 RX ORDER — DOCUSATE SODIUM 100 MG/1
100 CAPSULE, LIQUID FILLED ORAL 2 TIMES DAILY PRN
Qty: 21 CAPSULE | Refills: 1 | Status: SHIPPED | OUTPATIENT
Start: 2019-12-06 | End: 2019-12-16

## 2019-12-06 RX ORDER — SODIUM CHLORIDE 9 MG/ML
INJECTION, SOLUTION INTRAVENOUS CONTINUOUS
Status: DISCONTINUED | OUTPATIENT
Start: 2019-12-06 | End: 2019-12-06

## 2019-12-06 RX ORDER — IBUPROFEN 200 MG
24 TABLET ORAL
Status: DISCONTINUED | OUTPATIENT
Start: 2019-12-06 | End: 2019-12-07 | Stop reason: HOSPADM

## 2019-12-06 RX ORDER — BUPIVACAINE HYDROCHLORIDE 5 MG/ML
INJECTION, SOLUTION EPIDURAL; INTRACAUDAL
Status: COMPLETED | OUTPATIENT
Start: 2019-12-06 | End: 2019-12-06

## 2019-12-06 RX ORDER — SODIUM CHLORIDE, SODIUM LACTATE, POTASSIUM CHLORIDE, CALCIUM CHLORIDE 600; 310; 30; 20 MG/100ML; MG/100ML; MG/100ML; MG/100ML
INJECTION, SOLUTION INTRAVENOUS CONTINUOUS
Status: ACTIVE | OUTPATIENT
Start: 2019-12-06 | End: 2019-12-06

## 2019-12-06 RX ORDER — SODIUM CHLORIDE 0.9 % (FLUSH) 0.9 %
SYRINGE (ML) INJECTION
Status: DISCONTINUED | OUTPATIENT
Start: 2019-12-06 | End: 2019-12-06 | Stop reason: HOSPADM

## 2019-12-06 RX ORDER — HYDROMORPHONE HYDROCHLORIDE 2 MG/ML
0.2 INJECTION, SOLUTION INTRAMUSCULAR; INTRAVENOUS; SUBCUTANEOUS EVERY 5 MIN PRN
Status: DISCONTINUED | OUTPATIENT
Start: 2019-12-06 | End: 2019-12-06 | Stop reason: HOSPADM

## 2019-12-06 RX ORDER — BUPIVACAINE HYDROCHLORIDE 2.5 MG/ML
INJECTION, SOLUTION INFILTRATION; PERINEURAL
Status: DISCONTINUED | OUTPATIENT
Start: 2019-12-06 | End: 2019-12-06 | Stop reason: HOSPADM

## 2019-12-06 RX ORDER — SODIUM CHLORIDE, SODIUM LACTATE, POTASSIUM CHLORIDE, CALCIUM CHLORIDE 600; 310; 30; 20 MG/100ML; MG/100ML; MG/100ML; MG/100ML
INJECTION, SOLUTION INTRAVENOUS CONTINUOUS PRN
Status: DISCONTINUED | OUTPATIENT
Start: 2019-12-06 | End: 2019-12-06

## 2019-12-06 RX ORDER — ASPIRIN 81 MG/1
81 TABLET ORAL 2 TIMES DAILY WITH MEALS
Qty: 58 TABLET | Refills: 0 | Status: SHIPPED | OUTPATIENT
Start: 2019-12-06 | End: 2019-12-07 | Stop reason: HOSPADM

## 2019-12-06 RX ORDER — TRANEXAMIC ACID 100 MG/ML
1000 INJECTION, SOLUTION INTRAVENOUS
Status: DISCONTINUED | OUTPATIENT
Start: 2019-12-06 | End: 2019-12-06 | Stop reason: HOSPADM

## 2019-12-06 RX ORDER — SODIUM CHLORIDE 0.9 % (FLUSH) 0.9 %
10 SYRINGE (ML) INJECTION
Status: DISCONTINUED | OUTPATIENT
Start: 2019-12-06 | End: 2019-12-06 | Stop reason: HOSPADM

## 2019-12-06 RX ORDER — ACETAMINOPHEN 500 MG
1000 TABLET ORAL EVERY 8 HOURS
Status: DISCONTINUED | OUTPATIENT
Start: 2019-12-06 | End: 2019-12-07 | Stop reason: HOSPADM

## 2019-12-06 RX ORDER — MUPIROCIN 20 MG/G
1 OINTMENT TOPICAL 2 TIMES DAILY
Status: DISCONTINUED | OUTPATIENT
Start: 2019-12-06 | End: 2019-12-07 | Stop reason: HOSPADM

## 2019-12-06 RX ORDER — ACETAMINOPHEN 10 MG/ML
1000 INJECTION, SOLUTION INTRAVENOUS ONCE
Status: COMPLETED | OUTPATIENT
Start: 2019-12-06 | End: 2019-12-06

## 2019-12-06 RX ORDER — AMLODIPINE BESYLATE 5 MG/1
10 TABLET ORAL DAILY
Status: DISCONTINUED | OUTPATIENT
Start: 2019-12-06 | End: 2019-12-07 | Stop reason: HOSPADM

## 2019-12-06 RX ORDER — ONDANSETRON 2 MG/ML
4 INJECTION INTRAMUSCULAR; INTRAVENOUS EVERY 12 HOURS PRN
Status: DISCONTINUED | OUTPATIENT
Start: 2019-12-06 | End: 2019-12-07 | Stop reason: HOSPADM

## 2019-12-06 RX ORDER — LOSARTAN POTASSIUM 25 MG/1
100 TABLET ORAL DAILY
Status: DISCONTINUED | OUTPATIENT
Start: 2019-12-06 | End: 2019-12-07 | Stop reason: HOSPADM

## 2019-12-06 RX ORDER — PROPOFOL 10 MG/ML
VIAL (ML) INTRAVENOUS CONTINUOUS PRN
Status: DISCONTINUED | OUTPATIENT
Start: 2019-12-06 | End: 2019-12-06

## 2019-12-06 RX ORDER — FAMOTIDINE 20 MG/1
20 TABLET, FILM COATED ORAL 2 TIMES DAILY
Status: DISCONTINUED | OUTPATIENT
Start: 2019-12-06 | End: 2019-12-07 | Stop reason: HOSPADM

## 2019-12-06 RX ORDER — BENAZEPRIL HYDROCHLORIDE 10 MG/1
20 TABLET ORAL DAILY
Status: DISCONTINUED | OUTPATIENT
Start: 2019-12-06 | End: 2019-12-07 | Stop reason: HOSPADM

## 2019-12-06 RX ORDER — IBUPROFEN 200 MG
16 TABLET ORAL
Status: DISCONTINUED | OUTPATIENT
Start: 2019-12-06 | End: 2019-12-07 | Stop reason: HOSPADM

## 2019-12-06 RX ORDER — CEFAZOLIN SODIUM 2 G/50ML
2 SOLUTION INTRAVENOUS
Status: COMPLETED | OUTPATIENT
Start: 2019-12-06 | End: 2019-12-07

## 2019-12-06 RX ORDER — BACITRACIN 50000 [IU]/1
INJECTION, POWDER, FOR SOLUTION INTRAMUSCULAR
Status: DISCONTINUED | OUTPATIENT
Start: 2019-12-06 | End: 2019-12-06 | Stop reason: HOSPADM

## 2019-12-06 RX ORDER — GLUCAGON 1 MG
1 KIT INJECTION
Status: DISCONTINUED | OUTPATIENT
Start: 2019-12-06 | End: 2019-12-07 | Stop reason: HOSPADM

## 2019-12-06 RX ORDER — DOCUSATE SODIUM 100 MG/1
100 CAPSULE, LIQUID FILLED ORAL EVERY 12 HOURS
Status: DISCONTINUED | OUTPATIENT
Start: 2019-12-06 | End: 2019-12-07 | Stop reason: HOSPADM

## 2019-12-06 RX ADMIN — FENTANYL CITRATE 50 MCG: 50 INJECTION INTRAMUSCULAR; INTRAVENOUS at 07:12

## 2019-12-06 RX ADMIN — HYDROMORPHONE HYDROCHLORIDE 0.2 MG: 2 INJECTION, SOLUTION INTRAMUSCULAR; INTRAVENOUS; SUBCUTANEOUS at 01:12

## 2019-12-06 RX ADMIN — PROPOFOL 75 MCG/KG/MIN: 10 INJECTION, EMULSION INTRAVENOUS at 07:12

## 2019-12-06 RX ADMIN — CEFAZOLIN SODIUM 2 G: 2 SOLUTION INTRAVENOUS at 07:12

## 2019-12-06 RX ADMIN — PHENYLEPHRINE HYDROCHLORIDE 100 MCG: 10 INJECTION INTRAVENOUS at 07:12

## 2019-12-06 RX ADMIN — SODIUM CHLORIDE, SODIUM LACTATE, POTASSIUM CHLORIDE, AND CALCIUM CHLORIDE: .6; .31; .03; .02 INJECTION, SOLUTION INTRAVENOUS at 04:12

## 2019-12-06 RX ADMIN — DEXTROSE 2 G: 50 INJECTION, SOLUTION INTRAVENOUS at 05:12

## 2019-12-06 RX ADMIN — INSULIN ASPART 2 UNITS: 100 INJECTION, SOLUTION INTRAVENOUS; SUBCUTANEOUS at 05:12

## 2019-12-06 RX ADMIN — CHLORTHALIDONE 25 MG: 25 TABLET ORAL at 04:12

## 2019-12-06 RX ADMIN — TRANEXAMIC ACID 1000 MG: 100 INJECTION, SOLUTION INTRAVENOUS at 07:12

## 2019-12-06 RX ADMIN — FENTANYL CITRATE 25 MCG: 50 INJECTION INTRAMUSCULAR; INTRAVENOUS at 07:12

## 2019-12-06 RX ADMIN — SODIUM CHLORIDE, SODIUM LACTATE, POTASSIUM CHLORIDE, AND CALCIUM CHLORIDE: .6; .31; .03; .02 INJECTION, SOLUTION INTRAVENOUS at 08:12

## 2019-12-06 RX ADMIN — FAMOTIDINE 20 MG: 20 TABLET ORAL at 09:12

## 2019-12-06 RX ADMIN — PHENYLEPHRINE HYDROCHLORIDE 50 MCG: 10 INJECTION INTRAVENOUS at 08:12

## 2019-12-06 RX ADMIN — MUPIROCIN 1 G: 20 OINTMENT TOPICAL at 09:12

## 2019-12-06 RX ADMIN — ROSUVASTATIN CALCIUM 5 MG: 5 TABLET, COATED ORAL at 03:12

## 2019-12-06 RX ADMIN — INSULIN ASPART 1 UNITS: 100 INJECTION, SOLUTION INTRAVENOUS; SUBCUTANEOUS at 09:12

## 2019-12-06 RX ADMIN — PHENYLEPHRINE HYDROCHLORIDE 100 MCG: 10 INJECTION INTRAVENOUS at 09:12

## 2019-12-06 RX ADMIN — VANCOMYCIN HYDROCHLORIDE 1250 MG: 1.25 INJECTION, POWDER, LYOPHILIZED, FOR SOLUTION INTRAVENOUS at 07:12

## 2019-12-06 RX ADMIN — ACETAMINOPHEN 1000 MG: 10 INJECTION, SOLUTION INTRAVENOUS at 03:12

## 2019-12-06 RX ADMIN — OXYCODONE HYDROCHLORIDE 5 MG: 5 TABLET ORAL at 04:12

## 2019-12-06 RX ADMIN — DEXTROSE 2 G: 50 INJECTION, SOLUTION INTRAVENOUS at 11:12

## 2019-12-06 RX ADMIN — POLYETHYLENE GLYCOL 3350 17 G: 17 POWDER, FOR SOLUTION ORAL at 04:12

## 2019-12-06 RX ADMIN — BENAZEPRIL HYDROCHLORIDE 20 MG: 10 TABLET ORAL at 03:12

## 2019-12-06 RX ADMIN — SODIUM CHLORIDE, SODIUM LACTATE, POTASSIUM CHLORIDE, AND CALCIUM CHLORIDE: .6; .31; .03; .02 INJECTION, SOLUTION INTRAVENOUS at 07:12

## 2019-12-06 RX ADMIN — DOCUSATE SODIUM 100 MG: 100 CAPSULE, LIQUID FILLED ORAL at 09:12

## 2019-12-06 RX ADMIN — LATANOPROST 1 DROP: 50 SOLUTION OPHTHALMIC at 09:12

## 2019-12-06 RX ADMIN — LIDOCAINE HYDROCHLORIDE 50 MG: 20 INJECTION, SOLUTION INTRAVENOUS at 07:12

## 2019-12-06 RX ADMIN — AMLODIPINE BESYLATE 10 MG: 5 TABLET ORAL at 03:12

## 2019-12-06 RX ADMIN — TRANEXAMIC ACID 1000 MG: 100 INJECTION, SOLUTION INTRAVENOUS at 08:12

## 2019-12-06 RX ADMIN — PHENYLEPHRINE HYDROCHLORIDE 50 MCG: 10 INJECTION INTRAVENOUS at 07:12

## 2019-12-06 RX ADMIN — PHENYLEPHRINE HYDROCHLORIDE 200 MCG: 10 INJECTION INTRAVENOUS at 09:12

## 2019-12-06 RX ADMIN — LOSARTAN POTASSIUM 100 MG: 25 TABLET ORAL at 03:12

## 2019-12-06 RX ADMIN — PROPOFOL 50 MG: 10 INJECTION, EMULSION INTRAVENOUS at 07:12

## 2019-12-06 RX ADMIN — ACETAMINOPHEN 1000 MG: 500 TABLET, FILM COATED ORAL at 09:12

## 2019-12-06 RX ADMIN — PHENYLEPHRINE HYDROCHLORIDE 200 MCG: 10 INJECTION INTRAVENOUS at 07:12

## 2019-12-06 RX ADMIN — ONDANSETRON HYDROCHLORIDE 4 MG: 2 SOLUTION INTRAMUSCULAR; INTRAVENOUS at 05:12

## 2019-12-06 RX ADMIN — BUPIVACAINE HYDROCHLORIDE 3 ML: 5 INJECTION, SOLUTION EPIDURAL; INTRACAUDAL; PERINEURAL at 07:12

## 2019-12-06 NOTE — PLAN OF CARE
Problem: Physical Therapy Goal  Goal: Physical Therapy Goal  Description  Goals to be met by: 19     Patient will increase functional independence with mobility by performin. Supine to sit with Modified [place]  2. Rolling to Left and Right with Modified [place]  3. Sit to stand transfer with Modified [place] using RW  4. Bed to chair transfer with Modified [place] using Rolling Walker  5. Gait >150 feet with Modified [place] using Rolling Walker   6. Lower extremity exercise program 2 sets x10 reps per handout, with independence  7. R knee ROM 0-90*     Outcome: Ongoing, Progressing    Pt ambulated ~50 ft with min A-CGA using RW.

## 2019-12-06 NOTE — BRIEF OP NOTE
Operative Note       Surgery Date: 12/6/2019     Surgeon(s) and Role:     * Douglas Corbin MD - Primary    Pre-op Diagnosis:  Osteoarthritis of right knee, unspecified osteoarthritis type [M17.11]    Post-op Diagnosis:  S/p TKA    Procedure(s) (LRB):  ARTHROPLASTY, KNEE, TOTAL ESTUARDO (Right)    Anesthesia: Spinal    Findings/Key Components:  C/w pre op dx    Core Measure Documentation:  Were antibiotics extended? No  Was the patient administered a VTE Prophylaxis? No. Short procedure; low risk  Estimated Blood Loss: < 50 m,l           Specimens (From admission, onward)    None        Implants:   Implant Name Type Inv. Item Serial No.  Lot No. LRB No. Used   CEMENT BONE SMPLX HV GENTMYCN - [id number]  CEMENT BONE SMPLX HV GENTMYCN  [employer]. [id number] Right 2   BASEPLATE SZ 3 TRI TS IMPLANT - [id number]  BASEPLATE SZ 3 TRI TS IMPLANT  [employer]. [id number] Right 1   COMP FEM POST STAB LUCIANA SZ 4 RT - [id number]  COMP FEM POST STAB LUCIANA SZ 4 RT  [employer]. [id number] Right 1   PATELLA TRI 29X8 X3 POLYETHYLE - [id number]  PATELLA TRI 29X8 X3 POLYETHYLE  [employer]. [id number] Right 1   TIBIAL POST STAB SZ 3 11X3 EDY - [id number]  TIBIAL POST STAB SZ 3 11X3 EDY  [employer]. [id number] Right 1       Complications: none           Disposition: PACU - hemodynamically stable.           Condition: Stable    Attestation:  I was present for the entire procedure.

## 2019-12-06 NOTE — TRANSFER OF CARE
"Anesthesia Transfer of Care Note    Patient: Radha Morales    Procedure(s) Performed: Procedure(s) (LRB):  ARTHROPLASTY, KNEE, TOTAL ESTUARDO (Right)    Patient location: PACU    Anesthesia Type: MAC    Transport from OR: Transported from OR on room air with adequate spontaneous ventilation    Post pain: adequate analgesia    Post assessment: no apparent anesthetic complications    Post vital signs: stable    Level of consciousness: awake and alert    Nausea/Vomiting: no nausea/vomiting    Complications: none    Transfer of care protocol was followed      Last vitals:   Visit Vitals  /64   Pulse 63   Temp 36.7 °C (98 °F) (Oral)   Resp 18   Ht 5' 5" (1.651 m)   Wt 79 kg (174 lb 2.6 oz)   SpO2 99%   Breastfeeding? No   BMI 28.98 kg/m²     "

## 2019-12-06 NOTE — PLAN OF CARE
Pt states that her pain is tolerable and denies nausea at this time, report called and pt transported to  403

## 2019-12-06 NOTE — PLAN OF CARE
Problem: Occupational Therapy Goal  Goal: Occupational Therapy Goal  Description  Goals to be met by: 12/13/19    Patient will increase functional independence with ADLs by performing:    UE Dressing with Stand-by Assistance.  LE Dressing with Stand-by Assistance.  Grooming while standing at sink with Stand-by Assistance.  Toileting from toilet with Stand-by Assistance for hygiene and clothing management.   Supine to sit with Stand-by Assistance.  Step transfer with Stand-by Assistance  Toilet transfer to toilet with Stand-by Assistance.     Outcome: Ongoing, Progressing   OT eval is complete. The patient performed bed mobility with min assist and amb using a RW with CGA.  The patient will benefit from  OT. The patient may need a RW for home use.

## 2019-12-06 NOTE — PT/OT/SLP EVAL
Physical Therapy Evaluation    Patient Name:  Radha Morales   MRN:  [id number]    Recommendations:     Discharge Recommendations:  home health PT(with family assistance)   Discharge Equipment Recommendations: none   Barriers to discharge: None    Assessment:     Radha Morales is a 79 y.o. female admitted with a medical diagnosis of Osteoarthritis of right knee.  She presents with the following impairments/functional limitations:  weakness, impaired functional mobilty, gait instability, impaired balance, decreased lower extremity function, decreased safety awareness, pain, decreased ROM, impaired skin, edema, orthopedic precautions.    Rehab Prognosis: Good; patient would benefit from acute skilled PT services to address these deficits and reach maximum level of function.    Recent Surgery: Procedure(s) (LRB):  ARTHROPLASTY, KNEE, TOTAL ESTUARDO (Right) Day of Surgery    Plan:     During this hospitalization, patient to be seen BID to address the identified rehab impairments via gait training, therapeutic activities, therapeutic exercises and progress toward the following goals:    · Plan of Care Expires:  12/20/19    Subjective     Chief Complaint: Pt having difficulty burping.  Patient/Family Comments/goals: Pt agreeable to therapy.   Pain/Comfort:  · Pain Rating 1: 6/10  · Location - Side 1: Right  · Location 1: knee  · Pain Addressed 1: Pre-medicate for activity    Living Environment:  Pt lives alone in a 2 story house with no concerns at entry.  Pt's bedroom is on the 1st floor.   Prior to admission, patients level of function was mod I with ambulation using RW.  Pt can drive, however does not have a working vehicle at this time.  Equipment used at home: walker, rolling, bedside commode, shower chair.  Upon discharge, patient will have assistance from children.    Objective:     Communicated with nurse David prior to session.  Patient found HOB elevated with flowers catheter, SCD, FCD, cryotherapy, peripheral IV,  telemetry, bed alarm  upon PT entry to room.    General Precautions: Standard, fall, hearing impaired, diabetic   Orthopedic Precautions:RLE weight bearing as tolerated   Braces: N/A     Exams:  · Cognitive Exam:  Patient was able to follow 2 step commands, limited by [place].    · Gross Motor Coordination:  WFL  · Postural Exam:  Patient presented with the following abnormalities:    · -       Rounded shoulders  · Sensation:    · -       Intact  light/touch BLE  · Skin Integrity/Edema:      · -       Skin integrity: Visible skin intact and R knee dressings intact  · -       Edema: Mild RLE  · BLE ROM: WFL except R knee ROM 0-80*  · BLE Strength: WFL    Functional Mobility:  · Bed Mobility:     · Scooting: contact guard assistance  · Supine to Sit: contact guard assistance and minimum assistance with RLE and HOB elevated  · Sit to Supine: contact guard assistance and minimum assistance with RLE   · Transfers:     · Sit to Stand:  contact guard assistance with rolling walker  · Gait: Pt ambulated ~50 ft with min A-CGA using RW.  Pt with slight increased B hip ER, decreased weight shifting, decreased step length, and decreased zita.  Pt required min A/VC's for safety with RW management.   · Balance: Pt with fair dynamic standing balance.       Therapeutic Activities and Exercises:  BLE supine therex 15 reps: AP, QS, GS, HS    · Pt educated on PT role/POC.   · Importance of OOB activity with staff assistance.  · Importance of sitting up in the chair throughout the day as tolerated, especially for meals   · Safety during functional t/f and mobility with use of RW  · All questions/concerns answered within PT scope of practice  · Pt verbalized good understanding.      AM-PAC 6 CLICK MOBILITY  Total Score:19     Patient left HOB elevated with all lines intact, call button in reach, bed alarm on and PCT present.  Polar ice refilled/placed along with FCD/SCD.    GOALS:   Multidisciplinary Problems     Physical Therapy Goals         Problem: Physical Therapy Goal    Goal Priority Disciplines Outcome Goal Variances Interventions   Physical Therapy Goal     PT, PT/OT Ongoing, Progressing     Description:  Goals to be met by: 19     Patient will increase functional independence with mobility by performin. Supine to sit with Modified [place]  2. Rolling to Left and Right with Modified [place]  3. Sit to stand transfer with Modified [place] using RW  4. Bed to chair transfer with Modified [place] using Rolling Walker  5. Gait >150 feet with Modified [place] using Rolling Walker   6. Lower extremity exercise program 2 sets x10 reps per handout, with independence  7. R knee ROM 0-90*                      History:     Past Medical History:   Diagnosis Date    Arthritis     Diabetes mellitus     Hypertension        Past Surgical History:   Procedure Laterality Date    CATARACT EXTRACTION Bilateral     CERVIX REMOVAL N/A 2019    Procedure: Removal of cervix with culdoplasty ;  Surgeon: Sundar Alfonso MD;  Location: [facility] OR;  Service: OB/GYN;  Laterality: N/A;    COLONOSCOPY N/A 3/31/2017    Procedure: COLONOSCOPY;  Surgeon: Apolinar Matute MD;  Location: [facility] ENDO;  Service: Endoscopy;  Laterality: N/A;    HYSTERECTOMY  1995    OVARY SURGERY      TOTAL VAGINAL HYSTERECTOMY  2019    Procedure: HYSTERECTOMY, TOTAL, VAGINAL;  Surgeon: Sundar Alfonso MD;  Location: [facility] OR;  Service: OB/GYN;;       Time Tracking:     PT Received On: 19  PT Start Time: 1616     PT Stop Time: 1641  PT Total Time (min): 25 min     Billable Minutes: Evaluation 15 min co-tx with OT and Therapeutic Exercise 10 min      Laurel U Abebe, PT  2019

## 2019-12-06 NOTE — ANESTHESIA PREPROCEDURE EVALUATION
12/06/2019    Pre-operative evaluation for Procedure(s) (LRB):  ARTHROPLASTY, KNEE, TOTAL ESTUARDO (Right)    Radha Morales is a 79 y.o. female     Patient Active Problem List   Diagnosis    Chest wall pain    Essential hypertension    Type 2 diabetes mellitus, controlled    Ischemic colitis    Leukocytosis    Malnutrition of moderate degree    Debility    Medication side effect    Uterine prolapse    Menopause    Status post vaginal hysterectomy    Osteoarthritis of right knee       Review of patient's allergies indicates:  No Known Allergies    No current facility-administered medications on file prior to encounter.      Current Outpatient Medications on File Prior to Encounter   Medication Sig Dispense Refill    chlorthalidone (HYGROTEN) 25 MG Tab Take 25 mg by mouth once daily.      latanoprost 0.005 % ophthalmic solution INSTILL 1 DROP INTO BOTH EYES QHS  3    losartan (COZAAR) 100 MG tablet TK 1 T PO D  0    metformin (GLUCOPHAGE) 1000 MG tablet Take 1,000 mg by mouth 2 (two) times daily with meals.      rosuvastatin (CRESTOR) 5 MG tablet   3    ammonium lactate 12 % Crea   10    aspirin (ECOTRIN) 81 MG EC tablet Take 81 mg by mouth once daily.      ibuprofen (ADVIL,MOTRIN) 600 MG tablet Take 1 tablet (600 mg total) by mouth every 6 (six) hours as needed for Pain. 30 tablet 1    meloxicam (MOBIC) 7.5 MG tablet Take 7.5 mg by mouth once daily.      multivit,iron,minerals/lutein (CENTRUM SILVER ULTRA WOMEN'S ORAL) Take by mouth.         VITALS  Vitals:    12/06/19 0557   BP: (!) 166/73   Pulse: 78   Resp: 18   Temp: 36.7 °C (98 °F)         2D Echo:  Results for orders placed or performed during the hospital encounter of 08/18/17   2D echo with color flow doppler   Result Value Ref Range    QEF 55 55 - 65    Diastolic Dysfunction No     Est. PA Systolic Pressure 30.67     Pericardial  Effusion NONE     Tricuspid Valve Regurgitation TRIVIAL TO MILD          Anesthesia Evaluation    I have reviewed the Patient Summary Reports.    I have reviewed the Nursing Notes.   I have reviewed the Medications.     Review of Systems  Anesthesia Hx:  No problems with previous Anesthesia  History of prior surgery of interest to airway management or planning: Previous anesthesia: MAC  cataract with MAC.  Denies Family Hx of Anesthesia complications.   Denies Personal Hx of Anesthesia complications.   Social:  Non-Smoker, No Alcohol Use    Hematology/Oncology:  Hematology Normal   Oncology Normal     EENT/Dental:EENT/Dental Normal   Cardiovascular:   Exercise tolerance: poor Hypertension    Pulmonary:  Pulmonary Normal    Renal/:  Renal/ Normal     Hepatic/GI:  Hepatic/GI Normal    Musculoskeletal:   Arthritis     Neurological:  Neurology Normal    Endocrine:   Diabetes, well controlled, type 2    Dermatological:  Skin Normal    Psych:  Psychiatric Normal           Physical Exam  General:  Well nourished    Airway/Jaw/Neck:  Airway Findings: Mouth Opening: Normal Tongue: Normal  General Airway Assessment: Adult  Mallampati: II  TM Distance: Normal, at least 6 cm         Dental:  Dental Findings: (Only two bottom teeth present; not loose) Lower partial dentures, Upper Dentures   Chest/Lungs:  Chest/Lungs Findings: Normal Respiratory Rate     Heart/Vascular:  Heart Findings: Rate: Normal  Rhythm: Regular Rhythm  Heart murmur: negative       Mental Status:  Mental Status Findings:  Cooperative, Alert and Oriented         Anesthesia Plan  Type of Anesthesia, risks & benefits discussed:  Anesthesia Type:  general  Patient's Preference:   Intra-op Monitoring Plan: standard ASA monitors  Intra-op Monitoring Plan Comments:   Post Op Pain Control Plan: multimodal analgesia  Post Op Pain Control Plan Comments:   Induction:   IV  Beta Blocker:         Informed Consent: Patient understands risks and agrees with Anesthesia  plan.  Questions answered. Anesthesia consent signed with patient.  ASA Score: 2     Day of Surgery Review of History & Physical:  There are no significant changes.          Ready For Surgery From Anesthesia Perspective.

## 2019-12-06 NOTE — PLAN OF CARE
[facility]    [facility] ORDERS  FACE TO FACE ENCOUNTER    Patient Name: Radha Morales  YOB: 1940    PCP: Adriana Figueroa MD   PCP Address: [address] [place] [id number]  PCP Phone Number: [phone]  PCP Fax: [phone]    Encounter Date: 12/06/2019    Admit to Home Health    Diagnoses:  Active Hospital Problems    Diagnosis  POA    *Osteoarthritis of right knee [M17.11]  Yes      Resolved Hospital Problems   No resolved problems to display.       No future appointments.        I have seen and examined this patient face to face today. My clinical findings that support the need for the home health skilled services and home bound status are the following:  Weakness/numbness causing balance and gait disturbance due to Joint Replacement making it taxing to leave home.    Allergies:Review of patient's allergies indicates:  No Known Allergies    Diet: diabetic diet: 2000 calorie    Activities: WBAT with walker     Nursing:   SN to complete comprehensive assessment including routine vital signs. Instruct on disease process and s/s of complications to report to MD. Follow specific home health arthoplasty protocol. Review/verify medication list sent home with the patient at time of discharge  and instruct patient/caregiver as needed. If coumadin ordered, coumadin clinic to manage INR with INR draws 2x per week with a goal to maintain INR between 1.8 and 2.2. Frequency may be adjusted depending on start of care date.    Notify MD if SBP > 160 or < 90; DBP > 90 or < 50; HR > 120 or < 50; Temp > 101;     Home Medical Equipment:  Walker, 3-1 bedside commode, transfer tub bench    CONSULTS:    Physical Therapy to evaluate and treat. Evaluate for home safety and equipment needs; Establish/upgrade home exercise program. Perform / instruct on therapeutic exercises, gait training, transfer training, and Range of Motion.    OTHER:  Occupational Therapy to evaluate and  treat. Evaluate home environment for safety and equipment needs. Perform/Instruct on transfers, ADL training, ROM, and therapeutic exercises.    MISCELLANEOUS CARE:  Routine Skin for Bedridden Patients: Instruct patient/caregiver to apply moisture barrier cream to all skin folds and wet areas in perineal area daily and after baths and all bowel movements.    WOUND CARE ORDERS  Follow Home Health specific protocol.      Medications: Review discharge medications with patient and family and provide education.      Current Discharge Medication List      START taking these medications    Details   cefadroxil (DURICEF) 500 MG Cap Take 1 capsule (500 mg total) by mouth every 12 (twelve) hours.  Qty: 14 capsule, Refills: 0      docusate sodium (COLACE) 100 MG capsule Take 1 capsule (100 mg total) by mouth 2 (two) times daily as needed for Constipation.  Qty: 21 capsule, Refills: 1      oxyCODONE-acetaminophen (PERCOCET) 5-325 mg per tablet Take 1 tablet by mouth every 4 (four) hours as needed for Pain.  Qty: 41 tablet, Refills: 0         CONTINUE these medications which have CHANGED    Details   !! aspirin (ECOTRIN) 81 MG EC tablet Take 1 tablet (81 mg total) by mouth 2 (two) times daily with meals.  Qty: 58 tablet, Refills: 0      !! aspirin (ECOTRIN) 81 MG EC tablet Take 1 tablet (81 mg total) by mouth 2 (two) times daily with meals.  Qty: 58 tablet, Refills: 0       !! - Potential duplicate medications found. Please discuss with provider.      CONTINUE these medications which have NOT CHANGED    Details   amlodipine-benazepril 10-20mg (LOTREL) 10-20 mg per capsule Take 1 capsule by mouth once daily.      chlorthalidone (HYGROTEN) 25 MG Tab Take 25 mg by mouth once daily.      estradiol (ESTRACE) 0.01 % (0.1 mg/gram) vaginal cream PLACE 1 GRAM VAGINALLY EVERY DAY  Qty: 42.5 g, Refills: 2    Associated Diagnoses: Pessary maintenance; Menopause      latanoprost 0.005 % ophthalmic solution INSTILL 1 DROP INTO BOTH EYES  QHS  Refills: 3      losartan (COZAAR) 100 MG tablet TK 1 T PO D  Refills: 0      metformin (GLUCOPHAGE) 1000 MG tablet Take 1,000 mg by mouth 2 (two) times daily with meals.      rosuvastatin (CRESTOR) 5 MG tablet Refills: 3      ammonium lactate 12 % Crea Refills: 10      ibuprofen (ADVIL,MOTRIN) 600 MG tablet Take 1 tablet (600 mg total) by mouth every 6 (six) hours as needed for Pain.  Qty: 30 tablet, Refills: 1      meloxicam (MOBIC) 7.5 MG tablet Take 7.5 mg by mouth once daily.      multivit,iron,minerals/lutein (CENTRUM SILVER ULTRA WOMEN'S ORAL) Take by mouth.             I certify that this patient is confined to her home and needs physical therapy.

## 2019-12-07 VITALS
HEART RATE: 87 BPM | RESPIRATION RATE: 18 BRPM | DIASTOLIC BLOOD PRESSURE: 62 MMHG | OXYGEN SATURATION: 99 % | TEMPERATURE: 99 F | WEIGHT: 172.38 LBS | HEIGHT: 65 IN | BODY MASS INDEX: 28.72 KG/M2 | SYSTOLIC BLOOD PRESSURE: 137 MMHG

## 2019-12-07 LAB — POCT GLUCOSE: 166 MG/DL (ref 70–110)

## 2019-12-07 PROCEDURE — 97110 THERAPEUTIC EXERCISES: CPT

## 2019-12-07 PROCEDURE — 97116 GAIT TRAINING THERAPY: CPT

## 2019-12-07 PROCEDURE — 25000003 PHARM REV CODE 250: Performed by: ORTHOPAEDIC SURGERY

## 2019-12-07 PROCEDURE — 97535 SELF CARE MNGMENT TRAINING: CPT

## 2019-12-07 RX ADMIN — BENAZEPRIL HYDROCHLORIDE 20 MG: 10 TABLET ORAL at 09:12

## 2019-12-07 RX ADMIN — CHLORTHALIDONE 25 MG: 25 TABLET ORAL at 09:12

## 2019-12-07 RX ADMIN — FAMOTIDINE 20 MG: 20 TABLET ORAL at 09:12

## 2019-12-07 RX ADMIN — DOCUSATE SODIUM 100 MG: 100 CAPSULE, LIQUID FILLED ORAL at 09:12

## 2019-12-07 RX ADMIN — MUPIROCIN 1 G: 20 OINTMENT TOPICAL at 09:12

## 2019-12-07 RX ADMIN — OXYCODONE HYDROCHLORIDE 10 MG: 5 TABLET ORAL at 06:12

## 2019-12-07 RX ADMIN — LOSARTAN POTASSIUM 100 MG: 25 TABLET ORAL at 09:12

## 2019-12-07 RX ADMIN — ROSUVASTATIN CALCIUM 5 MG: 5 TABLET, COATED ORAL at 09:12

## 2019-12-07 RX ADMIN — POLYETHYLENE GLYCOL 3350 17 G: 17 POWDER, FOR SOLUTION ORAL at 09:12

## 2019-12-07 RX ADMIN — AMLODIPINE BESYLATE 10 MG: 5 TABLET ORAL at 09:12

## 2019-12-07 RX ADMIN — OXYCODONE HYDROCHLORIDE 10 MG: 5 TABLET ORAL at 12:12

## 2019-12-07 NOTE — PLAN OF CARE
12/07/19 1445   Discharge Assessment   Assessment Type   (Pt discharged prior to completing assessment. )

## 2019-12-07 NOTE — PT/OT/SLP PROGRESS
Occupational Therapy   Treatment    Name: Radha Morales  MRN: [id number]  Admitting Diagnosis:  Osteoarthritis of right knee  1 Day Post-Op    Recommendations:     Discharge Recommendations: home health OT(with family assist)  Discharge Equipment Recommendations:  none  Barriers to discharge:  None    Assessment:     Radha Morales is a 79 y.o. female with a medical diagnosis of Osteoarthritis of right knee.  She presents with improved functional mobility   with CGA needed for management of the RLE during bed mobility and CGA for BSC transfer. The patient states she has DME and assistance as needed for home and is ready for  D/C. The patient will benefit from continues OT to address functional deficits and safety.      Performance deficits affecting function are decreased lower extremity function, impaired self care skills, impaired functional mobilty, gait instability, orthopedic precautions, edema, impaired joint extensibility, impaired skin, decreased safety awareness.     Rehab Prognosis:  Good; patient would benefit from acute skilled OT services to address these deficits and reach maximum level of function.       Plan:     Patient to be seen daily to address the above listed problems via self-care/home management, therapeutic activities, therapeutic exercises  · Plan of Care Expires: 12/08/19  · Plan of Care Reviewed with: patient    Subjective     Pain/Comfort:  · Pain Rating 1: (minimal c/o right knee pain)  · Location - Side 1: Right  · Location 1: knee  · Pain Addressed 1: Reposition, Cessation of Activity    Objective:     Communicated with: nurse prior to session.  Patient found HOB elevated with bed alarm, peripheral IV, cryotherapy upon OT entry to room.    General Precautions: Standard, fall, diabetic, hearing impaired   Orthopedic Precautions:RLE weight bearing as tolerated   Braces: N/A     Occupational Performance:     Bed Mobility:    · Patient completed Scooting/Bridging with stand by  assistance  · Patient completed Supine to Sit with contact guard assistance and with leg lift     Functional Mobility/Transfers:  · Patient completed Sit <> Stand Transfer with contact guard assistance  with  rolling walker   · Patient completed Toilet Transfer Step Transfer technique with stand by assistance and contact guard assistance with  rolling walker and drop arm commode  · Functional Mobility: The patient transferred from the bed<>BSC and stepped to the chair using a RW with SBA.    Activities of Daily Living:  · Upper Body Dressing: modified independence to don marion shirt  · Lower Body Dressing: contact guard assistance and minimum assistance to don pajama paants  · Toileting: stand by assistance to wipe while seated on the BSC      AMPAC 6 Click ADL: 21    Treatment & Education:  The patient participated in BSC transfer and dressing while seated on the EOB. The pattient was educated re: handout and verbal cues r: LB dressing S/P TKA. The pattient was given a handout for LB dressing s/p TKA and home/bathroom safety.    Patient left up in chair with all lines intact, call button in reach and nurse, [facility] notifiedEducation:      GOALS:   Multidisciplinary Problems     Occupational Therapy Goals        Problem: Occupational Therapy Goal    Goal Priority Disciplines Outcome Interventions   Occupational Therapy Goal     OT, PT/OT Ongoing, Progressing    Description:  Goals to be met by: 12/13/19    Patient will increase functional independence with ADLs by performing:    UE Dressing with Stand-by Assistance.  LE Dressing with Stand-by Assistance.  Grooming while standing at sink with Stand-by Assistance.  Toileting from toilet with Stand-by Assistance for hygiene and clothing management.   Supine to sit with Stand-by Assistance.  Step transfer with Stand-by Assistance  Toilet transfer to toilet with Stand-by Assistance.                      Time Tracking:     OT Date of Treatment: 12/07/19  OT Start Time:  0903  OT Stop Time: 0930  OT Total Time (min): 27 min    Billable Minutes:Self Care/Home Management 27    Gretchen Taylor OT  12/7/2019

## 2019-12-07 NOTE — PLAN OF CARE
Patient tolerated PT session well. She ambulated 80ft with RW and SBA. She performed R LE therex x15 reps with handout provided for therex at home. She is ready to discharge home from PT standpoint. RN notified.     Problem: Physical Therapy Goal  Goal: Physical Therapy Goal  Description  Goals to be met by: 19     Patient will increase functional independence with mobility by performin. Supine to sit with Modified Lane  2. Rolling to Left and Right with Modified Lane  3. Sit to stand transfer with Modified Lane using RW  4. Bed to chair transfer with Modified Lane using Rolling Walker  5. Gait >150 feet with Modified Lane using Rolling Walker   6. Lower extremity exercise program 2 sets x10 reps per handout, with independence  7. R knee ROM 0-90*     Outcome: Met

## 2019-12-07 NOTE — DISCHARGE SUMMARY
Orthopaedic Discharge Summary  Radha Morales, 1940   [id number], [id number]      SUMMARY     Admit Date: 12/6/2019    Attending Physician: see cosigner    Discharge Date: 12/7/2019 12:29 PM ;    Principal Diagnosis: total kneearthroplasty  Secondary Diagnosis:     1. Primary osteoarthritis of right knee    2. Osteoarthritis of right knee, unspecified osteoarthritis type             History of Present Illness: Radha Morales is a 79 y.o. female with a history significant for degenerative joint disease that has been refractory to all conservative/non-operative modalities.  An extensive discussion was had concerning the risks, benefits, alternatives, and indications for surgical intervention.  All questions were answered, and informed consent was obtained. No guarantees were made. The patient elected to proceed with surgery.      Hospital Course: On the aforementioned date, the patient underwent  A total knee arthroplsty. See the operative note for details. Post-operatively the patient was transferred to the recovery room and then to the floor.    The interim of the hospital stay from arrival on the floor up to discharge has been largely uncomplicated. The patient progressed well with physical therapy on a daily basis. See the PT notes for details. Post operative pain was controlled and she was tolerating her diet and voiding spontaneously.     Currently the patient is ambulating with moderate assistance and the physical therapy team feels that the patient's progress is sufficient to allow the patient to be discharged home safely.     Disposition: Stable. Discharged to home with home health.    Activity: As tolerated with assistive devices PRN.     Diet: Resume a healthy, balanced diet    Follow-up: Patient will follow-up with the attending surgeon in the [facility] within two weeks of the operative date for wound check and re-evaluation of the post-operative plan.     Medications: See below  for detailed medication reconciliation. This includes asa for DVT prophylaxisfor 30 days following joint replacement surgery.    The patient was instructed to monitor for signs and symptoms of infection and to contact the orthopaedic surgery clinic with any questions or concerns.      Patient Instructions:   Discharge Medication List as of 12/7/2019 11:25 AM      START taking these medications    Details   cefadroxil (DURICEF) 500 MG Cap Take 1 capsule (500 mg total) by mouth every 12 (twelve) hours., Starting Fri 12/6/2019, Print      docusate sodium (COLACE) 100 MG capsule Take 1 capsule (100 mg total) by mouth 2 (two) times daily as needed for Constipation., Starting Fri 12/6/2019, Until Mon 12/16/2019, Print      oxyCODONE-acetaminophen (PERCOCET) 5-325 mg per tablet Take 1 tablet by mouth every 4 (four) hours as needed for Pain., Starting Fri 12/6/2019, Print         CONTINUE these medications which have CHANGED    Details   aspirin (ECOTRIN) 81 MG EC tablet Take 1 tablet (81 mg total) by mouth 2 (two) times daily with meals., Starting Fri 12/6/2019, Until Sat 12/5/2020, Print         CONTINUE these medications which have NOT CHANGED    Details   amlodipine-benazepril 10-20mg (LOTREL) 10-20 mg per capsule Take 1 capsule by mouth once daily., Historical Med      chlorthalidone (HYGROTEN) 25 MG Tab Take 25 mg by mouth once daily., Historical Med      estradiol (ESTRACE) 0.01 % (0.1 mg/gram) vaginal cream PLACE 1 GRAM VAGINALLY EVERY DAY, Normal      latanoprost 0.005 % ophthalmic solution INSTILL 1 DROP INTO BOTH EYES QHS, Historical Med      losartan (COZAAR) 100 MG tablet TK 1 T PO D, Historical Med      metformin (GLUCOPHAGE) 1000 MG tablet Take 1,000 mg by mouth 2 (two) times daily with meals., Until Discontinued, Historical Med      rosuvastatin (CRESTOR) 5 MG tablet Starting Wed 5/15/2019, Historical Med      ammonium lactate 12 % Crea Starting Mon 7/1/2019, Historical Med      multivit,iron,minerals/lutein  (CENTRUM SILVER ULTRA WOMEN'S ORAL) Take by mouth., Historical Med         STOP taking these medications       ibuprofen (ADVIL,MOTRIN) 600 MG tablet Comments:   Reason for Stopping:         meloxicam (MOBIC) 7.5 MG tablet Comments:   Reason for Stopping:               Discharge Procedure Orders (must include Diet, Follow-up, Activity)   Discharge Procedure Orders (must include Diet, Follow-up, Activity)   Diet diabetic     Ice to affected area     Notify your health care provider if you experience any of the following:  increased confusion or weakness     Notify your health care provider if you experience any of the following:  persistent dizziness, light-headedness, or visual disturbances     Notify your health care provider if you experience any of the following:  worsening rash     Notify your health care provider if you experience any of the following:  severe persistent headache     Notify your health care provider if you experience any of the following:  difficulty breathing or increased cough     Notify your health care provider if you experience any of the following:  redness, tenderness, or signs of infection (pain, swelling, redness, odor or green/yellow discharge around incision site)     Notify your health care provider if you experience any of the following:  severe uncontrolled pain     Notify your health care provider if you experience any of the following:  persistent nausea and vomiting or diarrhea     Notify your health care provider if you experience any of the following:  temperature >100.4      Remove dressing in 72 hours     Weight bearing restrictions (specify):   Order Comments: WBAT with tim Corbin M.D.

## 2019-12-07 NOTE — NURSING
Gave pt discharge instructions and prescriptions, signs and symptoms of infection and DVT, verbalized understanding. Waiting on ride, instructed to call when arrive. Verbalized understanding.

## 2019-12-07 NOTE — OP NOTE
DATE OF PROCEDURE: 12/06/2019    PREOPERATIVE DIAGNOSIS: Osteoarthritis right knee.     POSTOPERATIVE DIAGNOSIS: Osteoarthritis right knee.     PROCEDURE: 1) Right total knee replacement (CPT #55080)   2) Computer assisted surgical navigation (CPT #85506)    SURGEON: MARY GARCIA M.D.     ASSISTANT: Marcos GARCIA    ANESTHESIA: spinal     ESTIMATED BLOOD LOSS: < 50  mL.     Complications: None    Specimen: none    INDICATIONS: The patient is a 79 y.o. female with a longstanding history of right knee pain. Radiographs revealed advanced arthritis of the right knee.  She had failed extensive conservative treatment at this point. Treatment options were discussed and the patient elected to proceed with total knee replacement with computer navigation/robotic assistance. Risks and complications were discussed including, but not limited to the risks of anesthetic complications, infections, wound healing complications, DVT, pulmonary embolism, death, continued pain, stiffness and need for further surgery among others and she elected to proceed. Perioperative medical risks were also discussed.     COMPONENTS USED: Rex Triathlon size 4 right PS  femoral component, 3 Triathlon tibial component universal baseplate , 11 mm poly tibial insert, 29 mm patellar component.     DESCRIPTION OF PROCEDURE: The patient was taken to the Operating Room where general anesthesia was administered by the Anesthesia Department. She was then placed in the supine position. All superficial neurovascular structures were well padded. The right lower extremity was then sterilely prepped and draped in the normal fashion.     Under tourniquet control, a 20 cm longitudinal incision was made over the anterior aspect of the knee.  Subcutaneous tissue was sharply dissected down to the deep fascia, which was incised along the line of the incision.  A standard medial parapatellar arthrotomy was made.  The proximal medial tibial plateau was then  subperiosteally exposed protecting the medial collateral ligament.  A portion of the infrapatellar fat pad was excised.  The patella was everted and the knee was flexed to 90 degrees.    The patellar cutting guide was then used to remove the articular of the patellar surface to a final thickness of 13 mm.  This was sized to a size 29. Drill holes were placed.    The tibial and femoral check points were then placed in the standard fashion. The tracking arrays were then placed by placing 2 parallel Schanz pins in both the tibia and the femur. The tibial pins were placed anteromedially approximately 5 cm distal to the tibial tubercle. The femoral pins were placed directly anterior approximately 5 cm proximal to the superior pole of the patella.     After confirming the check points, the hip was rotated to assess the center of rotation of the hip.     The femoral and tibial surfaces were then mapped in the standard fashion for ESTUARDO total knee replacemen. Once surface mapping was complete, the knee was placed infull extension while applying gentle varus and valgus stress followed by flexion at 90 degrees using graduated spoons to assess soft tissue tightness and the flexion-extension tension gaps were equalized by adjusting the component positions.     Once this was completed, the onlinetours unit was placed and secured and check points on the robotic arm were confirmed. The femoral preparation using the saw was first performed in the standard fashion by cutting the distal, posterior champher, anterior, and anterior champher cuts and once the femoral preparation was complete, the tibial cut was performed protecting the patellar tendon and patellar tendon.  The ACL was cut and the PCL cut.  All bone fragments were removed and the medial and lateral menisci were removed.  The femoral box was cut with the cutting guide. Once completion of both femoral and tibial preparation was done, the tibial baseplate trial was placed,  impacting the keel into place.The femoral trial was then impacted as well. The 11 mm trial implant was then placed. The knee was placed through a range of motion and tension was checked throughout full range of motion was noted to be satisfactory.     At this time, all trial components were removed. The tibial, femoral, and patellar surfaces were thoroughly irrigated in a pulse lavage fashion and dried, and two batches of cement were mixed. The tibial component was impacted in position and held in place as any excess cement was removed using cement removal tools. The femoral and patellar components were then cemented in position, impacted, any excess cement was removed using the cement removal tools. The trial tibial liner was placed while the cement polymerized.  Any residual soft tissue impingement or soft tissue debris was removed and the final tibial polyethylene was then locked into position.     The wound was then thoroughly irrigated. The check points were removed along with the Schanz pins for the tibial and femoral arrays.    The capsule and parapatellar retinaculum were then closed with interrupted figure-of-eight sutures of #1 Vicryl and oversewn with stratafix, subcutaneous tissue was closed with interrupted inverted stitches of #2-0 Vicryl. Skin was approximated using skin staples. Sterile dressing was applied and the patient was returned to the Postanesthesia Care Unit in stable condition.    Post op check:  Palpable DP pulse   AFVSS  pain controlled  Xray reviewed

## 2019-12-07 NOTE — PLAN OF CARE
"TN reviewed follow up appointment information as well as  "Post op discharge instructions" handout with patient using teach back. Patient stated she will notify the doctor If she has a foul odor from her incision and redness and warmth to her legs. Patient is in agreement and verbalized an understanding. Placed discharge information in blue discharge folder. TN also reviewed patient responsibility checklist with her using teach back. Patient was able to verbalize her responsibilities after discharge to manage her care at home being   1. Going to follow up appointments   2. Picking up rx from the pharmacy when discharged  3. Taking her medications as prescribed     Patient informed that [facility] [employer] will contact her to arrange her home visits once accepted.    Patient stated she has all necessary dme at home.     Patient's nurse, Elvie , informed that patient can discharge from  standpoint.        12/07/19 1146   Final Note   Assessment Type Final Discharge Note   Anticipated Discharge Disposition Home-Health   What phone number can be called within the next 1-3 days to see how you are doing after discharge?   ([phone])   Hospital Follow Up  Appt(s) scheduled? Yes   Discharge plans and expectations educations in teach back method with documentation complete? Yes   Right Care Referral Info   Post Acute Recommendation Home-care     "

## 2019-12-07 NOTE — PT/OT/SLP EVAL
Occupational Therapy   Evaluation    Name: Radha Mroales  MRN: [id number]  Admitting Diagnosis:  Osteoarthritis of right knee 1 Day Post-Op    Recommendations:     Discharge Recommendations: home health OT(with family asssist)  Discharge Equipment Recommendations:  none  Barriers to discharge:  None    Assessment:     Radha Morales is a 79 y.o. female with a medical diagnosis of Osteoarthritis of right knee.  She presents with self care and functional mobility deficits R/T s/p TKA. Performance deficits affecting function: impaired self care skills, gait instability, impaired functional mobilty, impaired balance, decreased coordination, decreased lower extremity function, pain, decreased ROM, decreased safety awareness, impaired cardiopulmonary response to activity, impaired skin, edema, orthopedic precautions.      Rehab Prognosis: Good; patient would benefit from acute skilled OT services to address these deficits and reach maximum level of function.       Plan:     Patient to be seen daily to address the above listed problems via self-care/home management, therapeutic activities, therapeutic exercises  · Plan of Care Expires: 12/08/19  · Plan of Care Reviewed with: patient    Subjective     Chief Complaint: needs to belch  Patient/Family Comments/goals: return to (I) living    Occupational Profile:  Living Environment: Pt lives alone in a 2 story house with no concerns at entry.  Pt's bedroom is on the 1st floor  Previous level of function: Prior to admission, patients level of function was mod I with ambulation using RW.  Pt can drive, however does not have a working vehicle at this time.  Roles and Routines:The patient performs home management.  Equipment Used at Home:  walker, rolling, bedside commode, shower chair  Assistance upon Discharge: children    Pain/Comfort:  · Pain Rating 1: 6/10  · Location - Side 1: Right  · Location 1: knee  · Pain Addressed 1: Pre-medicate for activity    Patients cultural,  spiritual, Mandaen conflicts given the current situation: no    Objective:     Communicated with: nurseRe prior to session.  Patient found HOB elevated with bed alarm, flowers catheter, SCD, peripheral IV, FCD, telemetry upon OT entry to room.    General Precautions: Standard, fall, hearing impaired, diabetic   Orthopedic Precautions:RLE weight bearing as tolerated   Braces: N/A     Occupational Performance:    Bed Mobility:    · Patient completed Scooting/Bridging with contact guard assistance  · Patient completed Supine to Sit with contact guard assistance, minimum assistance and RLE management  · Patient completed Sit to Supine with contact guard assistance and with leg lift    Functional Mobility/Transfers:  · Patient completed Sit <> Stand Transfer with contact guard assistance  with  rolling walker   · Functional Mobility:  Pt ambulated ~50 ft with min A-CGA using RW.     Activities of Daily Living:  · Upper Body Dressing: contact guard assistance    · Lower Body Dressing: minimum assistance      Cognitive/Visual Perceptual:  Cognitive/Psychosocial Skills:     -       Oriented to: Person, Place, Time and Situation   -       Follows Commands/attention:Follows two-step commands  -       Communication: clear/fluent  -       Memory: Poor immediate recall  -       Safety awareness/insight to disability: impaired   -       Mood/Affect/Coping skills/emotional control: Appropriate to situation    Physical Exam:  Balance: -       fair  Postural examination/scapula alignment:    -       Forward head  Skin integrity: right knee incision covered by dressing  Edema:  Mild right knee  Dominant hand: -       right  Upper Extremity Range of Motion:     -       Right Upper Extremity: WFL  -       Left Upper Extremity: WFL  Upper Extremity Strength:    -       Right Upper Extremity: WFL  -       Left Upper Extremity: WFL    AMPAC 6 Click ADL:  AMPAC Total Score: 20    Treatment & Education:  The patient participated in OT  wicho and was educated re: OT role and POC. The patient was educated re: Polar Ice use and verbalized understanding.   Education:    Patient left HOB elevated with all lines intact, call button in reach and PT present    GOALS:   Multidisciplinary Problems     Occupational Therapy Goals        Problem: Occupational Therapy Goal    Goal Priority Disciplines Outcome Interventions   Occupational Therapy Goal     OT, PT/OT Ongoing, Progressing    Description:  Goals to be met by: 12/13/19    Patient will increase functional independence with ADLs by performing:    UE Dressing with Stand-by Assistance.  LE Dressing with Stand-by Assistance.  Grooming while standing at sink with Stand-by Assistance.  Toileting from toilet with Stand-by Assistance for hygiene and clothing management.   Supine to sit with Stand-by Assistance.  Step transfer with Stand-by Assistance  Toilet transfer to toilet with Stand-by Assistance.                      History:     Past Medical History:   Diagnosis Date    Arthritis     Diabetes mellitus     Hypertension        Past Surgical History:   Procedure Laterality Date    CATARACT EXTRACTION Bilateral     CERVIX REMOVAL N/A 9/16/2019    Procedure: Removal of cervix with culdoplasty ;  Surgeon: Sundar Alfonso MD;  Location: [facility] OR;  Service: OB/GYN;  Laterality: N/A;    COLONOSCOPY N/A 3/31/2017    Procedure: COLONOSCOPY;  Surgeon: Apolinar Matute MD;  Location: [facility] ENDO;  Service: Endoscopy;  Laterality: N/A;    HYSTERECTOMY  1995    OVARY SURGERY      TOTAL VAGINAL HYSTERECTOMY  9/16/2019    Procedure: HYSTERECTOMY, TOTAL, VAGINAL;  Surgeon: Sundar Alfonso MD;  Location: [facility] OR;  Service: OB/GYN;;       Time Tracking:     OT Date of Treatment: 12/06/19  OT Start Time: 1616  OT Stop Time: 1639  OT Total Time (min): 23 min    Billable Minutes:Evaluation 15 (with PT)  Total Time 23    Gretchen Taylor OT  12/7/2019

## 2019-12-07 NOTE — PLAN OF CARE
Problem: Occupational Therapy Goal  Goal: Occupational Therapy Goal  Description  Goals to be met by: 12/13/19    Patient will increase functional independence with ADLs by performing:    UE Dressing with Stand-by Assistance.  LE Dressing with Stand-by Assistance.  Grooming while standing at sink with Stand-by Assistance.  Toileting from toilet with Stand-by Assistance for hygiene and clothing management.   Supine to sit with Stand-by Assistance.  Step transfer with Stand-by Assistance  Toilet transfer to toilet with Stand-by Assistance.     Outcome: Ongoing, Progressing  The patient is progressing in OT with CGA needed for management of the RLE during bed mobility and CGA for BSC transfer. The patient states she has DME and assistance as needed for home and is ready for  D/C.

## 2019-12-07 NOTE — CONSULTS
TN sent patient's clinicals (Packet, MAR, and POC) to [facility]. Awaiting feedback.

## 2019-12-07 NOTE — PROGRESS NOTES
Orthopedic Postop Progress Note    Postop day: 1    ID: The patient is a 79 y.o. female status post: RIGHT TKA doing well      Overnight Events: nurse reports NAEON, pain controlled  AFVSS    Vitals:    12/07/19 0748   BP: 137/62   Pulse: 87   Resp: 18   Temp: 98.9 °F (37.2 °C)       Drain Output  12/06 0701 - 12/07 0700  In: 880   Out: 1600     Physical Exam:  NAD, A/O x 3.  Wound c/d/i with clean dressing.  No focal motor or sensory deficits noted.    Assessment: The patient is a 79 y.o. female status post: RIGHT TKA    Plan:  1) Antibiotics: post op abx x 24 hrs then PO upon dc for 7 days, rx in chart  2) Weight bearing status: WBAT  3) Labs: reviewed  4) DVT Prophylaxis: asa  5) Lines/Drains: piv  6) Dispo: home today with home health after PT

## 2019-12-07 NOTE — PROGRESS NOTES
[facility] CASE MANAGEMENT                  WRITTEN DISCHARGE INFORMATION      APPOINTMENTS AND RESOURCES TO HELP YOU MANAGE YOUR CARE AT HOME BASED ON YOUR PREFERENCES:  (If an appointment is not scheduled for you when you leave the hospital, call your doctor to schedule a follow up visit within a week)    Follow-up Information     Douglas Corbin MD In 2 weeks.    Specialty:  Orthopedic Surgery  Why:  Please call to schedule your Post op appt in 2 weeks   Contact information:  [address] [address]  [phone]             [facility].    Specialty:  Home Health Services  Why:  Nurse will call you to arrange your home visits  Contact information:  [address] [address]  [phone]                   Healthy Living Instructions to HELP MANAGE YOUR CARE AT HOME:  Things You are responsible for:  1.    Getting your prescriptions filled   2.    Taking your medications as directed, DO NOT MISS ANY DOSES!  3.    Following the diet and exercise recommended by your doctor  4.    Going to your follow-up doctor appointment. This is important because it allows the doctor to monitor your progress and determine if any changes need to made to your treatment plan.  5. If you have any questions about MANAGING YOUR CARE AT HOME Call the Nurse Care Line for 24/7 Assistance [phone]       Please answer any calls you may receive from [facility]. We want to continue to support you as you manage your healthcare needs. [facility] is happy to have the opportunity to serve you.      Thank you for choosing [facility] for your healthcare needs!  Your [facility] Case Management Team,

## 2019-12-07 NOTE — PT/OT/SLP DISCHARGE
Occupational Therapy Discharge Summary    Radha Morales  MRN: [id number]   Principal Problem: Osteoarthritis of right knee      Patient Discharged from acute Occupational Therapy on 12/7/19.  Please refer to prior OT notes for functional status.    Assessment:      Patient appropriate for care in another setting.    Objective:     GOALS:   Multidisciplinary Problems     Occupational Therapy Goals        Problem: Occupational Therapy Goal    Goal Priority Disciplines Outcome Interventions   Occupational Therapy Goal     OT, PT/OT Ongoing, Progressing    Description:  Goals to be met by: 12/13/19    Patient will increase functional independence with ADLs by performing:    UE Dressing with Stand-by Assistance.  LE Dressing with Stand-by Assistance.  Grooming while standing at sink with Stand-by Assistance.  Toileting from toilet with Stand-by Assistance for hygiene and clothing management.   Supine to sit with Stand-by Assistance.  Step transfer with Stand-by Assistance  Toilet transfer to toilet with Stand-by Assistance.                      Reasons for Discontinuation of Therapy Services  Transfer to alternate level of care.      Plan:     Patient Discharged to: Home with Home Health Service    Gretchen Taylor OT  12/7/2019

## 2019-12-07 NOTE — NURSING
Patient dc home via wheelchair with transporter polar ice and walker; family in lobby per pt. No distress noted.

## 2019-12-07 NOTE — PT/OT/SLP PROGRESS
Physical Therapy Treatment and Discharge     Patient Name:  Radha Morales   MRN:  [id number]    Recommendations:     Discharge Recommendations:  home health PT(with assist from daughter and grandson )   Discharge Equipment Recommendations: none   Barriers to discharge: None    Assessment:     Radha Morales is a 79 y.o. female admitted with a medical diagnosis of Osteoarthritis of right knee.  She presents with the following impairments/functional limitations:  decreased lower extremity function, impaired functional mobilty, gait instability, decreased ROM, pain, edema, orthopedic precautions, impaired joint extensibility, impaired skin, decreased safety awareness.    Patient tolerated PT session well. She ambulated 80ft with RW and SBA. She performed R LE therex x15 reps with handout provided for therex at home. She is ready to discharge home from PT standpoint. RN notified.     Rehab Prognosis: Good; patient would benefit from acute skilled PT services to address these deficits and reach maximum level of function.    Recent Surgery: Procedure(s) (LRB):  ARTHROPLASTY, KNEE, TOTAL ESTUARDO (Right) 1 Day Post-Op    Plan:     During this hospitalization, patient to be seen BID to address the identified rehab impairments via gait training, therapeutic activities, therapeutic exercises and progress toward the following goals:    · Plan of Care Expires:  12/20/19    Subjective     Chief Complaint: Pain in right knee.   Patient/Family Comments/goals: To walk without pain.   Pain/Comfort:  · Pain Rating 1: 5/10  · Location - Side 1: Right  · Location 1: knee  · Pain Addressed 1: Pre-medicate for activity, Reposition, Cessation of Activity      Objective:     Communicated with RN prior to session.  Patient found up in chair with telemetry, peripheral IV, cryotherapy upon PT entry to room.     General Precautions: Standard, fall, diabetic, hearing impaired   Orthopedic Precautions:RLE weight bearing as tolerated   Braces: N/A      Functional Mobility:  · Transfers:     · Sit to Stand:  stand by assistance with rolling walker x1 from bedside chair with verbal cues for hand placement  · Gait:  Patient ambulated 80ft with Rolling Walker and SBA using 3-point gait. Patient demonstrated decreased zita, decreased velocity of limb motion and decreased step length during gait due to impaired balance, pain, decreased ROM and decreased strength. She had 1 LOB initially upon standing but was able to recover with min A.       AM-PAC 6 CLICK MOBILITY  Turning over in bed (including adjusting bedclothes, sheets and blankets)?: 4  Sitting down on and standing up from a chair with arms (e.g., wheelchair, bedside commode, etc.): 4  Moving from lying on back to sitting on the side of the bed?: 4  Moving to and from a bed to a chair (including a wheelchair)?: 4  Need to walk in hospital room?: 4  Climbing 3-5 steps with a railing?: 3  Basic Mobility Total Score: 23       Therapeutic Activities and Exercises:  Patient educated in and performed R LE exercises x15 reps for ankle pumps, quad set, glute set, heel slides, hip abd/add, SLR, and LAQ.       Patient educated in:  -PT role and POC  -safety with transfers including hand placement  -gait sequencing and RW management  -OOB activity to maximize recovery including ambulating every hour to hour and a half at home to prevent DVT   -car transfer  -HEP for therex at home with handout provided       Patient left up in chair with all lines intact, call button in reach and RN notified..    GOALS:   Multidisciplinary Problems     Physical Therapy Goals     Not on file          Multidisciplinary Problems (Resolved)        Problem: Physical Therapy Goal    Goal Priority Disciplines Outcome Goal Variances Interventions   Physical Therapy Goal   (Resolved)     PT, PT/OT Met     Description:  Goals to be met by: 19     Patient will increase functional independence with mobility by performin. Supine to  sit with Modified Mathews  2. Rolling to Left and Right with Modified Mathews  3. Sit to stand transfer with Modified Mathews using RW  4. Bed to chair transfer with Modified Mathews using Rolling Walker  5. Gait >150 feet with Modified Mathews using Rolling Walker   6. Lower extremity exercise program 2 sets x10 reps per handout, with independence  7. R knee ROM 0-90*                      Time Tracking:     PT Received On: 12/07/19  PT Start Time: 1007     PT Stop Time: 1030  PT Total Time (min): 23 min     Billable Minutes: Gait Training 13 and Therapeutic Exercise 10    Treatment Type: Treatment  PT/PTA: PT       Adrianna Yanez, PT  12/07/2019

## 2019-12-11 NOTE — ANESTHESIA POSTPROCEDURE EVALUATION
Anesthesia Post Evaluation    Patient: Radha Morales    Procedure(s) Performed: Procedure(s) (LRB):  ARTHROPLASTY, KNEE, TOTAL ESTUARDO (Right)    Final Anesthesia Type: epidural    Patient location during evaluation: PACU  Patient participation: Yes- Able to Participate  Level of consciousness: awake and alert and oriented  Post-procedure vital signs: reviewed and stable  Pain management: adequate  Airway patency: patent    PONV status at discharge: No PONV  Anesthetic complications: no      Cardiovascular status: blood pressure returned to baseline, stable and hemodynamically stable  Respiratory status: unassisted, room air and spontaneous ventilation  Hydration status: euvolemic  Follow-up not needed.          Vitals Value Taken Time   /97 12/6/2019  1100 AM   Temp 36.5 (97.7) 12/6/2019  1100 AM   Pulse 56 12/6/2019  1100 AM   Resp 14 12/6/2019  1100 AM   SpO2 99 % 12/6/2019  1100 AM         Event Time     Out of Recovery 14:46:18          Pain/Karlee Score: No data recorded

## 2019-12-12 NOTE — PROGRESS NOTES
SATISH contacted Ben with [facility] to determine why pt was not seen for HH. According to Ben, he will look into case and contact SW with more information.     SATISH received a call from Ben explaining PHN never provided them with authorization and referral was left in the que. Per Ben, he will have all disciplines out to follow-up with pt on 12/12.

## 2020-07-07 ENCOUNTER — OFFICE VISIT (OUTPATIENT)
Dept: OBSTETRICS AND GYNECOLOGY | Facility: CLINIC | Age: 80
End: 2020-07-07
Payer: MEDICARE

## 2020-07-07 VITALS
SYSTOLIC BLOOD PRESSURE: 140 MMHG | WEIGHT: 181.88 LBS | DIASTOLIC BLOOD PRESSURE: 72 MMHG | HEIGHT: 65 IN | BODY MASS INDEX: 30.3 KG/M2 | TEMPERATURE: 98 F

## 2020-07-07 DIAGNOSIS — Z90.710 STATUS POST VAGINAL HYSTERECTOMY: Primary | ICD-10-CM

## 2020-07-07 DIAGNOSIS — Z78.0 MENOPAUSE: ICD-10-CM

## 2020-07-07 DIAGNOSIS — N95.2 ATROPHIC VAGINITIS: ICD-10-CM

## 2020-07-07 PROCEDURE — 3078F DIAST BP <80 MM HG: CPT | Mod: CPTII,S$GLB,, | Performed by: OBSTETRICS & GYNECOLOGY

## 2020-07-07 PROCEDURE — 1126F PR PAIN SEVERITY QUANTIFIED, NO PAIN PRESENT: ICD-10-PCS | Mod: S$GLB,,, | Performed by: OBSTETRICS & GYNECOLOGY

## 2020-07-07 PROCEDURE — 3077F PR MOST RECENT SYSTOLIC BLOOD PRESSURE >= 140 MM HG: ICD-10-PCS | Mod: CPTII,S$GLB,, | Performed by: OBSTETRICS & GYNECOLOGY

## 2020-07-07 PROCEDURE — 99213 PR OFFICE/OUTPT VISIT, EST, LEVL III, 20-29 MIN: ICD-10-PCS | Mod: S$GLB,,, | Performed by: OBSTETRICS & GYNECOLOGY

## 2020-07-07 PROCEDURE — 99213 OFFICE O/P EST LOW 20 MIN: CPT | Mod: S$GLB,,, | Performed by: OBSTETRICS & GYNECOLOGY

## 2020-07-07 PROCEDURE — 1126F AMNT PAIN NOTED NONE PRSNT: CPT | Mod: S$GLB,,, | Performed by: OBSTETRICS & GYNECOLOGY

## 2020-07-07 PROCEDURE — 3078F PR MOST RECENT DIASTOLIC BLOOD PRESSURE < 80 MM HG: ICD-10-PCS | Mod: CPTII,S$GLB,, | Performed by: OBSTETRICS & GYNECOLOGY

## 2020-07-07 PROCEDURE — 1101F PT FALLS ASSESS-DOCD LE1/YR: CPT | Mod: CPTII,S$GLB,, | Performed by: OBSTETRICS & GYNECOLOGY

## 2020-07-07 PROCEDURE — 1159F MED LIST DOCD IN RCRD: CPT | Mod: S$GLB,,, | Performed by: OBSTETRICS & GYNECOLOGY

## 2020-07-07 PROCEDURE — 99999 PR PBB SHADOW E&M-EST. PATIENT-LVL III: CPT | Mod: PBBFAC,,, | Performed by: OBSTETRICS & GYNECOLOGY

## 2020-07-07 PROCEDURE — 3077F SYST BP >= 140 MM HG: CPT | Mod: CPTII,S$GLB,, | Performed by: OBSTETRICS & GYNECOLOGY

## 2020-07-07 PROCEDURE — 99999 PR PBB SHADOW E&M-EST. PATIENT-LVL III: ICD-10-PCS | Mod: PBBFAC,,, | Performed by: OBSTETRICS & GYNECOLOGY

## 2020-07-07 PROCEDURE — 1101F PR PT FALLS ASSESS DOC 0-1 FALLS W/OUT INJ PAST YR: ICD-10-PCS | Mod: CPTII,S$GLB,, | Performed by: OBSTETRICS & GYNECOLOGY

## 2020-07-07 PROCEDURE — 1159F PR MEDICATION LIST DOCUMENTED IN MEDICAL RECORD: ICD-10-PCS | Mod: S$GLB,,, | Performed by: OBSTETRICS & GYNECOLOGY

## 2020-07-07 RX ORDER — ESTRADIOL 0.1 MG/G
CREAM VAGINAL
Qty: 42.5 G | Refills: 2 | Status: SHIPPED | OUTPATIENT
Start: 2020-07-07

## 2020-07-07 RX ORDER — MELOXICAM 15 MG/1
TABLET ORAL
COMMUNITY
Start: 2020-06-15

## 2020-07-07 NOTE — PROGRESS NOTES
Subjective:       Patient ID: Radha Morales is a 79 y.o. female.    Chief Complaint:  Follow-up (6 months follow up estrace cream)      History of Present Illness  HPI  Patient comes in today for follow-up  Status post total vaginal hysterectomy with Porras culdoplasty for prolapse on 2019 after pessary placement.  Placed on Estrace cream postoperatively.    Doing well on the cream, using it twice a week.    Now status post right knee replacement.    GYN & OB History  No LMP recorded. Patient has had a hysterectomy.   Date of Last Pap: No result found    OB History    Para Term  AB Living   7 7 7 0 0 7   SAB TAB Ectopic Multiple Live Births                  # Outcome Date GA Lbr Kunal/2nd Weight Sex Delivery Anes PTL Lv   7 Term            6 Term            5 Term            4 Term            3 Term            2 Term            1 Term              Past Medical History:   Diagnosis Date    Arthritis     Diabetes mellitus     Hypertension        Past Surgical History:   Procedure Laterality Date    CATARACT EXTRACTION Bilateral     CERVIX REMOVAL N/A 2019    Procedure: Removal of cervix with culdoplasty ;  Surgeon: Sundar Alfonso MD;  Location: [facility] OR;  Service: OB/GYN;  Laterality: N/A;    COLONOSCOPY N/A 3/31/2017    Procedure: COLONOSCOPY;  Surgeon: Apolinar Matute MD;  Location: [facility] ENDO;  Service: Endoscopy;  Laterality: N/A;    HYSTERECTOMY  1995    OVARY SURGERY      TOTAL KNEE ARTHROPLASTY Right 2019    Procedure: ARTHROPLASTY, KNEE, TOTAL ESTUARDO;  Surgeon: Douglas Corbin MD;  Location: [facility] OR;  Service: Orthopedics;  Laterality: Right;  SUPINE---NEED H/P---  ESTUARDO COBB [id number] TEXTED HIM @ 7:18AM ON 19  RN PREOP--19---has PCP clearance from Dr. Figueroa-moderate risk    TOTAL VAGINAL HYSTERECTOMY  2019    Procedure: HYSTERECTOMY, TOTAL, VAGINAL;  Surgeon: Sundar Alfonso MD;  Location: [facility] OR;  Service: OB/GYN;;       Family History   Problem  Relation Age of Onset    Diabetes Father     Diabetes Mother        Social History     Socioeconomic History    Marital status:      Spouse name: Not on file    Number of children: Not on file    Years of education: Not on file    Highest education level: Not on file   Occupational History    Not on file   Social Needs    Financial resource strain: Not on file    Food insecurity     Worry: Not on file     Inability: Not on file    Transportation needs     Medical: Not on file     Non-medical: Not on file   Tobacco Use    Smoking status: Never Smoker    Smokeless tobacco: Never Used   Substance and Sexual Activity    Alcohol use: No    Drug use: No    Sexual activity: Never     Birth control/protection: None   Lifestyle    Physical activity     Days per week: Not on file     Minutes per session: Not on file    Stress: Not on file   Relationships    Social connections     Talks on phone: Not on file     Gets together: Not on file     Attends Zoroastrian service: Not on file     Active member of club or organization: Not on file     Attends meetings of clubs or organizations: Not on file     Relationship status: Not on file   Other Topics Concern    Not on file   Social History Narrative    .  .    Ex-     Lives alone       Current Outpatient Medications   Medication Sig Dispense Refill    amlodipine-benazepril 10-20mg (LOTREL) 10-20 mg per capsule Take 1 capsule by mouth once daily.      ammonium lactate 12 % Crea   10    aspirin (ECOTRIN) 81 MG EC tablet Take 1 tablet (81 mg total) by mouth 2 (two) times daily with meals. 58 tablet 0    cefadroxil (DURICEF) 500 MG Cap Take 1 capsule (500 mg total) by mouth every 12 (twelve) hours. 14 capsule 0    chlorthalidone (HYGROTEN) 25 MG Tab Take 25 mg by mouth once daily.      estradioL (ESTRACE) 0.01 % (0.1 mg/gram) vaginal cream PLACE 1 GRAM VAGINALLY EVERY DAY 42.5 g 2    latanoprost 0.005 % ophthalmic solution  INSTILL 1 DROP INTO BOTH EYES QHS  3    losartan (COZAAR) 100 MG tablet TK 1 T PO D  0    meloxicam (MOBIC) 15 MG tablet       metformin (GLUCOPHAGE) 1000 MG tablet Take 1,000 mg by mouth 2 (two) times daily with meals.      multivit,iron,minerals/lutein (CENTRUM SILVER ULTRA WOMEN'S ORAL) Take by mouth.      rosuvastatin (CRESTOR) 5 MG tablet   3     No current facility-administered medications for this visit.        Review of patient's allergies indicates:  No Known Allergies    Review of Systems  Review of Systems   Constitutional: Negative for activity change, appetite change, fever and unexpected weight change.   Respiratory: Negative for cough, shortness of breath and wheezing.    Cardiovascular: Negative for chest pain and palpitations.   Gastrointestinal: Negative for abdominal pain and vomiting.   Endocrine: Negative for hot flashes.   Genitourinary: Negative for dysmenorrhea, dyspareunia, frequency, pelvic pain, urgency, vaginal bleeding, vaginal discharge and postcoital bleeding.   Musculoskeletal: Negative for back pain and myalgias.   Integumentary:  Negative for rash, breast mass and nipple discharge.   Neurological: Positive for headaches. Negative for seizures.   Psychiatric/Behavioral: Negative for depression and sleep disturbance. The patient is not nervous/anxious.    Breast: Negative for mass, mastodynia and nipple discharge          Objective:    Physical Exam:   Constitutional: She appears well-developed and well-nourished. No distress.    HENT:   Head: Normocephalic and atraumatic.    Eyes: EOM are normal.    Neck: Normal range of motion.     Pulmonary/Chest: Effort normal. No respiratory distress.   Breasts: Non-tender, no engorgement, no masses, no retraction, no discharge. Negative for lymphadenopathy.         Abdominal: Soft. She exhibits no distension. There is no abdominal tenderness. There is no rebound and no guarding.     Genitourinary:    Vagina normal.      Genitourinary  Comments: Minimal to moderate atrophy.  Vulva without any obvious lesions.  Urethral meatus normal size and location without any lesion.  Urethra is non-tender without stricture or discharge.  Bladder is non-tender.  Vaginal vault with good support.  Minimal white discharge noted.  No obvious lesion.  Normal rugation.  Cervix and Uterus are surgically absent.  Adnexa is without any masses or tenderness.  Perineum without obvious lesion.               Musculoskeletal: Normal range of motion.       Neurological: She is alert.    Skin: Skin is warm and dry.    Psychiatric: She has a normal mood and affect.          Assessment:        1.  Menopausal  2.  Atrophic vaginitis  3.  Status post vaginal hysterectomy          Plan:      I have discussed with the patient regarding her condition.  She is doing well on Estrace cream    She will continue to use the cream once or twice a week.    Back next year for follow-up  Colonoscopy done at [facility] on 3/30/2017.  But needs to be repeated this year because of inadequate visualization secondary to only fair prep.    Getting her left knee done within the next year.

## 2022-11-01 NOTE — HPI
Cardiothoracic Surgery Progress Note    CC: coronary artery disease, s/p CABGx3 KELLY clip  POD# 11     Subjective:  Hemodynamically stable, on 4L at rest, sat was 75% on room air this morning, afebrile. Alert and oriented X 3. Weight 70.1kg this am. Sinus kennedi this AM     10/24 Was atrial paced overnight due to pauses  10/25: TPW, chest tubes, valdovinos removed  10/26: O2 requirements between 8-10L, CXR revealed atelectasis and small pleural effusions. Pulmonology consulted, IV steroids and doxy initiated  10/31: Remains on oxygen, desaturates with ambulation. Wt: 70.1kg Pre-op: 77.6kg    Vital Signs:   BP (!) 124/55   Pulse 57   Temp 98 °F (36.7 °C) (Temporal)   Resp 16   Ht 6' (1.829 m)   Wt 154 lb 8.7 oz (70.1 kg)   SpO2 96%   BMI 20.96 kg/m²       Physical Exam:   Cardiac: regular, no murmur  Lungs: clear to auscultation, decreased bases bilaterally  Abdomen:  abdomen soft, non-tender BM 10/26  Vascular:  pulses all palpable   Extremities: generalized, non-pitting edema 1+  :  /900  Lasix 40mg IV daily  Chest Tube(s) & Incision(s):    Sternum: stable,C/D/I  Edges approximated, no drainage  Chest tube site: chest tubes removed, site C/D/I Purse string intact   left leg incision: C/D/I  Edges well approximated, no drainage TE      Labs:   CBC:   Recent Labs     10/30/22  0210 10/31/22  0618   WBC 9.5 9.1   HGB 8.9* 9.3*   HCT 28.4* 29.5*    382     BMP:   Recent Labs     10/30/22  0210 10/30/22  1435 10/31/22  0618   K 3.3* 4.7 3.9   CREATININE 1.7*  --  1.6*   CALCIUM 9.9  --  9.6   MG 1.90  --   --      CXR:  10/31/22  Stable examination status post CABG. Assessment/Plan:  As per CC:     S/P CABG  -81mg asa, sub q heparin TID, 75mg Plavix daily  -Pepcid  -statin  -continue bowel regimen     Acute Respiratory Insufficiency, COPD     -on 2-4L at rest  -pulmonary toileting, out of bed, IS, acapella, wean O2, Ezpap  -po steroids.  Antibiotics     A.fib  -now sinus kennedi  -amiodarone Patient is 75 yo female with HTN and NIDDM who presents to ED with complaint of dizziness xfew months duration. Patient reports dizziness occurs only when she gets up and walks. She is not dizzy when sitting or lying. She has no associated symptoms and denies fever/chills, HA, SOB, CP, abdominal pain, N/V, dysuria, or recent trauma. She currently takes metformin and one blood pressure medication every morning. States BG runs from 100-130. She does endorse beginning ambien for sleep around the same time symptoms arose. She has no history of cardiovascular disease or arrhythmia. On presentation patient with negative initial troponin and non ischemic EKG. Unremarkable CXR and head CT. Grossly normal labs and vitals. Placed in observation for further evaluation.    protocol  -BB held due to profound bradycardia     CKD  -nephrology following  -creat 1.7-> 1.6  -lasix 40mg po daily  -holding ACE/ARB, digoxin     DM  -carb control diet  -insulin     Disp:  Plan for home with home care with oxygen      Electronically signed by ANTHONY Newman - CNP on 11/1/2022 at 8:42 AM   Note reviewed, events of last 24 hours reviewed along with vital signs and I/Os and patient examined. Assessment and plans discussed and are as outlined above.      Gerson Billings MD, FACS, [facility], FACCP, [place]

## 2023-01-13 NOTE — PLAN OF CARE
Problem: Patient Care Overview  Goal: Plan of Care Review  Outcome: Ongoing (interventions implemented as appropriate)  Colonoscopy done today.  No bleeding.  VSS,  Pressure ulcer & fall prevention interventions on-going.       Cyclosporine Counseling:  I discussed with the patient the risks of cyclosporine including but not limited to hypertension, gingival hyperplasia,myelosuppression, immunosuppression, liver damage, kidney damage, neurotoxicity, lymphoma, and serious infections. The patient understands that monitoring is required including baseline blood pressure, CBC, CMP, lipid panel and uric acid, and then 1-2 times monthly CMP and blood pressure.

## 2024-06-07 NOTE — ANESTHESIA PROCEDURE NOTES
Spinal    Diagnosis: IUP   Patient location during procedure: OR  Start time: 12/6/2019 7:06 AM  Timeout: 12/6/2019 7:05 AM  End time: 12/6/2019 7:10 AM    Staffing  Authorizing Provider: Avani Mendez MD  Performing Provider: Avani Mendez MD    Preanesthetic Checklist  Completed: patient identified, site marked, surgical consent, pre-op evaluation, timeout performed, IV checked, risks and benefits discussed and monitors and equipment checked  Spinal Block  Patient position: sitting  Prep: ChloraPrep  Patient monitoring: heart rate, continuous pulse ox and frequent blood pressure checks  Approach: midline  Location: L3-4  Injection technique: single shot  CSF Fluid: clear free-flowing CSF  Needle  Needle type: pencil-tip   Needle gauge: 25 G  Needle length: 3.5 in  Additional Documentation: incremental injection, no paresthesia on injection and negative aspiration for heme  Needle localization: anatomical landmarks  Assessment  Sensory level: T4   Dermatomal levels determined by pinch or prick  Ease of block: easy  Patient's tolerance of the procedure: comfortable throughout block          
Opt out

## 2025-07-06 ENCOUNTER — HOSPITAL ENCOUNTER (EMERGENCY)
Facility: HOSPITAL | Age: 85
Discharge: HOME | End: 2025-07-06
Attending: EMERGENCY MEDICINE
Payer: MEDICARE

## 2025-07-06 VITALS
HEIGHT: 65 IN | TEMPERATURE: 98 F | BODY MASS INDEX: 29.16 KG/M2 | DIASTOLIC BLOOD PRESSURE: 73 MMHG | HEART RATE: 68 BPM | WEIGHT: 175 LBS | SYSTOLIC BLOOD PRESSURE: 145 MMHG | RESPIRATION RATE: 18 BRPM | OXYGEN SATURATION: 99 %

## 2025-07-06 DIAGNOSIS — R53.81 MALAISE: ICD-10-CM

## 2025-07-06 DIAGNOSIS — I10 HYPERTENSION, UNSPECIFIED TYPE: ICD-10-CM

## 2025-07-06 DIAGNOSIS — R42 DISEQUILIBRIUM: Primary | ICD-10-CM

## 2025-07-06 DIAGNOSIS — R73.9 HYPERGLYCEMIA: ICD-10-CM

## 2025-07-06 LAB
ABSOLUTE EOSINOPHIL (OHS): 0.01 K/UL
ABSOLUTE MONOCYTE (OHS): 0.41 K/UL (ref 0.3–1)
ABSOLUTE NEUTROPHIL COUNT (OHS): 3.85 K/UL (ref 1.8–7.7)
ALBUMIN SERPL BCP-MCNC: 3.8 G/DL (ref 3.5–5.2)
ALP SERPL-CCNC: 51 UNIT/L (ref 40–150)
ALT SERPL W/O P-5'-P-CCNC: 18 UNIT/L (ref 10–44)
ANION GAP (OHS): 13 MMOL/L (ref 8–16)
AST SERPL-CCNC: 21 UNIT/L (ref 11–45)
BASOPHILS # BLD AUTO: 0.03 K/UL
BASOPHILS NFR BLD AUTO: 0.6 %
BILIRUB SERPL-MCNC: 0.6 MG/DL (ref 0.1–1)
BUN SERPL-MCNC: 20 MG/DL (ref 8–23)
CALCIUM SERPL-MCNC: 9.6 MG/DL (ref 8.7–10.5)
CHLORIDE SERPL-SCNC: 101 MMOL/L (ref 95–110)
CO2 SERPL-SCNC: 21 MMOL/L (ref 23–29)
CREAT SERPL-MCNC: 1.1 MG/DL (ref 0.5–1.4)
ERYTHROCYTE [DISTWIDTH] IN BLOOD BY AUTOMATED COUNT: 13.7 % (ref 11.5–14.5)
GFR SERPLBLD CREATININE-BSD FMLA CKD-EPI: 50 ML/MIN/1.73/M2
GLUCOSE SERPL-MCNC: 193 MG/DL (ref 70–110)
HCT VFR BLD AUTO: 38.6 % (ref 37–48.5)
HGB BLD-MCNC: 12.6 GM/DL (ref 12–16)
IMM GRANULOCYTES # BLD AUTO: 0 K/UL (ref 0–0.04)
IMM GRANULOCYTES NFR BLD AUTO: 0 % (ref 0–0.5)
LYMPHOCYTES # BLD AUTO: 1.06 K/UL (ref 1–4.8)
MAGNESIUM SERPL-MCNC: 1.8 MG/DL (ref 1.6–2.6)
MCH RBC QN AUTO: 28.3 PG (ref 27–31)
MCHC RBC AUTO-ENTMCNC: 32.6 G/DL (ref 32–36)
MCV RBC AUTO: 87 FL (ref 82–98)
NUCLEATED RBC (/100WBC) (OHS): 0 /100 WBC
PLATELET # BLD AUTO: 259 K/UL (ref 150–450)
PMV BLD AUTO: 9.4 FL (ref 9.2–12.9)
POCT GLUCOSE: 213 MG/DL (ref 70–110)
POTASSIUM SERPL-SCNC: 3.4 MMOL/L (ref 3.5–5.1)
PROT SERPL-MCNC: 7.9 GM/DL (ref 6–8.4)
RBC # BLD AUTO: 4.45 M/UL (ref 4–5.4)
RELATIVE EOSINOPHIL (OHS): 0.2 %
RELATIVE LYMPHOCYTE (OHS): 19.8 % (ref 18–48)
RELATIVE MONOCYTE (OHS): 7.6 % (ref 4–15)
RELATIVE NEUTROPHIL (OHS): 71.8 % (ref 38–73)
SODIUM SERPL-SCNC: 135 MMOL/L (ref 136–145)
WBC # BLD AUTO: 5.36 K/UL (ref 3.9–12.7)

## 2025-07-06 PROCEDURE — 93005 ELECTROCARDIOGRAM TRACING: CPT

## 2025-07-06 PROCEDURE — 83735 ASSAY OF MAGNESIUM: CPT | Performed by: EMERGENCY MEDICINE

## 2025-07-06 PROCEDURE — 80053 COMPREHEN METABOLIC PANEL: CPT | Performed by: EMERGENCY MEDICINE

## 2025-07-06 PROCEDURE — 85025 COMPLETE CBC W/AUTO DIFF WBC: CPT | Performed by: EMERGENCY MEDICINE

## 2025-07-06 PROCEDURE — 93010 ELECTROCARDIOGRAM REPORT: CPT | Mod: ,,, | Performed by: INTERNAL MEDICINE

## 2025-07-06 PROCEDURE — 82962 GLUCOSE BLOOD TEST: CPT

## 2025-07-06 PROCEDURE — 99284 EMERGENCY DEPT VISIT MOD MDM: CPT | Mod: 25

## 2025-07-06 NOTE — DISCHARGE INSTRUCTIONS
Follow your diabetic diet.  Please eat mostly beat vegetables and a small amount of wheat crackers, and fruit.  Please avoid bread rice pasta desserts sweets potatoes.  Return immediately if you get worse or if new problems develop.  Please follow up with the your primary care doctor this week.  Rest.

## 2025-07-06 NOTE — ED NOTES
Orthostatics performed  Lying HR 78 /59  Sitting HR 83 /60  Standing HR 89 /61    Will notify provider.

## 2025-07-06 NOTE — ED PROVIDER NOTES
"Encounter Date: 7/6/2025       History     Chief Complaint   Patient presents with    General Illness     Pt to ER with reports of "not feeling well". Pt stated "I'm feeling a little better now, but this morning I was feeling dizzy and I think its my pressure or my sugar". Pt denies CP, SOB, blurred vision, N/V. No c/o pain or discomfort at this time.      HPI  This 84-year-old black female presents emergency room complaining that her blood sugar is high.  She reports that she is not feeling well.  She is little bit dizzy.  Dizzy mid some disequilibrium when she walks.  This is actually a chronic problem.  She is concerned about her blood sugar in his blood pressure.  She has no chest pain pressure tightness.  There are no neurologic deficits.  Her disequilibrium when she walks as a chronic problem.  She has no dysuria there is no history of fever chest pain or shortness of breath.  She has no abdominal complaints.   Review of patient's allergies indicates:  No Known Allergies  Past Medical History:   Diagnosis Date    Arthritis     Diabetes mellitus     Hypertension      Past Surgical History:   Procedure Laterality Date    CATARACT EXTRACTION Bilateral     CERVIX REMOVAL N/A 9/16/2019    Procedure: Removal of cervix with culdoplasty ;  Surgeon: Sundar Alfonso MD;  Location: [facility] OR;  Service: OB/GYN;  Laterality: N/A;    COLONOSCOPY N/A 3/31/2017    Procedure: COLONOSCOPY;  Surgeon: Apolinar Matute MD;  Location: [facility] ENDO;  Service: Endoscopy;  Laterality: N/A;    HYSTERECTOMY  1995    OVARY SURGERY      TOTAL KNEE ARTHROPLASTY Right 12/6/2019    Procedure: ARTHROPLASTY, KNEE, TOTAL ESTUARDO;  Surgeon: Douglas Corbin MD;  Location: [facility] OR;  Service: Orthopedics;  Laterality: Right;  SUPINE---NEED H/P---  ESTUARDO MCGUIRE EDUARDO COBB [phone] TEXTED HIM @ 7:18AM ON 11-5-19  RN PREOP--11/27/19---has PCP clearance from Dr. Figueroa-moderate risk    TOTAL VAGINAL HYSTERECTOMY  9/16/2019    Procedure: HYSTERECTOMY, TOTAL, " VAGINAL;  Surgeon: Sundar Alfonso MD;  Location: [facility] OR;  Service: OB/GYN;;     Family History   Problem Relation Name Age of Onset    Diabetes Father      Diabetes Mother       Social History[1]  Review of Systems  The patient was questioned specifically with regard to the following.  General: Fever, chills, sweats. Neuro: Headache. Eyes: eye problems. ENT: Ear pain, sore throat. Cardiovascular: Chest pain. Respiratory: Cough, shortness of breath. Gastrointestinal: Abdominal pain, vomiting, diarrhea. Genitourinary: Painful urination.  Musculoskeletal: Arm and leg problems. Skin: Rash.  The review of systems was negative except for the following:  Chronic disequilibrium, blood sugar high, not feeling well.  Physical Exam     Initial Vitals [07/06/25 1411]   BP Pulse Resp Temp SpO2   128/62 87 18 98.3 °F (36.8 °C) 95 %      MAP       --         Physical Exam  The patient was examined specifically for the following:   General:No significant distress, Good color, Warm and dry. Head and neck:Scalp atraumatic, Neck supple. Neurological:Appropriate conversation, Gross motor deficits. Eyes:Conjugate gaze, Clear corneas. ENT: No epistaxis. Cardiac: Regular rate and rhythm, Grossly normal heart tones. Pulmonary: Wheezing, Rales. Gastrointestinal: Abdominal tenderness, Abdominal distention. Musculoskeletal: Extremity deformity, Apparent pain with range of motion of the joints. Skin: Rash.   The findings on examination were normal .  I walk this patient there was no ataxia.  Status examination, cranial nerves, motor and sensory examination are normal.  Lungs are clear.  The heart tones are normal.  The abdomen is soft.  Extremities are nontender there is no pain with range of motion any joints.  ED Course   Procedures  Labs Reviewed   COMPREHENSIVE METABOLIC PANEL - Abnormal       Result Value    Sodium 135 (*)     Potassium 3.4 (*)     Chloride 101      CO2 21 (*)     Glucose 193 (*)     BUN 20      Creatinine 1.1      Calcium  9.6      Protein Total 7.9      Albumin 3.8      Bilirubin Total 0.6      ALP 51      AST 21      ALT 18      Anion Gap 13      eGFR 50 (*)    POCT GLUCOSE - Abnormal    POCT Glucose 213 (*)    MAGNESIUM - Normal    Magnesium  1.8     CBC WITH DIFFERENTIAL - Normal    WBC 5.36      RBC 4.45      HGB 12.6      HCT 38.6      MCV 87      MCH 28.3      MCHC 32.6      RDW 13.7      Platelet Count 259      MPV 9.4      Nucleated RBC 0      Neut % 71.8      Lymph % 19.8      Mono % 7.6      Eos % 0.2      Basophil % 0.6      Imm Grans % 0.0      Neut # 3.85      Lymph # 1.06      Mono # 0.41      Eos # 0.01      Baso # 0.03      Imm Grans # 0.00     CBC W/ AUTO DIFFERENTIAL    Narrative:     The following orders were created for panel order CBC Auto Differential.  Procedure                               Abnormality         Status                     ---------                               -----------         ------                     CBC with Differential[[id number]]       Normal              Final result                 Please view results for these tests on the individual orders.     EKG Readings: (Independently Interpreted)   This patient is in a normal sinus rhythm with a heart rate in 97 she has a first-degree AV block there is poor R-wave progression across the precordium.  There is left axis deviation.  There is no definite evidence of acute myocardial infarction or malignant arrhythmia.  There are no significant ST segment or T-wave changes.       Imaging Results    None          Medications - No data to display  Medical Decision Making  Amount and/or Complexity of Data Reviewed  Labs: ordered.    Given the above this patient presents emergency room with a malaise.  Does not feel well.  She reports high blood pressure.  Her blood pressure is 148/71.  This is not high enough to correct emergently.  She is worried about high glucose.  Her glucose is about 200.  This is not high enough to correct emergently.  The  patient denies painful urination I doubt urinary tract infection.  Chemistries reveal a trivial depression of the sodium of potassium at 1:35 a.m. and 3.4.  This is not significant.  The CO2 is slightly low at 21.  The anion gap is normal.  I doubt diabetic ketoacidosis.  The patient has some mild renal insufficiency with a normal BUN and creatinine and a GFR of 50.  I will discharge to outpatient evaluation and treatment I will have the patient follow up with primary care.  She is unaware of the diabetic diet.  We spent some time talking about it.  I will have her return if she gets worse or if new problems develop.  Her EKG showed no evidence of ischemia or arrhythmia.  She has no cough or shortness of breath.  She does have some dizziness.  Slight disequilibrium.  Exam she has no impairment of coordination and has a normal gait.  I had this patient walk for me.                                  Clinical Impression:  Final diagnoses:  [R53.81] Malaise  [R42] Disequilibrium (Primary)  [I10] Hypertension, unspecified type  [R73.9] Hyperglycemia          ED Disposition Condition    Discharge Stable          ED Prescriptions    None       Follow-up Information    None                [1]   Social History  Tobacco Use    Smoking status: Never    Smokeless tobacco: Never   Substance Use Topics    Alcohol use: No    Drug use: No        Rudy Estrella MD  07/06/25 4797

## 2025-07-09 LAB
OHS QRS DURATION: 94 MS
OHS QTC CALCULATION: 454 MS

## 2025-07-31 LAB
GLUCOSE SERPL-MCNC: 141 MG/DL (ref 70–110)
POCT GLUCOSE: 141 MG/DL (ref 70–110)

## 2025-07-31 PROCEDURE — 93005 ELECTROCARDIOGRAM TRACING: CPT

## 2025-07-31 PROCEDURE — 93010 ELECTROCARDIOGRAM REPORT: CPT | Mod: ,,, | Performed by: INTERNAL MEDICINE

## 2025-07-31 PROCEDURE — 99285 EMERGENCY DEPT VISIT HI MDM: CPT | Mod: 25

## 2025-07-31 PROCEDURE — 82962 GLUCOSE BLOOD TEST: CPT

## 2025-08-01 ENCOUNTER — HOSPITAL ENCOUNTER (INPATIENT)
Facility: HOSPITAL | Age: 85
LOS: 1 days | Discharge: HOME HEALTH | DRG: 641 | End: 2025-08-02
Attending: EMERGENCY MEDICINE | Admitting: STUDENT IN AN ORGANIZED HEALTH CARE EDUCATION/TRAINING PROGRAM
Payer: MEDICARE

## 2025-08-01 DIAGNOSIS — E87.1 HYPONATREMIA: Primary | ICD-10-CM

## 2025-08-01 DIAGNOSIS — R42 DIZZINESS: ICD-10-CM

## 2025-08-01 DIAGNOSIS — R07.9 CHEST PAIN: ICD-10-CM

## 2025-08-01 DIAGNOSIS — E87.6 HYPOKALEMIA: ICD-10-CM

## 2025-08-01 LAB
ABSOLUTE EOSINOPHIL (OHS): 0.03 K/UL
ABSOLUTE EOSINOPHIL (OHS): 0.06 K/UL
ABSOLUTE MONOCYTE (OHS): 0.58 K/UL (ref 0.3–1)
ABSOLUTE MONOCYTE (OHS): 0.7 K/UL (ref 0.3–1)
ABSOLUTE NEUTROPHIL COUNT (OHS): 3.52 K/UL (ref 1.8–7.7)
ABSOLUTE NEUTROPHIL COUNT (OHS): 3.87 K/UL (ref 1.8–7.7)
ALBUMIN SERPL BCP-MCNC: 4 G/DL (ref 3.5–5.2)
ALBUMIN SERPL BCP-MCNC: 4.2 G/DL (ref 3.5–5.2)
ALP SERPL-CCNC: 44 UNIT/L (ref 40–150)
ALP SERPL-CCNC: 47 UNIT/L (ref 40–150)
ALT SERPL W/O P-5'-P-CCNC: 8 UNIT/L (ref 10–44)
ALT SERPL W/O P-5'-P-CCNC: 9 UNIT/L (ref 10–44)
ANION GAP (OHS): 12 MMOL/L (ref 8–16)
ANION GAP (OHS): 9 MMOL/L (ref 8–16)
AST SERPL-CCNC: 23 UNIT/L (ref 11–45)
AST SERPL-CCNC: 25 UNIT/L (ref 11–45)
BASOPHILS # BLD AUTO: 0.05 K/UL
BASOPHILS # BLD AUTO: 0.06 K/UL
BASOPHILS NFR BLD AUTO: 0.8 %
BASOPHILS NFR BLD AUTO: 1 %
BILIRUB SERPL-MCNC: 0.5 MG/DL (ref 0.1–1)
BILIRUB SERPL-MCNC: 0.6 MG/DL (ref 0.1–1)
BILIRUB UR QL STRIP.AUTO: NEGATIVE
BUN SERPL-MCNC: 20 MG/DL (ref 8–23)
BUN SERPL-MCNC: 24 MG/DL (ref 8–23)
CALCIUM SERPL-MCNC: 9.4 MG/DL (ref 8.7–10.5)
CALCIUM SERPL-MCNC: 9.9 MG/DL (ref 8.7–10.5)
CHLORIDE SERPL-SCNC: 88 MMOL/L (ref 95–110)
CHLORIDE SERPL-SCNC: 95 MMOL/L (ref 95–110)
CLARITY UR: CLEAR
CO2 SERPL-SCNC: 24 MMOL/L (ref 23–29)
CO2 SERPL-SCNC: 24 MMOL/L (ref 23–29)
COLOR UR AUTO: COLORLESS
CREAT SERPL-MCNC: 0.8 MG/DL (ref 0.5–1.4)
CREAT SERPL-MCNC: 0.9 MG/DL (ref 0.5–1.4)
ERYTHROCYTE [DISTWIDTH] IN BLOOD BY AUTOMATED COUNT: 12.5 % (ref 11.5–14.5)
ERYTHROCYTE [DISTWIDTH] IN BLOOD BY AUTOMATED COUNT: 12.7 % (ref 11.5–14.5)
GFR SERPLBLD CREATININE-BSD FMLA CKD-EPI: >60 ML/MIN/1.73/M2
GFR SERPLBLD CREATININE-BSD FMLA CKD-EPI: >60 ML/MIN/1.73/M2
GLUCOSE SERPL-MCNC: 130 MG/DL (ref 70–110)
GLUCOSE SERPL-MCNC: 151 MG/DL (ref 70–110)
GLUCOSE SERPL-MCNC: 72 MG/DL (ref 70–110)
GLUCOSE SERPL-MCNC: 77 MG/DL (ref 70–110)
GLUCOSE UR QL STRIP: NEGATIVE
HCT VFR BLD AUTO: 35.7 % (ref 37–48.5)
HCT VFR BLD AUTO: 37.4 % (ref 37–48.5)
HGB BLD-MCNC: 11.9 GM/DL (ref 12–16)
HGB BLD-MCNC: 12.5 GM/DL (ref 12–16)
HGB UR QL STRIP: NEGATIVE
IMM GRANULOCYTES # BLD AUTO: 0.01 K/UL (ref 0–0.04)
IMM GRANULOCYTES # BLD AUTO: 0.02 K/UL (ref 0–0.04)
IMM GRANULOCYTES NFR BLD AUTO: 0.2 % (ref 0–0.5)
IMM GRANULOCYTES NFR BLD AUTO: 0.3 % (ref 0–0.5)
KETONES UR QL STRIP: NEGATIVE
LEUKOCYTE ESTERASE UR QL STRIP: NEGATIVE
LYMPHOCYTES # BLD AUTO: 1.58 K/UL (ref 1–4.8)
LYMPHOCYTES # BLD AUTO: 2.24 K/UL (ref 1–4.8)
MAGNESIUM SERPL-MCNC: 1.6 MG/DL (ref 1.6–2.6)
MAGNESIUM SERPL-MCNC: 1.7 MG/DL (ref 1.6–2.6)
MCH RBC QN AUTO: 27.9 PG (ref 27–31)
MCH RBC QN AUTO: 28 PG (ref 27–31)
MCHC RBC AUTO-ENTMCNC: 33.3 G/DL (ref 32–36)
MCHC RBC AUTO-ENTMCNC: 33.4 G/DL (ref 32–36)
MCV RBC AUTO: 84 FL (ref 82–98)
MCV RBC AUTO: 84 FL (ref 82–98)
NITRITE UR QL STRIP: NEGATIVE
NUCLEATED RBC (/100WBC) (OHS): 0 /100 WBC
NUCLEATED RBC (/100WBC) (OHS): 0 /100 WBC
OHS QRS DURATION: 102 MS
OHS QTC CALCULATION: 411 MS
OSMOLALITY SERPL: 275 MOSM/KG (ref 275–295)
OSMOLALITY UR: 171 MOSM/KG (ref 50–1200)
PH UR STRIP: 6 [PH]
PHOSPHATE SERPL-MCNC: 1.7 MG/DL (ref 2.7–4.5)
PLATELET # BLD AUTO: 265 K/UL (ref 150–450)
PLATELET # BLD AUTO: ABNORMAL 10*3/UL
PMV BLD AUTO: 9.6 FL (ref 9.2–12.9)
PMV BLD AUTO: ABNORMAL FL
POCT GLUCOSE: 119 MG/DL (ref 70–110)
POCT GLUCOSE: 130 MG/DL (ref 70–110)
POCT GLUCOSE: 77 MG/DL (ref 70–110)
POTASSIUM SERPL-SCNC: 2.7 MMOL/L (ref 3.5–5.1)
POTASSIUM SERPL-SCNC: 4 MMOL/L (ref 3.5–5.1)
PROT SERPL-MCNC: 7.5 GM/DL (ref 6–8.4)
PROT SERPL-MCNC: 7.9 GM/DL (ref 6–8.4)
PROT UR QL STRIP: NEGATIVE
RBC # BLD AUTO: 4.27 M/UL (ref 4–5.4)
RBC # BLD AUTO: 4.47 M/UL (ref 4–5.4)
RELATIVE EOSINOPHIL (OHS): 0.5 %
RELATIVE EOSINOPHIL (OHS): 0.9 %
RELATIVE LYMPHOCYTE (OHS): 25.8 % (ref 18–48)
RELATIVE LYMPHOCYTE (OHS): 34 % (ref 18–48)
RELATIVE MONOCYTE (OHS): 10.6 % (ref 4–15)
RELATIVE MONOCYTE (OHS): 9.5 % (ref 4–15)
RELATIVE NEUTROPHIL (OHS): 53.4 % (ref 38–73)
RELATIVE NEUTROPHIL (OHS): 63 % (ref 38–73)
SODIUM SERPL-SCNC: 124 MMOL/L (ref 136–145)
SODIUM SERPL-SCNC: 128 MMOL/L (ref 136–145)
SODIUM UR-SCNC: 15 MMOL/L (ref 20–250)
SP GR UR STRIP: 1
TROPONIN I SERPL HS-MCNC: 4 NG/L
UROBILINOGEN UR STRIP-ACNC: NEGATIVE EU/DL
WBC # BLD AUTO: 6.13 K/UL (ref 3.9–12.7)
WBC # BLD AUTO: 6.59 K/UL (ref 3.9–12.7)

## 2025-08-01 PROCEDURE — 85025 COMPLETE CBC W/AUTO DIFF WBC: CPT | Performed by: EMERGENCY MEDICINE

## 2025-08-01 PROCEDURE — 80053 COMPREHEN METABOLIC PANEL: CPT | Performed by: EMERGENCY MEDICINE

## 2025-08-01 PROCEDURE — 25000003 PHARM REV CODE 250: Performed by: EMERGENCY MEDICINE

## 2025-08-01 PROCEDURE — 25000003 PHARM REV CODE 250: Performed by: INTERNAL MEDICINE

## 2025-08-01 PROCEDURE — 81003 URINALYSIS AUTO W/O SCOPE: CPT | Performed by: EMERGENCY MEDICINE

## 2025-08-01 PROCEDURE — 21400001 HC TELEMETRY ROOM

## 2025-08-01 PROCEDURE — 25000003 PHARM REV CODE 250: Performed by: STUDENT IN AN ORGANIZED HEALTH CARE EDUCATION/TRAINING PROGRAM

## 2025-08-01 PROCEDURE — 83735 ASSAY OF MAGNESIUM: CPT | Performed by: STUDENT IN AN ORGANIZED HEALTH CARE EDUCATION/TRAINING PROGRAM

## 2025-08-01 PROCEDURE — 85025 COMPLETE CBC W/AUTO DIFF WBC: CPT | Performed by: STUDENT IN AN ORGANIZED HEALTH CARE EDUCATION/TRAINING PROGRAM

## 2025-08-01 PROCEDURE — 83930 ASSAY OF BLOOD OSMOLALITY: CPT | Performed by: STUDENT IN AN ORGANIZED HEALTH CARE EDUCATION/TRAINING PROGRAM

## 2025-08-01 PROCEDURE — 84300 ASSAY OF URINE SODIUM: CPT | Performed by: STUDENT IN AN ORGANIZED HEALTH CARE EDUCATION/TRAINING PROGRAM

## 2025-08-01 PROCEDURE — 80053 COMPREHEN METABOLIC PANEL: CPT | Performed by: STUDENT IN AN ORGANIZED HEALTH CARE EDUCATION/TRAINING PROGRAM

## 2025-08-01 PROCEDURE — 83935 ASSAY OF URINE OSMOLALITY: CPT | Performed by: STUDENT IN AN ORGANIZED HEALTH CARE EDUCATION/TRAINING PROGRAM

## 2025-08-01 PROCEDURE — 84100 ASSAY OF PHOSPHORUS: CPT | Performed by: STUDENT IN AN ORGANIZED HEALTH CARE EDUCATION/TRAINING PROGRAM

## 2025-08-01 PROCEDURE — 83735 ASSAY OF MAGNESIUM: CPT | Performed by: EMERGENCY MEDICINE

## 2025-08-01 PROCEDURE — 84484 ASSAY OF TROPONIN QUANT: CPT | Performed by: EMERGENCY MEDICINE

## 2025-08-01 RX ORDER — ACETAMINOPHEN 325 MG/1
650 TABLET ORAL EVERY 8 HOURS PRN
Status: DISCONTINUED | OUTPATIENT
Start: 2025-08-01 | End: 2025-08-02 | Stop reason: HOSPADM

## 2025-08-01 RX ORDER — LATANOPROST 50 UG/ML
1 SOLUTION/ DROPS OPHTHALMIC NIGHTLY
Status: DISCONTINUED | OUTPATIENT
Start: 2025-08-01 | End: 2025-08-02 | Stop reason: HOSPADM

## 2025-08-01 RX ORDER — SODIUM,POTASSIUM PHOSPHATES 280-250MG
2 POWDER IN PACKET (EA) ORAL
Status: DISCONTINUED | OUTPATIENT
Start: 2025-08-01 | End: 2025-08-02 | Stop reason: HOSPADM

## 2025-08-01 RX ORDER — LANOLIN ALCOHOL/MO/W.PET/CERES
800 CREAM (GRAM) TOPICAL
Status: DISCONTINUED | OUTPATIENT
Start: 2025-08-01 | End: 2025-08-02 | Stop reason: HOSPADM

## 2025-08-01 RX ORDER — SODIUM CHLORIDE 0.9 % (FLUSH) 0.9 %
10 SYRINGE (ML) INJECTION EVERY 12 HOURS PRN
Status: DISCONTINUED | OUTPATIENT
Start: 2025-08-01 | End: 2025-08-02 | Stop reason: HOSPADM

## 2025-08-01 RX ORDER — GLUCAGON 1 MG
1 KIT INJECTION
Status: DISCONTINUED | OUTPATIENT
Start: 2025-08-01 | End: 2025-08-02 | Stop reason: HOSPADM

## 2025-08-01 RX ORDER — TRAZODONE HYDROCHLORIDE 50 MG/1
50 TABLET ORAL NIGHTLY
Status: ON HOLD | COMMUNITY
End: 2025-08-02 | Stop reason: HOSPADM

## 2025-08-01 RX ORDER — LOSARTAN POTASSIUM 25 MG/1
100 TABLET ORAL DAILY
Status: DISCONTINUED | OUTPATIENT
Start: 2025-08-01 | End: 2025-08-01

## 2025-08-01 RX ORDER — ATORVASTATIN CALCIUM 10 MG/1
20 TABLET, FILM COATED ORAL DAILY
Status: DISCONTINUED | OUTPATIENT
Start: 2025-08-01 | End: 2025-08-01

## 2025-08-01 RX ORDER — ONDANSETRON HYDROCHLORIDE 2 MG/ML
4 INJECTION, SOLUTION INTRAVENOUS EVERY 8 HOURS PRN
Status: DISCONTINUED | OUTPATIENT
Start: 2025-08-01 | End: 2025-08-02 | Stop reason: HOSPADM

## 2025-08-01 RX ORDER — LANCETS
1 EACH MISCELLANEOUS 2 TIMES DAILY
COMMUNITY
Start: 2025-07-09

## 2025-08-01 RX ORDER — POTASSIUM CHLORIDE 20 MEQ/1
40 TABLET, EXTENDED RELEASE ORAL ONCE
Status: COMPLETED | OUTPATIENT
Start: 2025-08-01 | End: 2025-08-01

## 2025-08-01 RX ORDER — TALC
6 POWDER (GRAM) TOPICAL NIGHTLY PRN
Status: DISCONTINUED | OUTPATIENT
Start: 2025-08-01 | End: 2025-08-02 | Stop reason: HOSPADM

## 2025-08-01 RX ORDER — ENOXAPARIN SODIUM 100 MG/ML
40 INJECTION SUBCUTANEOUS EVERY 24 HOURS
Status: DISCONTINUED | OUTPATIENT
Start: 2025-08-01 | End: 2025-08-02 | Stop reason: HOSPADM

## 2025-08-01 RX ORDER — AMOXICILLIN 250 MG
1 CAPSULE ORAL DAILY PRN
Status: DISCONTINUED | OUTPATIENT
Start: 2025-08-01 | End: 2025-08-02 | Stop reason: HOSPADM

## 2025-08-01 RX ORDER — BLOOD-GLUCOSE METER
EACH MISCELLANEOUS
COMMUNITY
Start: 2025-07-09

## 2025-08-01 RX ORDER — SIMETHICONE 80 MG
1 TABLET,CHEWABLE ORAL 4 TIMES DAILY PRN
Status: DISCONTINUED | OUTPATIENT
Start: 2025-08-01 | End: 2025-08-02 | Stop reason: HOSPADM

## 2025-08-01 RX ORDER — INSULIN PUMP SYRINGE, 3 ML
EACH MISCELLANEOUS 2 TIMES DAILY
COMMUNITY
Start: 2025-07-09

## 2025-08-01 RX ORDER — IBUPROFEN 200 MG
24 TABLET ORAL
Status: DISCONTINUED | OUTPATIENT
Start: 2025-08-01 | End: 2025-08-02 | Stop reason: HOSPADM

## 2025-08-01 RX ORDER — ALUMINUM HYDROXIDE, MAGNESIUM HYDROXIDE, AND SIMETHICONE 1200; 120; 1200 MG/30ML; MG/30ML; MG/30ML
30 SUSPENSION ORAL 4 TIMES DAILY PRN
Status: DISCONTINUED | OUTPATIENT
Start: 2025-08-01 | End: 2025-08-02 | Stop reason: HOSPADM

## 2025-08-01 RX ORDER — IBUPROFEN 200 MG
16 TABLET ORAL
Status: DISCONTINUED | OUTPATIENT
Start: 2025-08-01 | End: 2025-08-02 | Stop reason: HOSPADM

## 2025-08-01 RX ORDER — BISMUTH SUBSALICYLATE 525 MG/30ML
30 LIQUID ORAL EVERY 6 HOURS PRN
Status: DISCONTINUED | OUTPATIENT
Start: 2025-08-01 | End: 2025-08-02 | Stop reason: HOSPADM

## 2025-08-01 RX ORDER — NALOXONE HCL 0.4 MG/ML
0.02 VIAL (ML) INJECTION
Status: DISCONTINUED | OUTPATIENT
Start: 2025-08-01 | End: 2025-08-02 | Stop reason: HOSPADM

## 2025-08-01 RX ORDER — BISACODYL 10 MG/1
10 SUPPOSITORY RECTAL DAILY PRN
Status: DISCONTINUED | OUTPATIENT
Start: 2025-08-01 | End: 2025-08-02 | Stop reason: HOSPADM

## 2025-08-01 RX ORDER — INSULIN ASPART 100 [IU]/ML
0-5 INJECTION, SOLUTION INTRAVENOUS; SUBCUTANEOUS
Status: DISCONTINUED | OUTPATIENT
Start: 2025-08-01 | End: 2025-08-02 | Stop reason: HOSPADM

## 2025-08-01 RX ORDER — ACETAMINOPHEN 325 MG/1
650 TABLET ORAL EVERY 4 HOURS PRN
Status: DISCONTINUED | OUTPATIENT
Start: 2025-08-01 | End: 2025-08-02 | Stop reason: HOSPADM

## 2025-08-01 RX ORDER — ASPIRIN 81 MG/1
81 TABLET ORAL 2 TIMES DAILY WITH MEALS
Status: DISCONTINUED | OUTPATIENT
Start: 2025-08-01 | End: 2025-08-02 | Stop reason: HOSPADM

## 2025-08-01 RX ADMIN — SODIUM PHOSPHATE, MONOBASIC, MONOHYDRATE AND SODIUM PHOSPHATE, DIBASIC, ANHYDROUS 20.1 MMOL: 142; 276 INJECTION, SOLUTION INTRAVENOUS at 08:08

## 2025-08-01 RX ADMIN — POTASSIUM BICARBONATE 60 MEQ: 391 TABLET, EFFERVESCENT ORAL at 02:08

## 2025-08-01 RX ADMIN — ACETAMINOPHEN 650 MG: 325 TABLET ORAL at 06:08

## 2025-08-01 RX ADMIN — SODIUM CHLORIDE 1000 ML: 9 INJECTION, SOLUTION INTRAVENOUS at 02:08

## 2025-08-01 RX ADMIN — Medication 30 ML: at 10:08

## 2025-08-01 RX ADMIN — POTASSIUM CHLORIDE 40 MEQ: 1500 TABLET, EXTENDED RELEASE ORAL at 03:08

## 2025-08-01 RX ADMIN — LATANOPROST 1 DROP: 50 SOLUTION OPHTHALMIC at 10:08

## 2025-08-01 NOTE — H&P
[facility] Medicine  History & Physical    Patient Name: Radha Morales  MRN: [id number]  Patient Class: IP- Inpatient  Admission Date: 8/1/2025  Attending Physician: Matt Vences MD   Primary Care Provider: Adriana Cerda MD         Patient information was obtained from patient, past medical records, and ER records.     Subjective:     Principal Problem:Hyponatremia    Chief Complaint:   Chief Complaint   Patient presents with    Dizziness     For over a month, saw PCP for this already, says sometimes she is nauseous but denies emesis, also has left calf pain but denies injury, Hx of DM, last CBG was this am        HPI: This is an 84-year-old female with a past medical history of hypertension, type 2 diabetes, hyperlipidemia who presents with dizziness.    Patient presents for evaluation of dizziness that started 2 months prior to presentation.  She reports persistent, and recently worsening symptoms.  Her dizziness is most pronounced with movement.  Additional symptoms include leg cramping.  She denies nausea, vomiting, diarrhea or decreased p.o. intake.  She reports compliance with her medications including her home chlorthalidone.    In the ED, the patient was hemodynamically stable.  Labs were remarkable for hyponatremia (124), hypokalemia (2.7).  CT head showed no acute process.  Patient was given 1 L of NS, potassium bicarbonate 60 mEq.  She was admitted for further management.    Past Medical History:   Diagnosis Date    Arthritis     Diabetes mellitus     Hypertension        Past Surgical History:   Procedure Laterality Date    CATARACT EXTRACTION Bilateral     CERVIX REMOVAL N/A 9/16/2019    Procedure: Removal of cervix with culdoplasty ;  Surgeon: Sundar Alfonso MD;  Location: [facility] OR;  Service: OB/GYN;  Laterality: N/A;    COLONOSCOPY N/A 3/31/2017    Procedure: COLONOSCOPY;  Surgeon: Apolinar Matute MD;  Location: [facility] ENDO;  Service: Endoscopy;  Laterality: N/A;     HYSTERECTOMY  1995    OVARY SURGERY      TOTAL KNEE ARTHROPLASTY Right 12/6/2019    Procedure: ARTHROPLASTY, KNEE, TOTAL ESTUARDO;  Surgeon: Douglas Corbin MD;  Location: [facility] OR;  Service: Orthopedics;  Laterality: Right;  SUPINE---NEED H/P---  ESTUARDO COBB [phone] TEXTED HIM @ 7:18AM ON 11-5-19  RN PREOP--11/27/19---has PCP clearance from Dr. Figueroa-moderate risk    TOTAL VAGINAL HYSTERECTOMY  9/16/2019    Procedure: HYSTERECTOMY, TOTAL, VAGINAL;  Surgeon: Sundar Alfonso MD;  Location: [facility] OR;  Service: OB/GYN;;       Review of patient's allergies indicates:  No Known Allergies    No current facility-administered medications on file prior to encounter.     Current Outpatient Medications on File Prior to Encounter   Medication Sig    cefadroxil (DURICEF) 500 MG Cap Take 1 capsule (500 mg total) by mouth every 12 (twelve) hours.    chlorthalidone (HYGROTEN) 25 MG Tab Take 25 mg by mouth once daily.    latanoprost 0.005 % ophthalmic solution INSTILL 1 DROP INTO BOTH EYES QHS    losartan (COZAAR) 100 MG tablet TK 1 T PO D    metformin (GLUCOPHAGE) 1000 MG tablet Take 1,000 mg by mouth 2 (two) times daily with meals.    rosuvastatin (CRESTOR) 5 MG tablet Take 5 mg by mouth once daily.    traZODone (DESYREL) 50 MG tablet Take 50 mg by mouth every evening.    amlodipine-benazepril 10-20mg (LOTREL) 10-20 mg per capsule Take 1 capsule by mouth once daily.    ammonium lactate 12 % Crea     aspirin (ECOTRIN) 81 MG EC tablet Take 1 tablet (81 mg total) by mouth 2 (two) times daily with meals.    estradioL (ESTRACE) 0.01 % (0.1 mg/gram) vaginal cream PLACE 1 GRAM VAGINALLY EVERY DAY    meloxicam (MOBIC) 15 MG tablet     multivit,iron,minerals/lutein (CENTRUM SILVER ULTRA WOMEN'S ORAL) Take by mouth.     Family History       Problem Relation (Age of Onset)    Diabetes Father, Mother          Tobacco Use    Smoking status: Never    Smokeless tobacco: Never   Substance and Sexual Activity    Alcohol use: No    Drug use:  No    Sexual activity: Never     Birth control/protection: None     Review of Systems   Respiratory: Negative.     Cardiovascular: Negative.    Gastrointestinal: Negative.    Genitourinary: Negative.    Musculoskeletal: Negative.    Neurological:  Positive for dizziness.     Objective:     Vital Signs (Most Recent):  Temp: 97.8 °F (36.6 °C) (07/31/25 2347)  Pulse: 74 (08/01/25 0332)  Resp: 17 (08/01/25 0322)  BP: (!) 150/72 (08/01/25 0332)  SpO2: 100 % (08/01/25 0332) Vital Signs (24h Range):  Temp:  [97.8 °F (36.6 °C)] 97.8 °F (36.6 °C)  Pulse:  [63-75] 74  Resp:  [14-17] 17  SpO2:  [98 %-100 %] 100 %  BP: (119-151)/(58-72) 150/72     Weight: 74.8 kg (165 lb)  Body mass index is 27.46 kg/m².     Physical Exam  Vitals and nursing note reviewed.   Constitutional:       General: She is not in acute distress.     Appearance: She is not ill-appearing.   HENT:      Mouth/Throat:      Mouth: Mucous membranes are moist.   Cardiovascular:      Rate and Rhythm: Normal rate.   Pulmonary:      Effort: Pulmonary effort is normal.   Abdominal:      General: Abdomen is flat.   Skin:     General: Skin is warm.   Neurological:      General: No focal deficit present.      Mental Status: She is alert.                Significant Labs: All pertinent labs within the past 24 hours have been reviewed.    Significant Imaging: I have reviewed all pertinent imaging results/findings within the past 24 hours.    Assessment/Plan:     Assessment & Plan  Hyponatremia  Hyponatremia is likely due to Dehydration/hypovolemia, likely related to chlorthalidone. The patient's most recent sodium results are listed below.  Recent Labs     08/01/25  0200   *       Plan  - Correct the sodium by 10-12mEq in 24 hours  - Obtain the following studies: Urine sodium, urine osmolality, serum osmolality  - Will treat the hyponatremia with IV fluids as follows: 100 ml/hr as long as there is no overcorrection   - Monitor sodium Every 6 hours   - Patient  hyponatremia is stable  - discontinue chlorthalidone  Dizziness  Re-evaluate symptoms after correction of electrolytes  Consider MRI brain if persistent.    Essential hypertension  Patient's blood pressure range in the last 24 hours was: BP  Min: 119/60  Max: 151/67.The patient's inpatient anti-hypertensive regimen is listed below:  Current Antihypertensives  losartan tablet 100 mg, Daily, Oral    Plan  - BP is controlled, no changes needed to their regimen  Type 2 diabetes mellitus, controlled  Patient's most recent hemoglobin A1C and blood glucose results are listed below.  A1C  Recent Labs   Lab 06/21/24  0957 10/11/24  1430 06/04/25  0958   Hemoglobin A1C 6.1 H 5.8 H 5.9 H      Fingerstick glucose  Recent Labs     07/31/25  2349   POCTGLUCOSE 141*      Inpatient insulin regimen  insulin aspart U-100 pen 0-5 Units, Before meals & nightly PRN, Subcutaneous    Plan  - Hold oral hypoglycemics while patient is in the hospital  - Current inpatient insulin regimen is listed above  - Patient's glucose is controlled  Hypokalemia  Patient's most recent potassium results are listed below.   Recent Labs     08/01/25  0200   K 2.7*     Plan  - Replete potassium per protocol  - Monitor potassium Daily  - Patient's hypokalemia is stable  VTE Risk Mitigation (From admission, onward)           Ordered     enoxaparin injection 40 mg  Daily         08/01/25 0333     IP VTE HIGH RISK PATIENT  Once         08/01/25 0333     Place sequential compression device  Until discontinued         08/01/25 0333                       Clifton Dominguez MD  Department of Hospital Medicine  [facility] [place] - Emergency Dept

## 2025-08-01 NOTE — ASSESSMENT & PLAN NOTE
Patient's most recent hemoglobin A1C and blood glucose results are listed below.  A1C  Recent Labs   Lab 06/21/24  0957 10/11/24  1430 06/04/25  0958   Hemoglobin A1C 6.1 H 5.8 H 5.9 H      Fingerstick glucose  Recent Labs     07/31/25  2349   POCTGLUCOSE 141*      Inpatient insulin regimen  insulin aspart U-100 pen 0-5 Units, Before meals & nightly PRN, Subcutaneous    Plan  - Hold oral hypoglycemics while patient is in the hospital  - Current inpatient insulin regimen is listed above  - Patient's glucose is controlled

## 2025-08-01 NOTE — ASSESSMENT & PLAN NOTE
Hyponatremia is likely due to Dehydration/hypovolemia, likely related to chlorthalidone. The patient's most recent sodium results are listed below.  Recent Labs     08/01/25  0200   *       Plan  - Correct the sodium by 10-12mEq in 24 hours  - Obtain the following studies: Urine sodium, urine osmolality, serum osmolality  - Will treat the hyponatremia with IV fluids as follows: 100 ml/hr as long as there is no overcorrection   - Monitor sodium Every 6 hours   - Patient hyponatremia is stable  - discontinue chlorthalidone

## 2025-08-01 NOTE — NURSING
[facility] [place]  Nurses Note -- 4 Eyes      8/1/2025       Skin assessed on: Admit      [x] No Pressure Injuries Present    [x]Prevention Measures Documented    [] Yes LDA  for Pressure Injury Previously documented     [] Yes New Pressure Injury Discovered   [] LDA for New Pressure Injury Added      Attending RN:  Floresita Vegas LPN     Second RN:  MANUEL Santiago

## 2025-08-01 NOTE — NURSING
Received report from RUBENS Palacios in ED. Patient arrived to floor via stretcher. Patient ambulated from stretcher to bed with no issues. Patient AAOx4. Patient currently complaining of a muscle cramp in her right leg. Bed in lowest position, wheels locked, call bell in reach, side rails up x2.

## 2025-08-01 NOTE — ASSESSMENT & PLAN NOTE
Patient's blood pressure range in the last 24 hours was: BP  Min: 119/60  Max: 151/67.The patient's inpatient anti-hypertensive regimen is listed below:  Current Antihypertensives  losartan tablet 100 mg, Daily, Oral    Plan  - BP is controlled, no changes needed to their regimen

## 2025-08-01 NOTE — SUBJECTIVE & OBJECTIVE
Past Medical History:   Diagnosis Date    Arthritis     Diabetes mellitus     Hypertension        Past Surgical History:   Procedure Laterality Date    CATARACT EXTRACTION Bilateral     CERVIX REMOVAL N/A 9/16/2019    Procedure: Removal of cervix with culdoplasty ;  Surgeon: Sundar Alfonso MD;  Location: [facility] OR;  Service: OB/GYN;  Laterality: N/A;    COLONOSCOPY N/A 3/31/2017    Procedure: COLONOSCOPY;  Surgeon: Apolinar Matute MD;  Location: [facility] ENDO;  Service: Endoscopy;  Laterality: N/A;    HYSTERECTOMY  1995    OVARY SURGERY      TOTAL KNEE ARTHROPLASTY Right 12/6/2019    Procedure: ARTHROPLASTY, KNEE, TOTAL ESTUARDO;  Surgeon: Douglas Corbni MD;  Location: [facility] OR;  Service: Orthopedics;  Laterality: Right;  SUPINE---NEED H/P---  ESTUARDO COBB [phone] TEXTED HIM @ 7:18AM ON 11-5-19  RN PREOP--11/27/19---has PCP clearance from Dr. Figueroa-moderate risk    TOTAL VAGINAL HYSTERECTOMY  9/16/2019    Procedure: HYSTERECTOMY, TOTAL, VAGINAL;  Surgeon: Sundar Alfonso MD;  Location: [facility] OR;  Service: OB/GYN;;       Review of patient's allergies indicates:  No Known Allergies    No current facility-administered medications on file prior to encounter.     Current Outpatient Medications on File Prior to Encounter   Medication Sig    cefadroxil (DURICEF) 500 MG Cap Take 1 capsule (500 mg total) by mouth every 12 (twelve) hours.    chlorthalidone (HYGROTEN) 25 MG Tab Take 25 mg by mouth once daily.    latanoprost 0.005 % ophthalmic solution INSTILL 1 DROP INTO BOTH EYES QHS    losartan (COZAAR) 100 MG tablet TK 1 T PO D    metformin (GLUCOPHAGE) 1000 MG tablet Take 1,000 mg by mouth 2 (two) times daily with meals.    rosuvastatin (CRESTOR) 5 MG tablet Take 5 mg by mouth once daily.    traZODone (DESYREL) 50 MG tablet Take 50 mg by mouth every evening.    amlodipine-benazepril 10-20mg (LOTREL) 10-20 mg per capsule Take 1 capsule by mouth once daily.    ammonium lactate 12 % Crea     aspirin (ECOTRIN) 81 MG EC tablet  Take 1 tablet (81 mg total) by mouth 2 (two) times daily with meals.    estradioL (ESTRACE) 0.01 % (0.1 mg/gram) vaginal cream PLACE 1 GRAM VAGINALLY EVERY DAY    meloxicam (MOBIC) 15 MG tablet     multivit,iron,minerals/lutein (CENTRUM SILVER ULTRA WOMEN'S ORAL) Take by mouth.     Family History       Problem Relation (Age of Onset)    Diabetes Father, Mother          Tobacco Use    Smoking status: Never    Smokeless tobacco: Never   Substance and Sexual Activity    Alcohol use: No    Drug use: No    Sexual activity: Never     Birth control/protection: None     Review of Systems   Respiratory: Negative.     Cardiovascular: Negative.    Gastrointestinal: Negative.    Genitourinary: Negative.    Musculoskeletal: Negative.    Neurological:  Positive for dizziness.     Objective:     Vital Signs (Most Recent):  Temp: 97.8 °F (36.6 °C) (07/31/25 2347)  Pulse: 74 (08/01/25 0332)  Resp: 17 (08/01/25 0322)  BP: (!) 150/72 (08/01/25 0332)  SpO2: 100 % (08/01/25 0332) Vital Signs (24h Range):  Temp:  [97.8 °F (36.6 °C)] 97.8 °F (36.6 °C)  Pulse:  [63-75] 74  Resp:  [14-17] 17  SpO2:  [98 %-100 %] 100 %  BP: (119-151)/(58-72) 150/72     Weight: 74.8 kg (165 lb)  Body mass index is 27.46 kg/m².     Physical Exam  Vitals and nursing note reviewed.   Constitutional:       General: She is not in acute distress.     Appearance: She is not ill-appearing.   HENT:      Mouth/Throat:      Mouth: Mucous membranes are moist.   Cardiovascular:      Rate and Rhythm: Normal rate.   Pulmonary:      Effort: Pulmonary effort is normal.   Abdominal:      General: Abdomen is flat.   Skin:     General: Skin is warm.   Neurological:      General: No focal deficit present.      Mental Status: She is alert.                Significant Labs: All pertinent labs within the past 24 hours have been reviewed.    Significant Imaging: I have reviewed all pertinent imaging results/findings within the past 24 hours.

## 2025-08-01 NOTE — PHARMACY MED REC
"  08/01/2025 Patient was able to confirm medication at bedside. Patient expressed repeatedly that she believes her cholesterol medication landed her in the hospital. Pt had medication at bedside    Admission Medication History     The home medication history was taken by Ladan Mayo.    You may go to "Admission" then "Reconcile Home Medications" tabs to review and/or act upon these items.     The home medication list has been updated by the Pharmacy department.   Please read ALL comments highlighted in yellow.   Please address this information as you see fit.    Feel free to contact us if you have any questions or require assistance.      The medications listed below were removed from the home medication list. Please reorder if appropriate:  Patient reports no longer taking the following medication(s):  Lotrel 10-20 Mg  Ammonium Lactate 12 % Cream  Duricef 500 Mg  Mobic 15 Mg      Medications listed below were obtained from: Patient/family and Analytic software- [employer]    (Not in a hospital admission)        Ladan Mayo [facility] Medication Access Specialist [phone]                 .          "

## 2025-08-01 NOTE — HPI
This is an 84-year-old female with a past medical history of hypertension, type 2 diabetes, hyperlipidemia who presents with dizziness.    Patient presents for evaluation of dizziness that started 2 months prior to presentation.  She reports persistent, and recently worsening symptoms.  Her dizziness is most pronounced with movement.  Additional symptoms include leg cramping.  She denies nausea, vomiting, diarrhea or decreased p.o. intake.  She reports compliance with her medications including her home chlorthalidone.    In the ED, the patient was hemodynamically stable.  Labs were remarkable for hyponatremia (124), hypokalemia (2.7).  CT head showed no acute process.  Patient was given 1 L of NS, potassium bicarbonate 60 mEq.  She was admitted for further management.

## 2025-08-01 NOTE — CARE UPDATE
Patient seen and examined.  H&P reviewed.  Updates below.    Ms. Calles is an 84-year-old female who was admitted to the hospital with hyponatremia and dizziness.  She was found to have a sodium of 124, potassium of 2.7 and phosphorus of 1.7.  She was given normal saline, potassium and sodium phosphate.  Sodium improving to 128, potassium improved to 4.0 and pending repeat phosphorus.  Orthostatics negative.  Patient reports not using any salt on her food which could be etiology of low-sodium.  She is feeling much better today and dizziness has improved.  Will likely need 1 more day to monitor electrolytes, replace.  Patient in agreement with plan. Continue with current care.    Matt Vences MD  Department of Hospital Medicine  [facility]

## 2025-08-01 NOTE — ED PROVIDER NOTES
Encounter Date: 7/31/2025       History     Chief Complaint   Patient presents with    Dizziness     For over a month, saw PCP for this already, says sometimes she is nauseous but denies emesis, also has left calf pain but denies injury, Hx of DM, last CBG was this am     84-year-old female with a history of diabetes, hypertension presenting with feeling unsteady and lightheaded when attempting to stand up and walk.  Patient notes symptoms have been ongoing for some time, has seen her primary care provider at least a month ago as well as an emergency department visit.  Denies any acute changes tonight.  Notes symptoms have been persistent.  Denies chest pain, shortness of breath, nausea, vomiting, palpitations.  Denies numbness or weakness.  Denies severe headache, changes in vision, changes in hearing.  Reports history of Bell's palsy but is unsure what symptoms she may have had at that time.      Review of patient's allergies indicates:  No Known Allergies  Past Medical History:   Diagnosis Date    Arthritis     Diabetes mellitus     Hypertension      Past Surgical History:   Procedure Laterality Date    CATARACT EXTRACTION Bilateral     CERVIX REMOVAL N/A 9/16/2019    Procedure: Removal of cervix with culdoplasty ;  Surgeon: Sundar Alfonso MD;  Location: [facility] OR;  Service: OB/GYN;  Laterality: N/A;    COLONOSCOPY N/A 3/31/2017    Procedure: COLONOSCOPY;  Surgeon: Apolinar Matute MD;  Location: [facility] ENDO;  Service: Endoscopy;  Laterality: N/A;    HYSTERECTOMY  1995    OVARY SURGERY      TOTAL KNEE ARTHROPLASTY Right 12/6/2019    Procedure: ARTHROPLASTY, KNEE, TOTAL ESTUARDO;  Surgeon: Douglas Corbin MD;  Location: [facility] OR;  Service: Orthopedics;  Laterality: Right;  SUPINE---NEED H/P---  ESTUARDO COBB [phone] TEXTED HIM @ 7:18AM ON 11-5-19  RN PREOP--11/27/19---has PCP clearance from Dr. Figueroa-moderate risk    TOTAL VAGINAL HYSTERECTOMY  9/16/2019    Procedure: HYSTERECTOMY, TOTAL, VAGINAL;  Surgeon: Sundar  AARON Alfonso MD;  Location: [facility] OR;  Service: OB/GYN;;     Family History   Problem Relation Name Age of Onset    Diabetes Father      Diabetes Mother       Social History[1]  Review of Systems   Constitutional:  Negative for chills and fever.   Respiratory:  Negative for chest tightness and shortness of breath.    Gastrointestinal:  Negative for abdominal pain.   Neurological:  Positive for dizziness and light-headedness. Negative for tremors, syncope, facial asymmetry, speech difficulty, weakness, numbness and headaches.       Physical Exam     Initial Vitals [07/31/25 2347]   BP Pulse Resp Temp SpO2   (!) 140/65 73 14 97.8 °F (36.6 °C) 98 %      MAP       --         Physical Exam    Nursing note and vitals reviewed.  Constitutional: She appears well-developed and well-nourished. She is not diaphoretic. No distress.   HENT:   Head: Normocephalic and atraumatic.   Nose: Nose normal.   Eyes: EOM are normal. Pupils are equal, round, and reactive to light. No scleral icterus.   Neck: Neck supple.   Normal range of motion.  Cardiovascular:  Normal rate, regular rhythm, normal heart sounds and intact distal pulses.     Exam reveals no gallop and no friction rub.       No murmur heard.  Pulmonary/Chest: Breath sounds normal. No stridor. No respiratory distress. She has no wheezes. She has no rhonchi. She has no rales.   Abdominal: Abdomen is soft. Bowel sounds are normal. She exhibits no distension. There is no abdominal tenderness. There is no rebound and no guarding.   Musculoskeletal:         General: No tenderness or edema. Normal range of motion.      Cervical back: Normal range of motion and neck supple.     Neurological: She is alert and oriented to person, place, and time. A cranial nerve deficit is present. No sensory deficit. GCS score is 15. GCS eye subscore is 4. GCS verbal subscore is 5. GCS motor subscore is 6.   Left-sided facial droop compared to right, moving all muscles on command but with baseline  deficit that does not spare the forehead   Skin: Skin is warm and dry. No rash noted.   Psychiatric: She has a normal mood and affect. Her behavior is normal.         ED Course   Procedures  Labs Reviewed   COMPREHENSIVE METABOLIC PANEL - Abnormal       Result Value    Sodium 124 (*)     Potassium 2.7 (*)     Chloride 88 (*)     CO2 24      Glucose 151 (*)     BUN 24 (*)     Creatinine 0.9      Calcium 9.9      Protein Total 7.9      Albumin 4.2      Bilirubin Total 0.6      ALP 47      AST 25      ALT 9 (*)     Anion Gap 12      eGFR >60     POCT GLUCOSE - Abnormal    POCT Glucose 141 (*)    TROPONIN I HIGH SENSITIVITY - Normal    Troponin High Sensitive 4     CBC WITH DIFFERENTIAL - Normal    WBC 6.13      RBC 4.47      HGB 12.5      HCT 37.4      MCV 84      MCH 28.0      MCHC 33.4      RDW 12.7      Platelet Count 265      MPV 9.6      Nucleated RBC 0      Neut % 63.0      Lymph % 25.8      Mono % 9.5      Eos % 0.5      Basophil % 1.0      Imm Grans % 0.2      Neut # 3.87      Lymph # 1.58      Mono # 0.58      Eos # 0.03      Baso # 0.06      Imm Grans # 0.01     CBC W/ AUTO DIFFERENTIAL    Narrative:     The following orders were created for panel order CBC auto differential.  Procedure                               Abnormality         Status                     ---------                               -----------         ------                     CBC with Differential[[id number]]       Normal              Final result                 Please view results for these tests on the individual orders.   URINALYSIS, REFLEX TO URINE CULTURE   MAGNESIUM   POCT GLUCOSE MONITORING CONTINUOUS     EKG Readings: (Independently Interpreted)   Initial Reading: No STEMI. Rhythm: Normal Sinus Rhythm. Heart Rate: 76. Ectopy: PACs. Clinical Impression: AV Block - 1st Degree   No ST segment elevation or depression concerning for acute ischemia.          Imaging Results              CT Head Without Contrast (Final result)  Result  time 08/01/25 02:29:03      Final result by Masoud Rendon MD (08/01/25 02:29:03)                   Impression:      No acute intracranial abnormalities.    Senescent changes, similar to prior.      Electronically signed by: Masoud Rendon MD  Date:    08/01/2025  Time:    02:29               Narrative:    EXAMINATION:  CT HEAD WITHOUT CONTRAST    CLINICAL HISTORY:  Stroke, follow up;Neuro deficit, acute, stroke suspected;    TECHNIQUE:  Low dose axial images were obtained through the head.  Coronal and sagittal reformations were also performed. Contrast was not administered.    COMPARISON:  06/13/2018.    FINDINGS:  The brain parenchyma appears similar to prior.  There is no evidence of acute infarct, hemorrhage, or mass.  There is ventricular and sulcal enlargement consistent with generalized atrophy.  Mild confluent decreased supratentorial white matter attenuation most likely related to chronic nonspecific small vessel disease.   No mass-effect or midline shift.  There are no abnormal extra-axial fluid collections.  The paranasal sinuses and mastoid air cells are essentially clear .  The calvarium appears intact.  .                                       Medications   potassium bicarbonate disintegrating tablet 60 mEq (has no administration in time range)   sodium chloride 0.9% bolus 1,000 mL 1,000 mL (has no administration in time range)     Medical Decision Making  Amount and/or Complexity of Data Reviewed  Labs: ordered.  Radiology: ordered.                           Medical Decision Making:   Initial Assessment:   84-year-old female presenting with dizziness or lightheadedness with walking.  Symptoms are not acute, have been ongoing for at least a month if not more.  Has been seen by her primary care provider in the past.  On exam she is well-appearing and in no acute distress.  Vitals normal.  Notable left-sided facial asymmetry, however does not spare the forehead.  History of Bell's palsy though  prior exam on chart reviewed did not reveal facial asymmetry at rest.  No pronator drift, speech finding difficulty.  I suspect lightheadedness maybe multifactorial, orthostatic or otherwise.  My suspicion for acute neurologic cause is low, however no prior documentation of Bell's palsy with facial asymmetry was noted though there was noted prior paresthesia on the left side.  Patient denies any changes in her appearance.  Will get CT of the head, symptoms of stroke would be subacute or chronic, symptoms started at least a month ago.  Will get labs to include troponin though my suspicion for cardiac cause is less.  No evidence of significant arrhythmia.   ED Management:  84-year-old female is presenting with generalized weakness, lightheadedness and dizziness that is chronic but has been worsening over the last few days.  Has been seen in the emergency department as well as by primary care.  Notes symptoms are always worse immediately after standing for any time attempting to walk.  History of Bell's palsy.  I note some left-sided facial asymmetry though no other focal neurologic deficits.  They asymmetry does not spare the forehead.  She notes Ceballos's palsy was on the left side as well.  Symptoms ongoing for at least a month, doubt acute stroke.  CT head negative.  Lab workup notable for hypo natremia, sodium 124 as well as hypokalemia, potassium 2.7.  She has no prior history of these abnormalities.  I am repleting her potassium.  I have given her IV fluids.  Given symptoms, electrolyte abnormalities, I would like to bring her into the hospital for further evaluation and treatment.  Further evaluation by Neurology may be indicated given on going dizziness.  No evidence of ataxia or nystagmus on exam.                Clinical Impression:  Final diagnoses:  [R42] Dizziness  [R07.9] Chest pain                       [1]   Social History  Tobacco Use    Smoking status: Never    Smokeless tobacco: Never   Substance Use  Topics    Alcohol use: No    Drug use: No        Ihsan King MD  08/01/25 0239

## 2025-08-02 VITALS
BODY MASS INDEX: 28.32 KG/M2 | WEIGHT: 170 LBS | OXYGEN SATURATION: 97 % | TEMPERATURE: 98 F | SYSTOLIC BLOOD PRESSURE: 113 MMHG | HEIGHT: 65 IN | HEART RATE: 66 BPM | RESPIRATION RATE: 16 BRPM | DIASTOLIC BLOOD PRESSURE: 68 MMHG

## 2025-08-02 LAB
ANION GAP (OHS): 13 MMOL/L (ref 8–16)
BUN SERPL-MCNC: 15 MG/DL (ref 8–23)
CALCIUM SERPL-MCNC: 8.9 MG/DL (ref 8.7–10.5)
CHLORIDE SERPL-SCNC: 102 MMOL/L (ref 95–110)
CO2 SERPL-SCNC: 29 MMOL/L (ref 23–29)
CREAT SERPL-MCNC: 0.9 MG/DL (ref 0.5–1.4)
GFR SERPLBLD CREATININE-BSD FMLA CKD-EPI: >60 ML/MIN/1.73/M2
GLUCOSE SERPL-MCNC: 93 MG/DL (ref 70–110)
HOLD SPECIMEN: NORMAL
POCT GLUCOSE: 104 MG/DL (ref 70–110)
POCT GLUCOSE: 119 MG/DL (ref 70–110)
POCT GLUCOSE: 82 MG/DL (ref 70–110)
POTASSIUM SERPL-SCNC: 3.3 MMOL/L (ref 3.5–5.1)
SODIUM SERPL-SCNC: 144 MMOL/L (ref 136–145)

## 2025-08-02 PROCEDURE — 94761 N-INVAS EAR/PLS OXIMETRY MLT: CPT

## 2025-08-02 PROCEDURE — 25000003 PHARM REV CODE 250: Performed by: STUDENT IN AN ORGANIZED HEALTH CARE EDUCATION/TRAINING PROGRAM

## 2025-08-02 PROCEDURE — 82310 ASSAY OF CALCIUM: CPT | Performed by: STUDENT IN AN ORGANIZED HEALTH CARE EDUCATION/TRAINING PROGRAM

## 2025-08-02 PROCEDURE — 36415 COLL VENOUS BLD VENIPUNCTURE: CPT | Performed by: STUDENT IN AN ORGANIZED HEALTH CARE EDUCATION/TRAINING PROGRAM

## 2025-08-02 RX ORDER — LOSARTAN POTASSIUM 100 MG/1
100 TABLET ORAL DAILY
Qty: 90 TABLET | Refills: 3 | Status: SHIPPED | OUTPATIENT
Start: 2025-08-02 | End: 2026-08-02

## 2025-08-02 RX ADMIN — ASPIRIN 81 MG: 81 TABLET, DELAYED RELEASE ORAL at 07:08

## 2025-08-02 RX ADMIN — POTASSIUM BICARBONATE 25 MEQ: 977.5 TABLET, EFFERVESCENT ORAL at 10:08

## 2025-08-02 NOTE — NURSING
Patient ambulated in room with walker with no issues. Discharge orders placed. IV removed and tele removed. Patient ready for discharge teaching. Patient AAOx4. Patient did not complain of any pain throughout shift. Patient not showing signs of distress. Bed in lowest position, wheels locked, call bell in reach, side rails up x2.

## 2025-08-02 NOTE — NURSING
[facility]  Nurses Note -- 4 Eyes      8/1/2025       Skin assessed on: Q Shift      [x] No Pressure Injuries Present    []Prevention Measures Documented    [] Yes LDA  for Pressure Injury Previously documented     [] Yes New Pressure Injury Discovered   [] LDA for New Pressure Injury Added      Attending RN:  lEvie Chisholm, RN     Second RN:  Floresita Vegas LPN

## 2025-08-02 NOTE — ASSESSMENT & PLAN NOTE
Patient's most recent potassium results are listed below.   Recent Labs     08/01/25  0200 08/01/25  0527 08/02/25  0831   K 2.7* 4.0 3.3*     Plan  - Replete potassium per protocol  - Monitor potassium Daily  - Patient's hypokalemia is stable

## 2025-08-02 NOTE — DISCHARGE SUMMARY
[facility]  Discharge Summary      Patient Name: Radha Morales  MRN: [id number]  [facility]: [id number]  Patient Class: IP- Inpatient  Admission Date: 8/1/2025  Hospital Length of Stay: 1 days  Discharge Date and Time: 8/2/2025  4:09 PM  Attending Physician: No att. providers found   Discharging Provider: Matt Vences MD  Primary Care Provider: Adriana Cerda MD    Primary Care Team: Networked reference to record PCT     HPI:   This is an 84-year-old female with a past medical history of hypertension, type 2 diabetes, hyperlipidemia who presents with dizziness.    Patient presents for evaluation of dizziness that started 2 months prior to presentation.  She reports persistent, and recently worsening symptoms.  Her dizziness is most pronounced with movement.  Additional symptoms include leg cramping.  She denies nausea, vomiting, diarrhea or decreased p.o. intake.  She reports compliance with her medications including her home chlorthalidone.    In the ED, the patient was hemodynamically stable.  Labs were remarkable for hyponatremia (124), hypokalemia (2.7).  CT head showed no acute process.  Patient was given 1 L of NS, potassium bicarbonate 60 mEq.  She was admitted for further management.    * No surgery found *      Hospital Course:   Ms. Morales is a 85 yo F who was admitted to the hospital for weakness and found to have hyponatremia. She is on chlorthalidone at home. Na slowly improved during hospital stay. She is feeling much better and ambulating more steady. We reviewed home medications closely on day of discharged and explained which medications to hold. Discussed as well with discharge nurse. Home health orders placed. Discussed with case management, will need PCP follow up. Overall, patient feeling much better and stable for discharge home.      Goals of Care Treatment Preferences:  Code Status: Full Code         Consults:     Assessment & Plan  Hyponatremia  Hyponatremia is  likely due to Dehydration/hypovolemia, likely related to chlorthalidone. The patient's most recent sodium results are listed below.  Recent Labs     08/01/25  0200 08/01/25  0527 08/02/25  0831   * 128* 144       Plan  - Correct the sodium by 10-12mEq in 24 hours  - Obtain the following studies: Urine sodium, urine osmolality, serum osmolality  - Will treat the hyponatremia with IV fluids as follows: 100 ml/hr as long as there is no overcorrection   - Monitor sodium Every 6 hours   - Patient hyponatremia is stable  - discontinue chlorthalidone  Dizziness  Re-evaluate symptoms after correction of electrolytes  Now resolved. Suspect dehydration/hyponatremia    Essential hypertension  Patient's blood pressure range in the last 24 hours was: BP  Min: 105/66  Max: 121/76.The patient's inpatient anti-hypertensive regimen is listed below:  Current Antihypertensives  losartan (COZAAR) tablet, Daily, Oral    Plan  - BP is controlled, no changes needed to their regimen  Type 2 diabetes mellitus, controlled  Patient's most recent hemoglobin A1C and blood glucose results are listed below.  A1C  Recent Labs   Lab 06/21/24  0957 10/11/24  1430 06/04/25  0958   Hemoglobin A1C 6.1 H 5.8 H 5.9 H      Fingerstick glucose  Recent Labs     08/01/25  1828 08/01/25  1945 08/02/25  0730 08/02/25  1111   POCTGLUCOSE 119* 119* 82 104      Inpatient insulin regimen  insulin aspart U-100 pen 0-5 Units, Before meals & nightly PRN, Subcutaneous    Plan  - Hold oral hypoglycemics while patient is in the hospital  - Current inpatient insulin regimen is listed above  - Patient's glucose is controlled  Hypokalemia  Patient's most recent potassium results are listed below.   Recent Labs     08/01/25  0200 08/01/25  0527 08/02/25  0831   K 2.7* 4.0 3.3*     Plan  - Replete potassium per protocol  - Monitor potassium Daily  - Patient's hypokalemia is stable  Final Active Diagnoses:    Diagnosis Date Noted POA    PRINCIPAL PROBLEM:  Hyponatremia  [E87.1] 08/01/2025 Yes    Hypokalemia [E87.6] 08/01/2025 Yes    Type 2 diabetes mellitus, controlled [E11.9] 06/29/2016 Yes     Chronic    Essential hypertension [I10] 06/29/2016 Yes     Chronic    Dizziness [R42] 06/29/2016 Yes      Problems Resolved During this Admission:       Discharged Condition: stable    Disposition: Home or Self Care    Follow Up:   Contact information for follow-up providers       Adriana Cerda MD Follow up.    Specialty: Internal Medicine  Why: Hospital Follow up needed in 1 week, Outpatient follow up  Contact information:  [address]  [facility] [address]  [phone]               [facility] Follow up.    Contact information:  [address] [place] [address]                     Contact information for after-discharge care       Home Medical Care       [facility] .    Service: Home Health Services  Contact information:  [address]  [facility] [address]  [phone]                                 Patient Instructions:      Diet diabetic     Notify your health care provider if you experience any of the following:  temperature >100.4     Notify your health care provider if you experience any of the following:  persistent nausea and vomiting or diarrhea     Notify your health care provider if you experience any of the following:  severe uncontrolled pain     Notify your health care provider if you experience any of the following:  difficulty breathing or increased cough     Notify your health care provider if you experience any of the following:  severe persistent headache     Notify your health care provider if you experience any of the following:  worsening rash     Notify your health care provider if you experience any of the following:  persistent dizziness, light-headedness, or visual disturbances     Notify your health care provider if you experience any of the following:  increased  confusion or weakness     Activity as tolerated       Significant Diagnostic Studies: Labs: All labs within the past 24 hours have been reviewed    Pending Diagnostic Studies:       None           Medications:  Reconciled Home Medications:      Medication List        PAUSE taking these medications      rosuvastatin 5 MG tablet  Wait to take this until your doctor or other care provider tells you to start again.  Commonly known as: CRESTOR  Take 5 mg by mouth once daily.            CHANGE how you take these medications      losartan 100 MG tablet  Commonly known as: COZAAR  Take 1 tablet (100 mg total) by mouth once daily.  What changed: See the new instructions.            CONTINUE taking these medications      * ACCU-CHEK GUIDE GLUCOSE METER Misc  Generic drug: blood-glucose meter  SMARTSIG:As Directed     * blood-glucose meter kit  2 (two) times a day.     aspirin 81 MG EC tablet  Commonly known as: ECOTRIN  Take 1 tablet (81 mg total) by mouth 2 (two) times daily with meals.     blood sugar diagnostic Strp  1 strip by Other route 2 (two) times a day.     CENTRUM SILVER ULTRA WOMEN'S ORAL  Take by mouth.     estradioL 0.01 % (0.1 mg/gram) vaginal cream  Commonly known as: ESTRACE  PLACE 1 GRAM VAGINALLY EVERY DAY     lancets Misc  1 each by Other route 2 (two) times a day.     latanoprost 0.005 % ophthalmic solution  INSTILL 1 DROP INTO BOTH EYES QHS     metFORMIN 1000 MG tablet  Commonly known as: GLUCOPHAGE  Take 1,000 mg by mouth 2 (two) times daily with meals.           * This list has 2 medication(s) that are the same as other medications prescribed for you. Read the directions carefully, and ask your doctor or other care provider to review them with you.                STOP taking these medications      amlodipine-benazepril 10-20mg 10-20 mg per capsule  Commonly known as: LOTREL     ammonium lactate 12 % Crea     cefadroxil 500 MG Cap  Commonly known as: DURICEF     chlorthalidone 25 MG Tab  Commonly known  as: HYGROTEN     meloxicam 15 MG tablet  Commonly known as: MOBIC     traZODone 50 MG tablet  Commonly known as: DESYREL              Indwelling Lines/Drains at time of discharge:   Lines/Drains/Airways       None                       Time spent on the discharge of patient: >35 minutes         Matt Vences MD  Department of Hospital Medicine  [facility] S[facility]

## 2025-08-02 NOTE — PLAN OF CARE
Problem: Hospitalized Older Adult  Goal: Optimal Cognitive Function  Outcome: Met  Goal: Effective Bowel Elimination  Outcome: Met  Goal: Optimal Coping  Outcome: Met  Goal: Fluid and Electrolyte Balance  Outcome: Met  Goal: Optimal Functional Ability  Outcome: Met  Goal: Improved Oral Intake  Outcome: Met  Goal: Adequate Sleep/Rest  Outcome: Met  Goal: Effective Urinary Elimination  Outcome: Met     Problem: Adult Inpatient Plan of Care  Goal: Plan of Care Review  Outcome: Met  Goal: Patient-Specific Goal (Individualized)  Outcome: Met  Goal: Absence of Hospital-Acquired Illness or Injury  Outcome: Met  Goal: Optimal Comfort and Wellbeing  Outcome: Met  Goal: Readiness for Transition of Care  Outcome: Met     Problem: Infection  Goal: Absence of Infection Signs and Symptoms  Outcome: Met     Problem: Diabetes Comorbidity  Goal: Blood Glucose Level Within Targeted Range  Outcome: Met     Problem: Fall Injury Risk  Goal: Absence of Fall and Fall-Related Injury  Outcome: Met     Problem: Comorbidity Management  Goal: Blood Pressure in Desired Range  Outcome: Met

## 2025-08-02 NOTE — PLAN OF CARE
Case Management Assessment     PCP: Dr. Figueroa   Ph[facility]     Patient Arrived From: Home  Existing Help at Home: Independent [phone]    Barriers to Discharge: None    Discharge Plan:    A. Home Health    B. Home Health     Pt lives alone, independent with ADLs; friend or family will assists as needed; HME: rolling walker; Pt states she would like to get established with new PCP in [place] closer to home since she relies on friends/family, public or insurance for transportation; states she will call her insurance to establish with new PCP once she gets back home.     08/02/25 1025   Discharge Assessment   Assessment Type Discharge Planning Assessment   Confirmed/corrected address, phone number and insurance Yes   Confirmed Demographics Correct on Facesheet   Source of Information patient   People in Home alone   Do you expect to return to your current living situation? Yes   Do you have help at home or someone to help you manage your care at home? No   Prior to hospitilization cognitive status: Alert/Oriented   Current cognitive status: Alert/Oriented   Walking or Climbing Stairs Difficulty yes   Walking or Climbing Stairs ambulation difficulty, requires equipment   Dressing/Bathing Difficulty no   Equipment Currently Used at Home walker, rolling   Readmission within 30 days? No   Patient currently being followed by outpatient case management? No   Do you currently have service(s) that help you manage your care at home? No   Do you take prescription medications? Yes   Do you have prescription coverage? Yes   Do you have any problems affording any of your prescribed medications? No   Is the patient taking medications as prescribed? yes   Who is going to help you get home at discharge? friend   How do you get to doctors appointments? family or friend will provide;health plan transportation;public transportation   Are you on dialysis? No   Do you take coumadin? No   Discharge Plan A Home  Health   Discharge Plan B Home Health   DME Needed Upon Discharge  none   Discharge Plan discussed with: Patient   Transition of Care Barriers None

## 2025-08-02 NOTE — ASSESSMENT & PLAN NOTE
Patient's blood pressure range in the last 24 hours was: BP  Min: 105/66  Max: 121/76.The patient's inpatient anti-hypertensive regimen is listed below:  Current Antihypertensives  losartan (COZAAR) tablet, Daily, Oral    Plan  - BP is controlled, no changes needed to their regimen

## 2025-08-02 NOTE — HOSPITAL COURSE
Ms. Morales is a 85 yo F who was admitted to the hospital for weakness and found to have hyponatremia. She is on chlorthalidone at home. Na slowly improved during hospital stay. She is feeling much better and ambulating more steady. We reviewed home medications closely on day of discharged and explained which medications to hold. Discussed as well with discharge nurse. Home health orders placed. Discussed with case management, will need PCP follow up. Overall, patient feeling much better and stable for discharge home.

## 2025-08-02 NOTE — ASSESSMENT & PLAN NOTE
Hyponatremia is likely due to Dehydration/hypovolemia, likely related to chlorthalidone. The patient's most recent sodium results are listed below.  Recent Labs     08/01/25  0200 08/01/25  0527 08/02/25  0831   * 128* 144       Plan  - Correct the sodium by 10-12mEq in 24 hours  - Obtain the following studies: Urine sodium, urine osmolality, serum osmolality  - Will treat the hyponatremia with IV fluids as follows: 100 ml/hr as long as there is no overcorrection   - Monitor sodium Every 6 hours   - Patient hyponatremia is stable  - discontinue chlorthalidone

## 2025-08-02 NOTE — PLAN OF CARE
Patient is awake alert oriented, answers appropriately; [place]. Ambulating to the BR with standby assist with walker. No complaints of dizziness, weakness, SOB nor CP.  Free of falls. Bed alarm on, call light in reach, instructed to call for needs. Continue with plan of care as ordered. No distress noted.   Problem: Hospitalized Older Adult  Goal: Effective Bowel Elimination  Outcome: Progressing  Goal: Optimal Coping  Outcome: Progressing  Goal: Optimal Functional Ability  Outcome: Progressing  Goal: Adequate Sleep/Rest  Outcome: Progressing  Goal: Effective Urinary Elimination  Outcome: Progressing     Problem: Adult Inpatient Plan of Care  Goal: Plan of Care Review  Outcome: Progressing     Problem: Diabetes Comorbidity  Goal: Blood Glucose Level Within Targeted Range  Outcome: Progressing     Problem: Fall Injury Risk  Goal: Absence of Fall and Fall-Related Injury  Outcome: Progressing

## 2025-08-02 NOTE — PROGRESS NOTES
AVS virtually reviewed with patient in its entirety with emphasis on diagnosis, diet and lifestyle modifications, medications, follow-up appointments and reasons to return to the ED. Patient also encouraged to utilize their patient portal. Ease and convenience of use reiterated. Education complete and patient voiced understanding with teach back method. All questions answered. Discharge teaching complete. Care Plan and Education templates resolved. Asked bedside nurse to review medications in person with patient at the bedside to verify she understood which medications to take and which ones to stop

## 2025-08-02 NOTE — NURSING
[facility], [facility]  Nurses Note -- 4 Eyes      8/2/2025       Skin assessed on: Q Shift      [x] No Pressure Injuries Present    [x]Prevention Measures Documented    [] Yes LDA  for Pressure Injury Previously documented     [] Yes New Pressure Injury Discovered   [] LDA for New Pressure Injury Added      Attending RN:  Floresita Vegas LPN     Second RN:  RUBENS Bermeo

## 2025-08-02 NOTE — PLAN OF CARE
[facility] [place] [facility]      HOME HEALTH ORDERS  FACE TO FACE ENCOUNTER    Patient Name: Radha Morales  YOB: 1940    PCP: Adriana Cerda MD   PCP Address: [address] [place] [id number]  PCP Phone Number: [phone]  PCP Fax: [phone]    Encounter Date: 7/31/25    Admit to Home Health    Diagnoses:  Active Hospital Problems    Diagnosis  POA    *Hyponatremia [E87.1]  Unknown    Hypokalemia [E87.6]  Unknown    Type 2 diabetes mellitus, controlled [E11.9]  Yes     Chronic    Essential hypertension [I10]  Yes     Chronic    Dizziness [R42]  Yes      Resolved Hospital Problems   No resolved problems to display.       Follow Up Appointments:  No future appointments.    Allergies:Review of patient's allergies indicates:  No Known Allergies    Medications: Review discharge medications with patient and family and provide education.      I have seen and examined this patient within the last 30 days. My clinical findings that support the need for the home health skilled services and home bound status are the following:no   Weakness/numbness causing balance and gait disturbance due to Weakness/Debility making it taxing to leave home.     Diet:   diabetic diet 2000 calorie    Labs:    Referrals/ Consults  Physical Therapy to evaluate and treat. Evaluate for home safety and equipment needs; Establish/upgrade home exercise program. Perform / instruct on therapeutic exercises, gait training, transfer training, and Range of Motion.  Occupational Therapy to evaluate and treat. Evaluate home environment for safety and equipment needs. Perform/Instruct on transfers, ADL training, ROM, and therapeutic exercises.   to evaluate for community resources/long-range planning.  Aide to provide assistance with personal care, ADLs, and vital signs.    Activities:   activity as tolerated    Nursing:   Agency to admit patient within 24 hours of hospital discharge unless specified on  physician order or at patient request    SN to complete comprehensive assessment including routine vital signs. Instruct on disease process and s/s of complications to report to MD. Review/verify medication list sent home with the patient at time of discharge  and instruct patient/caregiver as needed. Frequency may be adjusted depending on start of care date.     Skilled nurse to perform up to 3 visits PRN for symptoms related to diagnosis    Notify MD if SBP > 160 or < 90; DBP > 90 or < 50; HR > 120 or < 50; Temp > 101; O2 < 88%; Other:       Ok to schedule additional visits based on staff availability and patient request on consecutive days within the home health episode.    Miscellaneous       Home Health Aide:  Nursing , Physical Therapy , Occupational Therapy , Medical Social Work , and Home Health Aide     Wound Care Orders  no    I certify that this patient is confined to her home and needs intermittent skilled nursing care, physical therapy, and occupational therapy.

## 2025-08-02 NOTE — PLAN OF CARE
I provided the patient a choice of post acute providers and offered a list of CMS rated home health services.   Patient has declined to select a preferred provider and elects placement with the first accepting in network provider that is available to provide services as ordered by the physician.     Informed patient and family that placement is based on medical acceptance, insurance authorization and staff availability.

## 2025-08-02 NOTE — ASSESSMENT & PLAN NOTE
Patient's most recent hemoglobin A1C and blood glucose results are listed below.  A1C  Recent Labs   Lab 06/21/24  0957 10/11/24  1430 06/04/25  0958   Hemoglobin A1C 6.1 H 5.8 H 5.9 H      Fingerstick glucose  Recent Labs     08/01/25  1828 08/01/25  1945 08/02/25  0730 08/02/25  1111   POCTGLUCOSE 119* 119* 82 104      Inpatient insulin regimen  insulin aspart U-100 pen 0-5 Units, Before meals & nightly PRN, Subcutaneous    Plan  - Hold oral hypoglycemics while patient is in the hospital  - Current inpatient insulin regimen is listed above  - Patient's glucose is controlled

## 2025-08-02 NOTE — DISCHARGE INSTRUCTIONS
Our goal at [facility] is to always give you outstanding care and exceptional service. You may receive a survey from [employer] by mail, text or e-mail in the next 24-48 hours asking about the care you received with us. The survey should only take 5-10 minutes to complete and is very important to us.     Your feedback provides us with a way to recognize our staff who work tirelessly to provide the best care! Also, your responses help us learn how to improve when your experience was below our aspiration of excellence. We are always looking for ways to improve your stay. We WILL use your feedback to continue making improvements to help us provide the highest quality care. We keep your personal information and feedback confidential. We appreciate your time completing this survey and can't wait to hear from you!!!    We look forward to your continued care with us! Thanks so much for choosing [facility] for your healthcare needs!

## 2025-08-02 NOTE — NURSING
Added clinical references and plan of care   08/01/25 1940   Admission Complete   Admission Complete by VN Complete

## 2025-08-02 NOTE — ASSESSMENT & PLAN NOTE
Re-evaluate symptoms after correction of electrolytes  Now resolved. Suspect dehydration/hyponatremia

## 2025-08-02 NOTE — PLAN OF CARE
Case Management Final Discharge Note    Discharge Disposition: Home Health- [facility]    New DME ordered / company name: None    Relevant SDOH / Transition of Care Barriers:  None    Person available to provide assistance at home when needed and their contact information: Independent; friend/family assists as needed.     Scheduled followup appointment: Pt. To schedule PCP follow-up, added to AVS    Referrals placed: None    Transportation: Private Vehicle, Friend     Patient and family educated on discharge services and updated on DC plan. Bedside RN notified, patient clear to discharge from Case Management Perspective.     08/02/25 1230   Final Note   Assessment Type Final Discharge Note   Anticipated Discharge Disposition Home-Health   What phone number can be called within the next 1-3 days to see how you are doing after discharge? [phone]   Hospital Resources/Appts/Education Provided Post-Acute resouces added to AVS   Post-Acute Status   Post-Acute Authorization Home Health   Home Health Status Set-up Complete/Auth obtained  ([facility])   Discharge Delays None known at this time

## 2025-08-05 ENCOUNTER — TELEPHONE (OUTPATIENT)
Dept: HOME HEALTH SERVICES | Facility: CLINIC | Age: 85
End: 2025-08-05
Payer: MEDICARE

## 2025-08-05 ENCOUNTER — PATIENT OUTREACH (OUTPATIENT)
Dept: ADMINISTRATIVE | Facility: CLINIC | Age: 85
End: 2025-08-05
Payer: MEDICARE

## 2025-08-05 DIAGNOSIS — E87.1 HYPONATREMIA: Primary | ICD-10-CM

## 2025-08-05 NOTE — PROGRESS NOTES
C3 nurse spoke with Radha Morales for a TCC post hospital discharge follow up call. NP referral placed for [facility]

## 2025-08-11 ENCOUNTER — OFFICE VISIT (OUTPATIENT)
Dept: HOME HEALTH SERVICES | Facility: CLINIC | Age: 85
End: 2025-08-11
Payer: MEDICARE

## 2025-08-11 VITALS
TEMPERATURE: 97 F | RESPIRATION RATE: 16 BRPM | DIASTOLIC BLOOD PRESSURE: 53 MMHG | SYSTOLIC BLOOD PRESSURE: 129 MMHG | HEART RATE: 81 BPM | OXYGEN SATURATION: 96 %

## 2025-08-11 DIAGNOSIS — E87.6 HYPOKALEMIA: ICD-10-CM

## 2025-08-11 DIAGNOSIS — E87.1 HYPONATREMIA: ICD-10-CM

## 2025-08-11 DIAGNOSIS — E11.9 CONTROLLED TYPE 2 DIABETES MELLITUS WITHOUT COMPLICATION, WITHOUT LONG-TERM CURRENT USE OF INSULIN: Chronic | ICD-10-CM

## 2025-08-11 DIAGNOSIS — R53.81 DEBILITY: ICD-10-CM

## 2025-08-11 DIAGNOSIS — Z59.82 TRANSPORTATION INSECURITY: ICD-10-CM

## 2025-08-11 DIAGNOSIS — I10 ESSENTIAL HYPERTENSION: Primary | Chronic | ICD-10-CM

## 2025-08-11 DIAGNOSIS — Z71.89 ACP (ADVANCE CARE PLANNING): ICD-10-CM

## 2025-08-11 DIAGNOSIS — Z59.41 FOOD INSECURITY: ICD-10-CM

## 2025-08-11 PROCEDURE — 3074F SYST BP LT 130 MM HG: CPT | Mod: CPTII,S$GLB,, | Performed by: NURSE PRACTITIONER

## 2025-08-11 PROCEDURE — 3078F DIAST BP <80 MM HG: CPT | Mod: CPTII,S$GLB,, | Performed by: NURSE PRACTITIONER

## 2025-08-11 PROCEDURE — 1158F ADVNC CARE PLAN TLK DOCD: CPT | Mod: CPTII,S$GLB,, | Performed by: NURSE PRACTITIONER

## 2025-08-11 PROCEDURE — 99495 TRANSJ CARE MGMT MOD F2F 14D: CPT | Mod: S$GLB,,, | Performed by: NURSE PRACTITIONER

## 2025-08-11 SDOH — SOCIAL DETERMINANTS OF HEALTH (SDOH): TRANSPORTATION INSECURITY: Z59.82

## 2025-08-11 SDOH — SOCIAL DETERMINANTS OF HEALTH (SDOH): FOOD INSECURITY: Z59.41

## 2025-08-22 ENCOUNTER — PATIENT OUTREACH (OUTPATIENT)
Dept: ADMINISTRATIVE | Facility: OTHER | Age: 85
End: 2025-08-22
Payer: MEDICARE

## 2025-09-03 ENCOUNTER — PATIENT OUTREACH (OUTPATIENT)
Dept: ADMINISTRATIVE | Facility: OTHER | Age: 85
End: 2025-09-03
Payer: MEDICARE

## (undated) DEVICE — GLOVE BIOGEL ORTHOPEDIC 7.5

## (undated) DEVICE — SHEET DRAPE FAN-FOLDED 3/4

## (undated) DEVICE — PAD COLD THERAPY KNEE WRAP ON

## (undated) DEVICE — DRAPE INCISE IOBAN 2 23X33IN

## (undated) DEVICE — PAD PREP 50/CA

## (undated) DEVICE — SOL NACL 0.9% INJ PF/100 150

## (undated) DEVICE — SEE MEDLINE ITEM 154981

## (undated) DEVICE — GLOVE SURGICAL LATEX SZ 7

## (undated) DEVICE — DRESSING COVER AQUACEL AG SURG

## (undated) DEVICE — BLANKET UPPER BODY 78.7X29.9IN

## (undated) DEVICE — SOL IRR NACL .9% 3000ML

## (undated) DEVICE — GAUZE SPONGE 4X4 12PLY

## (undated) DEVICE — Device

## (undated) DEVICE — UNGERGLOVE BIOGEL PI INDIC SZ9

## (undated) DEVICE — GLOVE BIOGEL SZ 8 1/2

## (undated) DEVICE — SEE MEDLINE ITEM 146373

## (undated) DEVICE — SEE L#120831

## (undated) DEVICE — APPLICATOR CHLORAPREP ORN 26ML

## (undated) DEVICE — KIT CHECKPOINT MAKO

## (undated) DEVICE — ARMCRADLE BLUE POLY FOAM

## (undated) DEVICE — KIT VIZADISC KNEE TRACKING

## (undated) DEVICE — HOOD FLYTE PEELWY STERISHIELD

## (undated) DEVICE — SYR ONLY LUER LOCK 20CC

## (undated) DEVICE — COVER OVERHEAD SURG LT BLUE

## (undated) DEVICE — DRESSING AQUACEL AG 3.5X10IN

## (undated) DEVICE — SEE MEDLINE ITEM 152622

## (undated) DEVICE — ELECTRODE REM PLYHSV RETURN 9

## (undated) DEVICE — SEE MEDLINE ITEM 152487

## (undated) DEVICE — SOL .9NACL PF 100 ML

## (undated) DEVICE — SUT VICRYL PLUS 0 CT1 18IN

## (undated) DEVICE — UNDERGLOVES BIOGEL PI SZ 7 LF

## (undated) DEVICE — GLOVE BIOGEL PI MICRO SZ 7

## (undated) DEVICE — SEE MEDLINE ITEM 146313

## (undated) DEVICE — GLOVE SURGICAL LATEX SZ 8

## (undated) DEVICE — NDL 18GA X1 1/2 REG BEVEL

## (undated) DEVICE — SUT VICRYL+ 1 CT1 18IN

## (undated) DEVICE — SYR 50CC LL

## (undated) DEVICE — TOURNIQUET SB QC DP 34X4IN

## (undated) DEVICE — PULSAVAC ZIMMER

## (undated) DEVICE — SEE MEDLINE ITEM 157117

## (undated) DEVICE — SEE MEDLINE ITEM 157150

## (undated) DEVICE — SUT ETHILON 3/0 18IN PS-1

## (undated) DEVICE — TRAY FOLEY 16FR INFECTION CONT

## (undated) DEVICE — SUT STRATAFIX PDS PLS 45CM

## (undated) DEVICE — SUT VICRYL PLUS 2-0 CT1 18

## (undated) DEVICE — DRAPE STERI U-SHAPED 47X51IN

## (undated) DEVICE — NDL SAFETY 22G X 1.5 ECLIPSE

## (undated) DEVICE — SUPPORT ULNA NERVE PROTECTOR

## (undated) DEVICE — BLADE SAW OSC 19.5 X 1.2 X 90

## (undated) DEVICE — SPONGE GAUZE 16PLY 4X4

## (undated) DEVICE — KIT DRAPE RIO ONE PIECE W/POCK